# Patient Record
Sex: FEMALE | Race: BLACK OR AFRICAN AMERICAN | Employment: OTHER | URBAN - METROPOLITAN AREA
[De-identification: names, ages, dates, MRNs, and addresses within clinical notes are randomized per-mention and may not be internally consistent; named-entity substitution may affect disease eponyms.]

---

## 2017-01-11 RX ORDER — CLOPIDOGREL BISULFATE 75 MG/1
TABLET ORAL
Qty: 90 TABLET | Refills: 0 | Status: SHIPPED | OUTPATIENT
Start: 2017-01-11 | End: 2017-04-10

## 2017-01-17 ENCOUNTER — HOSPITAL ENCOUNTER (OUTPATIENT)
Dept: BONE DENSITY | Facility: HOSPITAL | Age: 67
Discharge: HOME | End: 2017-01-17
Attending: INTERNAL MEDICINE
Payer: MEDICARE

## 2017-01-17 DIAGNOSIS — Z91.81 AT HIGH RISK FOR FALLS: ICD-10-CM

## 2017-01-17 PROCEDURE — 77080 DXA BONE DENSITY AXIAL: CPT

## 2017-01-31 RX ORDER — LISINOPRIL AND HYDROCHLOROTHIAZIDE 25; 20 MG/1; MG/1
TABLET ORAL
Qty: 90 TABLET | Refills: 0 | Status: SHIPPED | OUTPATIENT
Start: 2017-01-31 | End: 2017-05-01

## 2017-03-15 RX ORDER — METOPROLOL SUCCINATE 25 MG/1
TABLET, EXTENDED RELEASE ORAL
Qty: 45 TABLET | Refills: 0 | Status: SHIPPED | OUTPATIENT
Start: 2017-03-15 | End: 2017-06-13

## 2017-04-11 RX ORDER — CLOPIDOGREL BISULFATE 75 MG/1
TABLET ORAL
Qty: 90 TABLET | Refills: 0 | Status: SHIPPED | OUTPATIENT
Start: 2017-04-11 | End: 2017-07-09

## 2017-05-01 RX ORDER — LISINOPRIL AND HYDROCHLOROTHIAZIDE 25; 20 MG/1; MG/1
TABLET ORAL
Qty: 90 TABLET | Refills: 0 | Status: SHIPPED | OUTPATIENT
Start: 2017-05-01 | End: 2018-10-22

## 2017-05-12 ENCOUNTER — OFFICE VISIT (OUTPATIENT)
Dept: INTERNAL MEDICINE CLINIC | Facility: CLINIC | Age: 67
End: 2017-05-12

## 2017-05-12 VITALS
HEIGHT: 63 IN | TEMPERATURE: 98 F | BODY MASS INDEX: 26.75 KG/M2 | OXYGEN SATURATION: 97 % | WEIGHT: 151 LBS | HEART RATE: 69 BPM | DIASTOLIC BLOOD PRESSURE: 82 MMHG | SYSTOLIC BLOOD PRESSURE: 130 MMHG

## 2017-05-12 DIAGNOSIS — I63.50 CEREBRAL ARTERY OCCLUSION WITH CEREBRAL INFARCTION (HCC): ICD-10-CM

## 2017-05-12 DIAGNOSIS — R94.31 ABNORMAL EKG: ICD-10-CM

## 2017-05-12 DIAGNOSIS — I69.398 ABNORMALITY OF GAIT AS LATE EFFECT OF CEREBROVASCULAR ACCIDENT (CVA): ICD-10-CM

## 2017-05-12 DIAGNOSIS — R26.9 ABNORMALITY OF GAIT AS LATE EFFECT OF CEREBROVASCULAR ACCIDENT (CVA): ICD-10-CM

## 2017-05-12 DIAGNOSIS — Z86.73 HISTORY OF CVA (CEREBROVASCULAR ACCIDENT): ICD-10-CM

## 2017-05-12 DIAGNOSIS — E11.9 DIABETES MELLITUS TYPE 2 WITHOUT RETINOPATHY (HCC): Primary | ICD-10-CM

## 2017-05-12 DIAGNOSIS — I10 ESSENTIAL HYPERTENSION: ICD-10-CM

## 2017-05-12 DIAGNOSIS — E78.5 HYPERLIPIDEMIA, UNSPECIFIED HYPERLIPIDEMIA TYPE: ICD-10-CM

## 2017-05-12 PROCEDURE — 93010 ELECTROCARDIOGRAM REPORT: CPT | Performed by: INTERNAL MEDICINE

## 2017-05-12 PROCEDURE — G0463 HOSPITAL OUTPT CLINIC VISIT: HCPCS | Performed by: INTERNAL MEDICINE

## 2017-05-12 PROCEDURE — 36415 COLL VENOUS BLD VENIPUNCTURE: CPT | Performed by: INTERNAL MEDICINE

## 2017-05-12 PROCEDURE — 99214 OFFICE O/P EST MOD 30 MIN: CPT | Performed by: INTERNAL MEDICINE

## 2017-05-12 PROCEDURE — 93005 ELECTROCARDIOGRAM TRACING: CPT | Performed by: INTERNAL MEDICINE

## 2017-05-12 NOTE — PROGRESS NOTES
HPI:    Patient ID: Caridad Fuentes is a 77year old female. HPI   Patient comes in for 6 months follow-up. Denies any complaints doing well overall taking her medications we did labs last time A1c was 6.8 all other labs were pretty good.   Up-to-date w Diabetes ([facility] [address]    • Unspecified essential hypertension    • Type II or unspecified type diabetes mellitus without mention of complication, not stated as uncontrolled    • Stroke [facility]) 1/2014   • Other and unspecified hyperlipidemia    • Numbness and tingli Hyperlipidemia, unspecified hyperlipidemia type- continue with current care   Abnormality of gait as late effect of cerebrovascular accident (cva)- stable, no falls  Abnormal EKG- no cp, no sob, will get an echo t see function       Orders Placed This En

## 2017-05-12 NOTE — PATIENT INSTRUCTIONS
ASSESSMENT/PLAN:   Diabetes mellitus type 2 without retinopathy (hcc)  (primary encounter diagnosis)- will continue with current care , will recheck a1c  Cerebral artery occlusion with cerebral infarction (hcc)- stable, continue with medical treatment   Es

## 2017-05-20 RX ORDER — ATORVASTATIN CALCIUM 20 MG/1
TABLET, FILM COATED ORAL
Qty: 90 TABLET | Refills: 0 | Status: SHIPPED | OUTPATIENT
Start: 2017-05-20 | End: 2017-08-22

## 2017-06-05 ENCOUNTER — HOSPITAL ENCOUNTER (OUTPATIENT)
Dept: CV DIAGNOSTICS | Facility: HOSPITAL | Age: 67
Discharge: HOME | End: 2017-06-05
Attending: INTERNAL MEDICINE
Payer: MEDICARE

## 2017-06-05 DIAGNOSIS — I10 ESSENTIAL HYPERTENSION: ICD-10-CM

## 2017-06-05 DIAGNOSIS — I63.50 CEREBRAL ARTERY OCCLUSION WITH CEREBRAL INFARCTION (HCC): ICD-10-CM

## 2017-06-05 DIAGNOSIS — E11.9 DIABETES MELLITUS TYPE 2 WITHOUT RETINOPATHY (HCC): ICD-10-CM

## 2017-06-05 DIAGNOSIS — R94.31 ABNORMAL EKG: ICD-10-CM

## 2017-06-05 PROCEDURE — 93306 TTE W/DOPPLER COMPLETE: CPT | Performed by: INTERNAL MEDICINE

## 2017-06-06 ENCOUNTER — TELEPHONE (OUTPATIENT)
Dept: INTERNAL MEDICINE CLINIC | Facility: CLINIC | Age: 67
End: 2017-06-06

## 2017-06-14 RX ORDER — METOPROLOL SUCCINATE 25 MG/1
TABLET, EXTENDED RELEASE ORAL
Qty: 45 TABLET | Refills: 1 | Status: SHIPPED | OUTPATIENT
Start: 2017-06-14 | End: 2017-12-07

## 2017-07-10 RX ORDER — CLOPIDOGREL BISULFATE 75 MG/1
TABLET ORAL
Qty: 90 TABLET | Refills: 0 | Status: SHIPPED | OUTPATIENT
Start: 2017-07-10 | End: 2017-10-05

## 2017-08-03 ENCOUNTER — TELEPHONE (OUTPATIENT)
Dept: INTERNAL MEDICINE CLINIC | Facility: CLINIC | Age: 67
End: 2017-08-03

## 2017-08-03 DIAGNOSIS — E11.8 TYPE 2 DIABETES MELLITUS WITH COMPLICATION, WITHOUT LONG-TERM CURRENT USE OF INSULIN (HCC): Primary | ICD-10-CM

## 2017-08-03 DIAGNOSIS — Z12.31 ENCOUNTER FOR SCREENING MAMMOGRAM FOR BREAST CANCER: Primary | ICD-10-CM

## 2017-08-03 NOTE — TELEPHONE ENCOUNTER
Patient requesting referral for Dr Dony Villegas for yearly eye exam.    Patient also requesting referral for Podiatrist for yearly DM.      Please call when referrals are authorized

## 2017-08-11 ENCOUNTER — OFFICE VISIT (OUTPATIENT)
Dept: INTERNAL MEDICINE CLINIC | Facility: CLINIC | Age: 67
End: 2017-08-11

## 2017-08-11 VITALS
DIASTOLIC BLOOD PRESSURE: 67 MMHG | SYSTOLIC BLOOD PRESSURE: 117 MMHG | HEIGHT: 63 IN | TEMPERATURE: 99 F | WEIGHT: 153 LBS | BODY MASS INDEX: 27.11 KG/M2 | HEART RATE: 77 BPM

## 2017-08-11 DIAGNOSIS — M54.6 ACUTE RIGHT-SIDED THORACIC BACK PAIN: ICD-10-CM

## 2017-08-11 DIAGNOSIS — R10.9 ABDOMINAL DISCOMFORT: ICD-10-CM

## 2017-08-11 DIAGNOSIS — R35.0 FREQUENCY OF URINATION: Primary | ICD-10-CM

## 2017-08-11 LAB
APPEARANCE: CLEAR
MULTISTIX LOT#: NORMAL NUMERIC
PH, URINE: 5.5 (ref 4.5–8)
SPECIFIC GRAVITY: 1.02 (ref 1–1.03)
URINE-COLOR: YELLOW
UROBILINOGEN,SEMI-QN: 1 MG/DL (ref 0–1.9)

## 2017-08-11 PROCEDURE — 99214 OFFICE O/P EST MOD 30 MIN: CPT | Performed by: INTERNAL MEDICINE

## 2017-08-11 PROCEDURE — G0463 HOSPITAL OUTPT CLINIC VISIT: HCPCS | Performed by: INTERNAL MEDICINE

## 2017-08-11 PROCEDURE — 81003 URINALYSIS AUTO W/O SCOPE: CPT | Performed by: INTERNAL MEDICINE

## 2017-08-18 ENCOUNTER — OUTPATIENT (OUTPATIENT)
Dept: OUTPATIENT SERVICES | Facility: HOSPITAL | Age: 67
LOS: 1 days | End: 2017-08-18
Payer: MEDICARE

## 2017-08-18 PROCEDURE — 76536 US EXAM OF HEAD AND NECK: CPT | Mod: 26

## 2017-08-22 RX ORDER — ATORVASTATIN CALCIUM 20 MG/1
TABLET, FILM COATED ORAL
Qty: 90 TABLET | Refills: 0 | Status: SHIPPED | OUTPATIENT
Start: 2017-08-22 | End: 2017-11-19

## 2017-09-12 ENCOUNTER — OFFICE VISIT (OUTPATIENT)
Dept: PODIATRY CLINIC | Facility: CLINIC | Age: 67
End: 2017-09-12

## 2017-09-12 DIAGNOSIS — E11.42 TYPE 2 DIABETES MELLITUS WITH DIABETIC POLYNEUROPATHY, WITHOUT LONG-TERM CURRENT USE OF INSULIN (HCC): Primary | ICD-10-CM

## 2017-09-12 PROCEDURE — G0463 HOSPITAL OUTPT CLINIC VISIT: HCPCS | Performed by: PODIATRIST

## 2017-09-12 PROCEDURE — 99203 OFFICE O/P NEW LOW 30 MIN: CPT | Performed by: PODIATRIST

## 2017-09-12 NOTE — PROGRESS NOTES
HPI:    Patient ID: Jovanna Clarke is a 79year old female. HPI  This pleasant 80-year-old female presents as a new patient to me on referral from [address] [place] [address].   Patient states that she is here for a diabetic foot evaluation and concerned about a sense of reviewed the etiology, progression, and the for treatment options for fungus toenails. My impression today is debridement is her best and only option. I discussed the importance of daily inspection, proper hygiene, and the daily use of a lotion.   I also

## 2017-09-16 ENCOUNTER — EMERGENCY (EMERGENCY)
Facility: HOSPITAL | Age: 67
LOS: 1 days | End: 2017-09-16
Payer: MEDICARE

## 2017-09-16 ENCOUNTER — HOSPITAL ENCOUNTER (OUTPATIENT)
Dept: MAMMOGRAPHY | Facility: HOSPITAL | Age: 67
Discharge: HOME | End: 2017-09-16
Attending: INTERNAL MEDICINE
Payer: MEDICARE

## 2017-09-16 DIAGNOSIS — Z12.31 ENCOUNTER FOR SCREENING MAMMOGRAM FOR BREAST CANCER: ICD-10-CM

## 2017-09-16 PROCEDURE — 99283 EMERGENCY DEPT VISIT LOW MDM: CPT

## 2017-09-16 PROCEDURE — 77067 SCR MAMMO BI INCL CAD: CPT | Performed by: INTERNAL MEDICINE

## 2017-09-16 PROCEDURE — 73630 X-RAY EXAM OF FOOT: CPT | Mod: 26,LT

## 2017-09-25 ENCOUNTER — TELEPHONE (OUTPATIENT)
Dept: INTERNAL MEDICINE CLINIC | Facility: CLINIC | Age: 67
End: 2017-09-25

## 2017-09-25 NOTE — TELEPHONE ENCOUNTER
Patient needs an order faxed to pharmacy for diabetic  Supplies she needs strips for one touch ultra 2 phone # [phone] or fax to  [phone]

## 2017-09-25 NOTE — TELEPHONE ENCOUNTER
bety already informed, one touch ultra is no longer covered by her insurance, I called in rx for eithee accu check or the true test which is covered by her insurance. Pt was informed of this.

## 2017-10-05 RX ORDER — CLOPIDOGREL BISULFATE 75 MG/1
TABLET ORAL
Qty: 90 TABLET | Refills: 0 | Status: SHIPPED | OUTPATIENT
Start: 2017-10-05 | End: 2018-01-03

## 2017-10-05 NOTE — TELEPHONE ENCOUNTER
No refill protocol for clopidogrel.   YANDEL please advise on refill request.    Refill Protocol Appointment Criteria  · Appointment scheduled in the past 6 months or in the next 3 months  Recent Outpatient Visits            3 weeks ago Type 2 diabetes mellitu

## 2017-10-10 ENCOUNTER — TELEPHONE (OUTPATIENT)
Dept: INTERNAL MEDICINE CLINIC | Facility: CLINIC | Age: 67
End: 2017-10-10

## 2017-10-10 NOTE — TELEPHONE ENCOUNTER
Contacted patient to schedule her AHA (medicare annual exam) and patient stated that she has a \"Follow up\" appt. With you on 11/17 and requested if she can change that appt to her Medicare annual exam. Please advise. Thank you.

## 2017-10-10 NOTE — TELEPHONE ENCOUNTER
Yours noted and yes, her last AHA (Medicare annual exam) was 11/11/16. I will contact patient. Thank you.

## 2017-10-19 NOTE — TELEPHONE ENCOUNTER
Rx request for Metformin  mg, FAILED Diabetes Protocol. UNABLE to fill per protocol. Please review. Thank you.     Diabetes Medications  Protocol Criteria:  · Appointment scheduled in the past 6 months or the next 3 months  · A1C < 7.5 in the past 6

## 2017-11-08 ENCOUNTER — TELEPHONE (OUTPATIENT)
Dept: INTERNAL MEDICINE CLINIC | Facility: CLINIC | Age: 67
End: 2017-11-08

## 2017-11-08 DIAGNOSIS — E11.9 TYPE 2 DIABETES MELLITUS WITHOUT COMPLICATION, WITHOUT LONG-TERM CURRENT USE OF INSULIN (HCC): Primary | ICD-10-CM

## 2017-11-13 ENCOUNTER — OFFICE VISIT (OUTPATIENT)
Dept: PODIATRY CLINIC | Facility: CLINIC | Age: 67
End: 2017-11-13

## 2017-11-13 DIAGNOSIS — E11.42 TYPE 2 DIABETES MELLITUS WITH DIABETIC POLYNEUROPATHY, WITHOUT LONG-TERM CURRENT USE OF INSULIN (HCC): Primary | ICD-10-CM

## 2017-11-13 PROCEDURE — 99212 OFFICE O/P EST SF 10 MIN: CPT | Performed by: PODIATRIST

## 2017-11-13 PROCEDURE — G0463 HOSPITAL OUTPT CLINIC VISIT: HCPCS | Performed by: PODIATRIST

## 2017-11-13 RX ORDER — BLOOD-GLUCOSE METER
EACH MISCELLANEOUS
Refills: 3 | COMMUNITY
Start: 2017-09-25 | End: 2019-04-09

## 2017-11-13 NOTE — PROGRESS NOTES
HPI:    Patient ID: Yasmin Calderon is a 79year old female. HPI  This 49-year-old diabetic presents for evaluation in reference to the treatment associated with sensory neuropathy.   She states that her fasting blood sugar today was 131 and her most rec open or draining. Based on the room rather dramatic change I encouraged continued control of her blood sugar level, maintain the vitamin B6 at 100 mg per day. Continue daily inspection, proper hygiene, and the daily use of a lotion.   Reevaluate in 6 jose daniel

## 2017-11-17 ENCOUNTER — OFFICE VISIT (OUTPATIENT)
Dept: INTERNAL MEDICINE CLINIC | Facility: CLINIC | Age: 67
End: 2017-11-17

## 2017-11-17 VITALS
WEIGHT: 147 LBS | BODY MASS INDEX: 26.05 KG/M2 | DIASTOLIC BLOOD PRESSURE: 80 MMHG | HEART RATE: 77 BPM | HEIGHT: 63 IN | SYSTOLIC BLOOD PRESSURE: 135 MMHG

## 2017-11-17 DIAGNOSIS — E11.9 TYPE 2 DIABETES MELLITUS WITHOUT COMPLICATION, WITHOUT LONG-TERM CURRENT USE OF INSULIN (HCC): ICD-10-CM

## 2017-11-17 DIAGNOSIS — I10 ESSENTIAL HYPERTENSION: ICD-10-CM

## 2017-11-17 DIAGNOSIS — M25.561 CHRONIC PAIN OF RIGHT KNEE: ICD-10-CM

## 2017-11-17 DIAGNOSIS — R47.1 DYSARTHRIA: ICD-10-CM

## 2017-11-17 DIAGNOSIS — R26.9 ABNORMALITY OF GAIT AS LATE EFFECT OF CEREBROVASCULAR ACCIDENT (CVA): ICD-10-CM

## 2017-11-17 DIAGNOSIS — Z86.79 HISTORY OF CEREBRAL ARTERY OCCLUSION: ICD-10-CM

## 2017-11-17 DIAGNOSIS — E78.5 HYPERLIPIDEMIA, UNSPECIFIED HYPERLIPIDEMIA TYPE: ICD-10-CM

## 2017-11-17 DIAGNOSIS — Z00.00 MEDICARE ANNUAL WELLNESS VISIT, SUBSEQUENT: Primary | ICD-10-CM

## 2017-11-17 DIAGNOSIS — H25.13 AGE-RELATED NUCLEAR CATARACT OF BOTH EYES: ICD-10-CM

## 2017-11-17 DIAGNOSIS — G89.29 CHRONIC PAIN OF RIGHT KNEE: ICD-10-CM

## 2017-11-17 DIAGNOSIS — I69.398 ABNORMALITY OF GAIT AS LATE EFFECT OF CEREBROVASCULAR ACCIDENT (CVA): ICD-10-CM

## 2017-11-17 PROCEDURE — 36415 COLL VENOUS BLD VENIPUNCTURE: CPT | Performed by: INTERNAL MEDICINE

## 2017-11-17 PROCEDURE — 96160 PT-FOCUSED HLTH RISK ASSMT: CPT | Performed by: INTERNAL MEDICINE

## 2017-11-17 PROCEDURE — G0009 ADMIN PNEUMOCOCCAL VACCINE: HCPCS | Performed by: INTERNAL MEDICINE

## 2017-11-17 PROCEDURE — 90653 IIV ADJUVANT VACCINE IM: CPT | Performed by: INTERNAL MEDICINE

## 2017-11-17 PROCEDURE — G0008 ADMIN INFLUENZA VIRUS VAC: HCPCS | Performed by: INTERNAL MEDICINE

## 2017-11-17 PROCEDURE — 90732 PPSV23 VACC 2 YRS+ SUBQ/IM: CPT | Performed by: INTERNAL MEDICINE

## 2017-11-17 PROCEDURE — G0439 PPPS, SUBSEQ VISIT: HCPCS | Performed by: INTERNAL MEDICINE

## 2017-11-17 NOTE — PROGRESS NOTES
HPI:   Anna Marie Belle is a 79year old female who presents for a Medicare Subsequent Annual Wellness visit (Pt already had Initial Annual Wellness).       Her last annual assessment has been over 1 year: Annual Physical due on 11/11/2017       Pt here for [DISCONTINUED] ATORVASTATIN 20 MG Oral Tab TAKE 1 TABLET BY MOUTH DAILY   METOPROLOL SUCCINATE ER 25 MG Oral Tablet 24 Hr TAKE 1/2 TABLET BY MOUTH DAILY   LISINOPRIL-HYDROCHLOROTHIAZIDE 20-25 MG Oral Tab TAKE 1 TABLET BY MOUTH EVERY DAY   Ana Lilia Adkins Height as of this encounter: 5' 3\" (1.6 m). Weight as of this encounter: 147 lb (66.7 kg).     Medicare Hearing Assessment  (Required for AWV/SWV)    Hearing Screening    Screening Method:  Questionnaire  I have a problem hearing over the telephone:  No of motion. Neck supple. Cardiovascular: Normal rate, regular rhythm, normal heart sounds and intact distal pulses. Pulmonary/Chest: Effort normal and breath sounds normal.   Abdominal: Soft.  Bowel sounds are normal.   Musculoskeletal: Normal range of hyperlipidemia type- will retest     Abnormality of gait as late effect of cerebrovascular accident (CVA)- stable,pt refuses to use a cane or walker    Ms. Iveth Puga already takes aspirin and has it on her medication list.     Diet assessment: good     Advanced activities? : No    Memory Problems?: No      Fall/Risk Assessment     Do you have 3 or more medical conditions?: 1-Yes    Have you fallen in the last 12 months?: 1-Yes (pt tripped)    Do you accidently lose urine?: 0-No    Do you have difficulty seeing?: 03/18/2016 47        EKG - w/ Initial Preventative Physical Exam only, or if medically necessary Electrocardiogram date05/12/2017     Colorectal Cancer Screening      Colonoscopy Screen every 10 years Colonoscopy,10 Years due on 06/05/2000 Update Health Internal Lab or Procedure External Lab or Procedure   Annual Monitoring of Persistent     Medications (ACE/ARB, digoxin diuretics, anticonvulsants.)    Potassium  Annually Potassium (mmol/L)   Date Value   11/17/2017 3.4     POTASSIUM (P) (mmol/L)   Date V

## 2017-11-20 RX ORDER — ATORVASTATIN CALCIUM 20 MG/1
TABLET, FILM COATED ORAL
Qty: 90 TABLET | Refills: 0 | Status: SHIPPED | OUTPATIENT
Start: 2017-11-20 | End: 2018-02-15

## 2017-11-23 PROBLEM — Z86.79 HISTORY OF CEREBRAL ARTERY OCCLUSION: Status: ACTIVE | Noted: 2017-11-23

## 2017-12-07 RX ORDER — METOPROLOL SUCCINATE 25 MG/1
TABLET, EXTENDED RELEASE ORAL
Qty: 45 TABLET | Refills: 0 | Status: SHIPPED | OUTPATIENT
Start: 2017-12-07 | End: 2018-03-08

## 2017-12-19 ENCOUNTER — OFFICE VISIT (OUTPATIENT)
Dept: OPHTHALMOLOGY | Facility: CLINIC | Age: 67
End: 2017-12-19

## 2017-12-19 DIAGNOSIS — E11.9 DIABETES MELLITUS TYPE 2 WITHOUT RETINOPATHY (HCC): Primary | ICD-10-CM

## 2017-12-19 DIAGNOSIS — H25.13 AGE-RELATED NUCLEAR CATARACT OF BOTH EYES: ICD-10-CM

## 2017-12-19 PROCEDURE — 92014 COMPRE OPH EXAM EST PT 1/>: CPT | Performed by: OPHTHALMOLOGY

## 2017-12-19 NOTE — PATIENT INSTRUCTIONS
Diabetes mellitus type 2 without retinopathy ([facility] [address]  Diabetes type II: no background of retinopathy, no signs of neovascularization noted. Discussed ocular and systemic benefits of blood sugar control.   Diagnosis and treatment discussed in detail with félix

## 2017-12-19 NOTE — PROGRESS NOTES
Milly Berkowitz is a 79year old female.     HPI:     HPI     Consult    Additional comments: Per Dr. Trini Woodruff           Diabetic Eye Exam    Additional comments: Pt has been a diabetic for 3 years  3 years on pills/  0 years on Insulin   Pt checks her BS 2x a Oral Tab TAKE 1 TABLET BY MOUTH TWICE DAILY WITH BREAKFAST AND DINNER Disp: 180 tablet Rfl: 0   CLOPIDOGREL BISULFATE 75 MG Oral Tab TAKE 1 TABLET BY MOUTH DAILY Disp: 90 tablet Rfl: 0   ONETOUCH ULTRA BLUE In Vitro Strip TEST TWICE DAILY AS DIRECTED Disp: Cortical cataract 2-3+ Nuclear sclerosis    Vitreous Clear Vitreous floaters          Fundus Exam       Right Left    Disc Good rim Good rim, Temporal crescent    C/D Ratio 0.5 0.5    Macula Normal- no BDR Normal- no BDR    Vessels Normal Normal    Periphe

## 2018-01-03 RX ORDER — CLOPIDOGREL BISULFATE 75 MG/1
TABLET ORAL
Qty: 90 TABLET | Refills: 0 | Status: SHIPPED | OUTPATIENT
Start: 2018-01-03 | End: 2018-04-02

## 2018-02-15 RX ORDER — ATORVASTATIN CALCIUM 20 MG/1
TABLET, FILM COATED ORAL
Qty: 90 TABLET | Refills: 0 | Status: SHIPPED | OUTPATIENT
Start: 2018-02-15 | End: 2018-05-16

## 2018-03-08 RX ORDER — METOPROLOL SUCCINATE 25 MG/1
TABLET, EXTENDED RELEASE ORAL
Qty: 45 TABLET | Refills: 0 | Status: SHIPPED | OUTPATIENT
Start: 2018-03-08 | End: 2018-06-08

## 2018-03-27 ENCOUNTER — OUTPATIENT (OUTPATIENT)
Dept: OUTPATIENT SERVICES | Facility: HOSPITAL | Age: 68
LOS: 1 days | End: 2018-03-27

## 2018-04-02 RX ORDER — CLOPIDOGREL BISULFATE 75 MG/1
TABLET ORAL
Qty: 90 TABLET | Refills: 0 | Status: SHIPPED | OUTPATIENT
Start: 2018-04-02 | End: 2018-07-03

## 2018-04-20 ENCOUNTER — OFFICE VISIT (OUTPATIENT)
Dept: INTERNAL MEDICINE CLINIC | Facility: CLINIC | Age: 68
End: 2018-04-20

## 2018-04-20 VITALS
HEIGHT: 63 IN | BODY MASS INDEX: 27.46 KG/M2 | SYSTOLIC BLOOD PRESSURE: 137 MMHG | DIASTOLIC BLOOD PRESSURE: 88 MMHG | HEART RATE: 73 BPM | WEIGHT: 155 LBS

## 2018-04-20 DIAGNOSIS — G89.29 CHRONIC PAIN OF RIGHT KNEE: ICD-10-CM

## 2018-04-20 DIAGNOSIS — E11.42 TYPE 2 DIABETES MELLITUS WITH DIABETIC POLYNEUROPATHY, WITHOUT LONG-TERM CURRENT USE OF INSULIN (HCC): ICD-10-CM

## 2018-04-20 DIAGNOSIS — I65.23 CAROTID ARTERY PLAQUE, BILATERAL: ICD-10-CM

## 2018-04-20 DIAGNOSIS — I10 ESSENTIAL HYPERTENSION: ICD-10-CM

## 2018-04-20 DIAGNOSIS — I63.50 CEREBRAL ARTERY OCCLUSION WITH CEREBRAL INFARCTION (HCC): ICD-10-CM

## 2018-04-20 DIAGNOSIS — I50.32 CHRONIC DIASTOLIC CONGESTIVE HEART FAILURE, NYHA CLASS 1 (HCC): ICD-10-CM

## 2018-04-20 DIAGNOSIS — R47.1 DYSARTHRIA: ICD-10-CM

## 2018-04-20 DIAGNOSIS — M25.561 CHRONIC PAIN OF RIGHT KNEE: ICD-10-CM

## 2018-04-20 DIAGNOSIS — H25.13 AGE-RELATED NUCLEAR CATARACT OF BOTH EYES: ICD-10-CM

## 2018-04-20 DIAGNOSIS — I69.398 ABNORMALITY OF GAIT AS LATE EFFECT OF CEREBROVASCULAR ACCIDENT (CVA): ICD-10-CM

## 2018-04-20 DIAGNOSIS — Z00.00 MEDICARE ANNUAL WELLNESS VISIT, SUBSEQUENT: ICD-10-CM

## 2018-04-20 DIAGNOSIS — E78.5 HYPERLIPIDEMIA, UNSPECIFIED HYPERLIPIDEMIA TYPE: ICD-10-CM

## 2018-04-20 DIAGNOSIS — R26.9 ABNORMALITY OF GAIT AS LATE EFFECT OF CEREBROVASCULAR ACCIDENT (CVA): ICD-10-CM

## 2018-04-20 PROCEDURE — G0439 PPPS, SUBSEQ VISIT: HCPCS | Performed by: INTERNAL MEDICINE

## 2018-04-20 PROCEDURE — 96160 PT-FOCUSED HLTH RISK ASSMT: CPT | Performed by: INTERNAL MEDICINE

## 2018-04-20 NOTE — PROGRESS NOTES
HPI:   Dario Leung is a 79year old female who presents for a MA (Medicare Advantage) [facility] (Once per calendar year). Annual Physical due on 11/17/2018    pt comes in for Medicare annual visit.   Overall she is doing okay same right-sided weak Maryellen Sykes was screened for Alcohol abuse and had a score of 0 so is at low risk.     Patient Care Team: Patient Care Team:  Padmini Canela MD as PCP - General (Internal Medicine)    Patient Active Problem List:     Cerebral artery occlusion with ce CALCIUM OR Take by mouth. Cholecalciferol (VITAMIN D-3 OR) Take by mouth.    LISINOPRIL-HYDROCHLOROTHIAZIDE 20-25 MG Oral Tab TAKE 1 TABLET BY MOUTH EVERY DAY   ONETOUCH CLINT COOPER 08X Does not apply Misc use as directed twice daily   ONETOUCH ULTRA (70.3 kg).     Medicare Hearing Assessment  (Required for AWV/SWV)    Hearing Screening    Screening Method:  Questionnaire  I have a problem hearing over the telephone:  No I have trouble following the conversations when two or more people are talking at t distal pulses. Pulmonary/Chest: Effort normal and breath sounds normal.   Abdominal: Soft. Bowel sounds are normal.   Musculoskeletal: Normal range of motion. Neurological: She is alert and oriented to person, place, and time.  A cranial nerve deficit TAKE 1 TABLET BY MOUTH TWICE DAILY WITH BREAKFAST AND DINNER         Diet assessment: good     PLAN:  The patient indicates understanding of these issues and agrees to the plan. Reinforced healthy diet, lifestyle, and exercise. No Follow-up on file. 12/19/2017   Last Dilated Eye Exam 12/19/2017       Bone Density Screening      Dexascan Every two years Last Dexa Scan:   XR DEXA BONE DENSITOMETRY (CPT=77080) 01/17/2017    No flowsheet data found.     Pap and Pelvic      Pap: Every 3 yrs age 21-65 or Pap 3. 4     POTASSIUM (P) (mmol/L)   Date Value   03/13/2015 3.8    No flowsheet data found. Creatinine  Annually Creatinine (mg/dL)   Date Value   11/17/2017 0.96     CREATININE (P) (mg/dL)   Date Value   03/13/2015 0.75    No flowsheet data found.     Drug

## 2018-05-15 ENCOUNTER — OFFICE VISIT (OUTPATIENT)
Dept: PODIATRY CLINIC | Facility: CLINIC | Age: 68
End: 2018-05-15

## 2018-05-15 DIAGNOSIS — B35.1 ONYCHOMYCOSIS: ICD-10-CM

## 2018-05-15 DIAGNOSIS — E11.42 TYPE 2 DIABETES MELLITUS WITH DIABETIC POLYNEUROPATHY, WITHOUT LONG-TERM CURRENT USE OF INSULIN (HCC): Primary | ICD-10-CM

## 2018-05-15 PROCEDURE — 11721 DEBRIDE NAIL 6 OR MORE: CPT | Performed by: PODIATRIST

## 2018-05-15 NOTE — PROGRESS NOTES
HPI:    Patient ID: Luz Elena Luis is a 79year old female. HPI  This 70-year-old diabetic presents for evaluation and care. She saw Sheila Jo on April 20, 2018. Her most recent A1c was 6.6 and her fasting blood sugar today was 117.   Review of Systems toenails was accomplished today without incident. I reduced the nails, subungual debris, and some keratosis. No ulcerations or infections were encountered upon debridement.   I cautioned her in reference to daily inspection, proper hygiene, and a consiste

## 2018-05-16 RX ORDER — ATORVASTATIN CALCIUM 20 MG/1
TABLET, FILM COATED ORAL
Qty: 90 TABLET | Refills: 0 | Status: SHIPPED | OUTPATIENT
Start: 2018-05-16 | End: 2018-08-15

## 2018-06-08 RX ORDER — METOPROLOL SUCCINATE 25 MG/1
TABLET, EXTENDED RELEASE ORAL
Qty: 45 TABLET | Refills: 1 | Status: SHIPPED | OUTPATIENT
Start: 2018-06-08 | End: 2019-04-08

## 2018-07-05 RX ORDER — CLOPIDOGREL BISULFATE 75 MG/1
TABLET ORAL
Qty: 90 TABLET | Refills: 0 | Status: SHIPPED | OUTPATIENT
Start: 2018-07-05 | End: 2018-10-03

## 2018-08-07 ENCOUNTER — OFFICE VISIT (OUTPATIENT)
Dept: INTERNAL MEDICINE CLINIC | Facility: CLINIC | Age: 68
End: 2018-08-07
Payer: MEDICARE

## 2018-08-07 VITALS
BODY MASS INDEX: 27.11 KG/M2 | HEIGHT: 63 IN | WEIGHT: 153 LBS | SYSTOLIC BLOOD PRESSURE: 131 MMHG | HEART RATE: 69 BPM | DIASTOLIC BLOOD PRESSURE: 87 MMHG

## 2018-08-07 DIAGNOSIS — I10 ESSENTIAL HYPERTENSION: ICD-10-CM

## 2018-08-07 DIAGNOSIS — E78.5 HYPERLIPIDEMIA, UNSPECIFIED HYPERLIPIDEMIA TYPE: ICD-10-CM

## 2018-08-07 DIAGNOSIS — I69.398 ABNORMALITY OF GAIT AS LATE EFFECT OF CEREBROVASCULAR ACCIDENT (CVA): ICD-10-CM

## 2018-08-07 DIAGNOSIS — R26.9 ABNORMALITY OF GAIT AS LATE EFFECT OF CEREBROVASCULAR ACCIDENT (CVA): ICD-10-CM

## 2018-08-07 DIAGNOSIS — E11.42 TYPE 2 DIABETES MELLITUS WITH DIABETIC POLYNEUROPATHY, WITHOUT LONG-TERM CURRENT USE OF INSULIN (HCC): Primary | ICD-10-CM

## 2018-08-07 DIAGNOSIS — I50.32 CHRONIC DIASTOLIC CONGESTIVE HEART FAILURE, NYHA CLASS 1 (HCC): ICD-10-CM

## 2018-08-07 DIAGNOSIS — R47.1 DYSARTHRIA: ICD-10-CM

## 2018-08-07 LAB
CHOLEST SERPL-MCNC: 109 MG/DL (ref 110–200)
HDLC SERPL-MCNC: 59 MG/DL
LDLC SERPL CALC-MCNC: 38 MG/DL (ref 0–99)
NONHDLC SERPL-MCNC: 50 MG/DL
TRIGL SERPL-MCNC: 59 MG/DL (ref 1–149)

## 2018-08-07 PROCEDURE — 99214 OFFICE O/P EST MOD 30 MIN: CPT | Performed by: INTERNAL MEDICINE

## 2018-08-07 PROCEDURE — 36415 COLL VENOUS BLD VENIPUNCTURE: CPT | Performed by: INTERNAL MEDICINE

## 2018-08-07 NOTE — PATIENT INSTRUCTIONS
ASSESSMENT/PLAN:   Type 2 diabetes mellitus with diabetic polyneuropathy, without long-term current use of insulin (hcc)  (primary encounter diagnosis) we will retest continue with current treatment watch diet  Essential hypertension treated with current t

## 2018-08-07 NOTE — PROGRESS NOTES
HPI:    Patient ID: Armida Ledezma is a 76year old female. HPI  Patient comes in for six-month follow-up doing well denies any complaints taking medication watches diet. Review of Systems   Constitutional: Negative. HENT: Negative.     Eyes: Jacqueline Chano unspecified hyperlipidemia    • Stroke [facility]) 1/2014   • Type II or unspecified type diabetes mellitus without mention of complication, not stated as uncontrolled    • Unspecified essential hypertension       Past Surgical History:  No date: CYST ASPIRATION accident (cva) due to prior CVA watch step no falls  Dysarthria stable      Orders Placed This Encounter      Lipid Panel [E], specimen      Hemoglobin A1C [E]    Meds This Visit:  No prescriptions requested or ordered in this encounter    Imaging & Referr

## 2018-08-08 LAB — HBA1C MFR BLD: 6.7 % (ref 4–6)

## 2018-08-15 RX ORDER — ATORVASTATIN CALCIUM 20 MG/1
TABLET, FILM COATED ORAL
Qty: 90 TABLET | Refills: 0 | Status: SHIPPED | OUTPATIENT
Start: 2018-08-15 | End: 2018-11-13

## 2018-09-17 ENCOUNTER — HOSPITAL ENCOUNTER (OUTPATIENT)
Dept: MAMMOGRAPHY | Facility: HOSPITAL | Age: 68
Discharge: HOME | End: 2018-09-17
Attending: INTERNAL MEDICINE
Payer: MEDICARE

## 2018-09-17 ENCOUNTER — TELEPHONE (OUTPATIENT)
Dept: INTERNAL MEDICINE CLINIC | Facility: CLINIC | Age: 68
End: 2018-09-17

## 2018-09-17 DIAGNOSIS — Z92.89 HISTORY OF SCREENING MAMMOGRAPHY: Primary | ICD-10-CM

## 2018-09-17 DIAGNOSIS — Z12.31 SCREENING MAMMOGRAM, ENCOUNTER FOR: ICD-10-CM

## 2018-09-17 PROCEDURE — 77067 SCR MAMMO BI INCL CAD: CPT | Performed by: INTERNAL MEDICINE

## 2018-10-03 RX ORDER — CLOPIDOGREL BISULFATE 75 MG/1
TABLET ORAL
Qty: 90 TABLET | Refills: 0 | Status: SHIPPED | OUTPATIENT
Start: 2018-10-03 | End: 2019-11-19

## 2018-10-22 RX ORDER — LISINOPRIL AND HYDROCHLOROTHIAZIDE 25; 20 MG/1; MG/1
TABLET ORAL
Qty: 90 TABLET | Refills: 0 | Status: SHIPPED | OUTPATIENT
Start: 2018-10-22 | End: 2019-02-20

## 2018-10-31 ENCOUNTER — OFFICE VISIT (OUTPATIENT)
Dept: INTERNAL MEDICINE CLINIC | Facility: CLINIC | Age: 68
End: 2018-10-31
Payer: MEDICARE

## 2018-10-31 VITALS
RESPIRATION RATE: 18 BRPM | HEART RATE: 74 BPM | SYSTOLIC BLOOD PRESSURE: 135 MMHG | WEIGHT: 153 LBS | HEIGHT: 63 IN | DIASTOLIC BLOOD PRESSURE: 85 MMHG | BODY MASS INDEX: 27.11 KG/M2 | TEMPERATURE: 99 F

## 2018-10-31 DIAGNOSIS — N20.0 KIDNEY STONE: ICD-10-CM

## 2018-10-31 DIAGNOSIS — R10.9 LEFT SIDED ABDOMINAL PAIN: Primary | ICD-10-CM

## 2018-10-31 PROCEDURE — 99213 OFFICE O/P EST LOW 20 MIN: CPT | Performed by: INTERNAL MEDICINE

## 2018-10-31 RX ORDER — ZOSTER VACCINE RECOMBINANT, ADJUVANTED 50 MCG/0.5
KIT INTRAMUSCULAR
COMMUNITY
Start: 2018-10-12

## 2018-10-31 NOTE — PATIENT INSTRUCTIONS
Left sided colicy pain- ua  - moderate blood , could be kidney stone , get xr abdomen, drink more fluids , strain the urine - if pain is worse go to er .  We need to reepeat ua in one week

## 2018-10-31 NOTE — PROGRESS NOTES
HPI:    Patient ID: Jovanna Clarke is a 76year old female. HPI    She came in today complaining of left-sided abdominal pain.   According to her she was doing okay yesterday she went to sleep in the middle of the night she was having some pain on her l bruise/bleed easily. Psychiatric/Behavioral: Negative for agitation, behavioral problems, confusion, hallucinations and sleep disturbance. The patient is not nervous/anxious.             Current Outpatient Medications:  LISINOPRIL-HYDROCHLOROTHIAZIDE 20-2 NEEDLE LOCALIZATION W/ SPECIMEN 1 SITE RIGHT      pt states 20 yrs+ she had a benign cyst removed from her rt breast.       Family History   Problem Relation Age of Onset   • Heart Disorder Mother         congestive heart failure   • Diabetes Other distress. She has no wheezes. She has no rales. She exhibits no tenderness. Abdominal: Soft. Bowel sounds are normal. She exhibits no shifting dullness, no distension and no mass. There is no hepatosplenomegaly. There is no tenderness.  There is no rigidi

## 2018-11-01 ENCOUNTER — HOSPITAL ENCOUNTER (OUTPATIENT)
Dept: GENERAL RADIOLOGY | Facility: HOSPITAL | Age: 68
Discharge: HOME | End: 2018-11-01
Attending: INTERNAL MEDICINE
Payer: MEDICARE

## 2018-11-01 DIAGNOSIS — N20.0 KIDNEY STONE: ICD-10-CM

## 2018-11-01 PROCEDURE — 74018 RADEX ABDOMEN 1 VIEW: CPT | Performed by: INTERNAL MEDICINE

## 2018-11-13 RX ORDER — ATORVASTATIN CALCIUM 20 MG/1
TABLET, FILM COATED ORAL
Qty: 90 TABLET | Refills: 0 | Status: SHIPPED | OUTPATIENT
Start: 2018-11-13 | End: 2019-10-30

## 2018-11-15 ENCOUNTER — MED REC SCAN ONLY (OUTPATIENT)
Dept: INTERNAL MEDICINE CLINIC | Facility: CLINIC | Age: 68
End: 2018-11-15

## 2018-11-15 ENCOUNTER — TELEPHONE (OUTPATIENT)
Dept: OPHTHALMOLOGY | Facility: CLINIC | Age: 68
End: 2018-11-15

## 2018-11-15 DIAGNOSIS — E11.9 TYPE 2 DIABETES MELLITUS WITHOUT COMPLICATION, WITH LONG-TERM CURRENT USE OF INSULIN (HCC): Primary | ICD-10-CM

## 2018-11-15 DIAGNOSIS — Z79.4 TYPE 2 DIABETES MELLITUS WITHOUT COMPLICATION, WITH LONG-TERM CURRENT USE OF INSULIN (HCC): Primary | ICD-10-CM

## 2018-11-15 NOTE — TELEPHONE ENCOUNTER
Pt has an appt tomorrow with Dr. Olga Nava for 6 month foot care. Pt needs a referral to be seen.  Thank you

## 2018-11-16 ENCOUNTER — OFFICE VISIT (OUTPATIENT)
Dept: PODIATRY CLINIC | Facility: CLINIC | Age: 68
End: 2018-11-16

## 2018-11-16 DIAGNOSIS — E11.42 TYPE 2 DIABETES MELLITUS WITH DIABETIC POLYNEUROPATHY, WITHOUT LONG-TERM CURRENT USE OF INSULIN (HCC): Primary | ICD-10-CM

## 2018-11-16 DIAGNOSIS — B35.1 ONYCHOMYCOSIS: ICD-10-CM

## 2018-11-16 PROCEDURE — 11721 DEBRIDE NAIL 6 OR MORE: CPT | Performed by: PODIATRIST

## 2018-11-16 NOTE — PROGRESS NOTES
HPI:    Patient ID: Anna Marie Belle is a 76year old female. This 69-year-old diabetic presents for evaluation and care. She saw Aurelia Lockett on August 7, 2018. Her most recent A1c was 6.7 and her fasting blood sugar today was 123.       ROS:   I did review is wearing accommodative diabetic shoes. Careful and complete debridement of all 10 toenails was accomplished today without incident. I reduced nail, subungual debris, and some keratosis. No ulcerations or infections were noted.   Reminded this patient o

## 2018-12-14 ENCOUNTER — MED REC SCAN ONLY (OUTPATIENT)
Dept: INTERNAL MEDICINE CLINIC | Facility: CLINIC | Age: 68
End: 2018-12-14

## 2019-01-22 ENCOUNTER — OFFICE VISIT (OUTPATIENT)
Dept: INTERNAL MEDICINE CLINIC | Facility: CLINIC | Age: 69
End: 2019-01-22
Payer: MEDICARE

## 2019-01-22 VITALS
SYSTOLIC BLOOD PRESSURE: 135 MMHG | WEIGHT: 156 LBS | BODY MASS INDEX: 27.64 KG/M2 | HEIGHT: 63 IN | TEMPERATURE: 98 F | RESPIRATION RATE: 12 BRPM | HEART RATE: 79 BPM | DIASTOLIC BLOOD PRESSURE: 82 MMHG | OXYGEN SATURATION: 99 %

## 2019-01-22 DIAGNOSIS — I10 ESSENTIAL HYPERTENSION: ICD-10-CM

## 2019-01-22 DIAGNOSIS — E11.42 TYPE 2 DIABETES MELLITUS WITH DIABETIC POLYNEUROPATHY, WITHOUT LONG-TERM CURRENT USE OF INSULIN (HCC): Primary | ICD-10-CM

## 2019-01-22 DIAGNOSIS — I50.32 CHRONIC DIASTOLIC CONGESTIVE HEART FAILURE, NYHA CLASS 1 (HCC): ICD-10-CM

## 2019-01-22 DIAGNOSIS — Z86.73 HISTORY OF CVA (CEREBROVASCULAR ACCIDENT): ICD-10-CM

## 2019-01-22 PROCEDURE — 99214 OFFICE O/P EST MOD 30 MIN: CPT | Performed by: INTERNAL MEDICINE

## 2019-01-22 PROCEDURE — G0008 ADMIN INFLUENZA VIRUS VAC: HCPCS | Performed by: INTERNAL MEDICINE

## 2019-01-22 PROCEDURE — 90653 IIV ADJUVANT VACCINE IM: CPT | Performed by: INTERNAL MEDICINE

## 2019-01-22 NOTE — PROGRESS NOTES
HPI:    Patient ID: Rigo Cabrera is a 76year old female. HPI  Pt comes in for follow up, over all doing ok no complaint, taking meds. Review of Systems   HENT: Negative. Eyes: Negative. Respiratory: Negative. Cardiovascular: Negative. leg    • Other and unspecified hyperlipidemia    • Stroke [facility]) 1/2014   • Type II or unspecified type diabetes mellitus without mention of complication, not stated as uncontrolled    • Unspecified essential hypertension       Past Surgical History:   Proc were placed in this encounter.       Meds This Visit:  Requested Prescriptions      No prescriptions requested or ordered in this encounter       Imaging & Referrals:  None        PE#[id number]

## 2019-02-21 RX ORDER — LISINOPRIL AND HYDROCHLOROTHIAZIDE 25; 20 MG/1; MG/1
TABLET ORAL
Qty: 90 TABLET | Refills: 0 | Status: SHIPPED | OUTPATIENT
Start: 2019-02-21 | End: 2019-04-24

## 2019-04-05 ENCOUNTER — TELEPHONE (OUTPATIENT)
Dept: INTERNAL MEDICINE CLINIC | Facility: CLINIC | Age: 69
End: 2019-04-05

## 2019-04-08 ENCOUNTER — MED REC SCAN ONLY (OUTPATIENT)
Dept: INTERNAL MEDICINE CLINIC | Facility: CLINIC | Age: 69
End: 2019-04-08

## 2019-04-08 RX ORDER — METOPROLOL SUCCINATE 25 MG/1
TABLET, EXTENDED RELEASE ORAL
Qty: 90 TABLET | Refills: 1 | Status: SHIPPED | OUTPATIENT
Start: 2019-04-08 | End: 2020-11-12

## 2019-04-08 NOTE — TELEPHONE ENCOUNTER
Please review; protocol failed.   Requested Prescriptions     Pending Prescriptions Disp Refills   • METOPROLOL SUCCINATE ER 25 MG Oral Tablet 24 Hr [Pharmacy Med Name: METOPROLOL SUCCINATE ER 25 MG Tablet Extended Release 24 Hour] 90 tablet 1     Sig: TAKE

## 2019-04-09 ENCOUNTER — TELEPHONE (OUTPATIENT)
Dept: INTERNAL MEDICINE CLINIC | Facility: CLINIC | Age: 69
End: 2019-04-09

## 2019-04-09 DIAGNOSIS — E11.9 TYPE 2 DIABETES MELLITUS WITHOUT COMPLICATION, WITHOUT LONG-TERM CURRENT USE OF INSULIN (HCC): Primary | ICD-10-CM

## 2019-04-09 RX ORDER — BLOOD-GLUCOSE METER
EACH MISCELLANEOUS
Qty: 1 KIT | Refills: 0 | Status: SHIPPED | OUTPATIENT
Start: 2019-04-09 | End: 2021-11-30

## 2019-04-09 NOTE — TELEPHONE ENCOUNTER
Refill passed per [facility] protocol.   Diabetic Supplies  Protocol Criteria:  · Appointment scheduled in past 12 months or the next 3 months

## 2019-04-09 NOTE — TELEPHONE ENCOUNTER
Can we check with pt what dose on the Neurontin an dhow long she has been taking , I will send the glucose meter supplies

## 2019-04-09 NOTE — TELEPHONE ENCOUNTER
Per pharmacy, patient is looking for refill on Gabapentin and alcohol swabs for blood sugar testing. Not on patient's med list so I cannot refill electronically.

## 2019-04-10 NOTE — TELEPHONE ENCOUNTER
Message left for pt to call us back with neurontin dose and how long pt kam been on this medication.

## 2019-04-15 ENCOUNTER — MA CHART PREP (OUTPATIENT)
Dept: FAMILY MEDICINE CLINIC | Facility: CLINIC | Age: 69
End: 2019-04-15

## 2019-04-15 RX ORDER — BLOOD SUGAR DIAGNOSTIC
STRIP MISCELLANEOUS
Qty: 300 STRIP | Refills: 1 | Status: SHIPPED | OUTPATIENT
Start: 2019-04-15 | End: 2019-05-16

## 2019-04-15 NOTE — TELEPHONE ENCOUNTER
Refill passed per [facility] protocol.   Diabetic Supplies  Protocol Criteria:  · Appointment scheduled in past 12 months or the next 3 months

## 2019-04-17 NOTE — TELEPHONE ENCOUNTER
Please review; protocol failed.     Requested Prescriptions     Pending Prescriptions Disp Refills   • METFORMIN  MG Oral Tab [Pharmacy Med Name: METFORMIN HYDROCHLORIDE 850 MG Tablet] 270 tablet 3     Sig: TAKE 1 TABLET THREE TIMES DAILY         Rec

## 2019-04-19 ENCOUNTER — OFFICE VISIT (OUTPATIENT)
Dept: INTERNAL MEDICINE CLINIC | Facility: CLINIC | Age: 69
End: 2019-04-19
Payer: MEDICARE

## 2019-04-19 VITALS
WEIGHT: 154 LBS | SYSTOLIC BLOOD PRESSURE: 131 MMHG | DIASTOLIC BLOOD PRESSURE: 80 MMHG | BODY MASS INDEX: 27 KG/M2 | OXYGEN SATURATION: 98 % | TEMPERATURE: 98 F | HEART RATE: 81 BPM

## 2019-04-19 DIAGNOSIS — Z11.59 NEED FOR HEPATITIS C SCREENING TEST: ICD-10-CM

## 2019-04-19 DIAGNOSIS — Z11.4 SCREENING FOR HIV (HUMAN IMMUNODEFICIENCY VIRUS): ICD-10-CM

## 2019-04-19 DIAGNOSIS — I63.50 CEREBRAL ARTERY OCCLUSION WITH CEREBRAL INFARCTION (HCC): ICD-10-CM

## 2019-04-19 DIAGNOSIS — I65.23 CAROTID ARTERY PLAQUE, BILATERAL: ICD-10-CM

## 2019-04-19 DIAGNOSIS — E78.5 HYPERLIPIDEMIA, UNSPECIFIED HYPERLIPIDEMIA TYPE: ICD-10-CM

## 2019-04-19 DIAGNOSIS — Z86.79 HISTORY OF CEREBRAL ARTERY OCCLUSION: ICD-10-CM

## 2019-04-19 DIAGNOSIS — I10 ESSENTIAL HYPERTENSION: ICD-10-CM

## 2019-04-19 DIAGNOSIS — Z12.11 SCREENING FOR COLON CANCER: ICD-10-CM

## 2019-04-19 DIAGNOSIS — Z00.00 ENCOUNTER FOR ANNUAL HEALTH EXAMINATION: ICD-10-CM

## 2019-04-19 DIAGNOSIS — R26.9 ABNORMALITY OF GAIT AS LATE EFFECT OF CEREBROVASCULAR ACCIDENT (CVA): ICD-10-CM

## 2019-04-19 DIAGNOSIS — H25.13 AGE-RELATED NUCLEAR CATARACT OF BOTH EYES: ICD-10-CM

## 2019-04-19 DIAGNOSIS — Z13.1 SCREENING FOR DIABETES MELLITUS (DM): ICD-10-CM

## 2019-04-19 DIAGNOSIS — Z78.0 POSTMENOPAUSAL: ICD-10-CM

## 2019-04-19 DIAGNOSIS — Z00.00 ENCOUNTER FOR MEDICARE ANNUAL WELLNESS EXAM: Primary | ICD-10-CM

## 2019-04-19 DIAGNOSIS — I50.32 CHRONIC DIASTOLIC CONGESTIVE HEART FAILURE, NYHA CLASS 1 (HCC): ICD-10-CM

## 2019-04-19 DIAGNOSIS — Z86.73 HISTORY OF CVA (CEREBROVASCULAR ACCIDENT): ICD-10-CM

## 2019-04-19 DIAGNOSIS — I69.398 ABNORMALITY OF GAIT AS LATE EFFECT OF CEREBROVASCULAR ACCIDENT (CVA): ICD-10-CM

## 2019-04-19 DIAGNOSIS — R47.1 DYSARTHRIA: ICD-10-CM

## 2019-04-19 DIAGNOSIS — Z13.6 SCREENING FOR CARDIOVASCULAR CONDITION: ICD-10-CM

## 2019-04-19 DIAGNOSIS — E11.9 TYPE 2 DIABETES MELLITUS WITHOUT COMPLICATION, WITHOUT LONG-TERM CURRENT USE OF INSULIN (HCC): ICD-10-CM

## 2019-04-19 PROCEDURE — G0439 PPPS, SUBSEQ VISIT: HCPCS | Performed by: INTERNAL MEDICINE

## 2019-04-19 PROCEDURE — 93000 ELECTROCARDIOGRAM COMPLETE: CPT | Performed by: INTERNAL MEDICINE

## 2019-04-19 PROCEDURE — 96160 PT-FOCUSED HLTH RISK ASSMT: CPT | Performed by: INTERNAL MEDICINE

## 2019-04-19 PROCEDURE — 36415 COLL VENOUS BLD VENIPUNCTURE: CPT | Performed by: INTERNAL MEDICINE

## 2019-04-19 PROCEDURE — 99397 PER PM REEVAL EST PAT 65+ YR: CPT | Performed by: INTERNAL MEDICINE

## 2019-04-19 NOTE — PROGRESS NOTES
HPI:   Niko Andrews is a 76year old female who presents for a MA (Medicare Advantage) [facility] (Once per calendar year). Medicare annual overall she is doing okay denies any complaints taking her meds.     Annual Physical due on 04/20/2019 occlusion with cerebral infarction [facility])     Essential hypertension     Hyperlipidemia     Speech disturbance     Abnormality of gait as late effect of cerebrovascular accident (CVA)     Age-related nuclear cataract of both eyes     Type 2 diabetes jocelynitu by mouth.    ONETOUCH DELICA LANCETS 71L Does not apply Misc use as directed twice daily   aspirin 81 MG Oral Tab EC TAKE 1 TABLET BY MOUTH EVERY DAY   melatonin 3 MG Oral Tab Take one tab at night 30 min before going to bed      MEDICAL INFORMATION:   She misunderstand some words in a sentence and need to ask people to repeat themselves:  No   I especially have trouble understanding the speech of women and children:  No I have trouble understanding the speaker in a large room such as at a meeting or place o Future  -     ELECTROCARDIOGRAM, COMPLETE  -     HIV AG AB COMBO  -     HIV AG AB COMBO LAVENDER HOLD  -     CBC W/ DIFFERENTIAL    Type 2 diabetes mellitus without complication, without long-term current use of insulin [facility]- will retest,t watch diet and HIV AG AB COMBO  -     HIV AG AB COMBO LAVENDER HOLD  -     CBC W/ DIFFERENTIAL    Essential hypertension- continue with current care   -     OCCULT BLOOD, FECAL, IMMUNOASSAY; Future  -     LIPID PANEL; Future  -     HCV ANTIBODY;  Future  -     HIV AG AB C IMMUNOASSAY; Future  -     LIPID PANEL; Future  -     HCV ANTIBODY; Future  -     HIV AG AB COMBO; Future  -     XR DEXA BONE DENSITOMETRY (CPT=84392); Future  -     COMP METABOLIC PANEL (14);  Future  -     HEMOGLOBIN A1C; Future  -     TSH W REFLEX TO IVAN Future  -     HEMOGLOBIN A1C; Future  -     TSH W REFLEX TO FREE T4; Future  -     CBC WITH DIFFERENTIAL WITH PLATELET; Future  -     MICROALB/CREAT RATIO, RANDOM URINE;  Future  -     ELECTROCARDIOGRAM, COMPLETE  -     HIV AG AB COMBO  -     HIV AG AB COMB Future  -     ELECTROCARDIOGRAM, COMPLETE  -     HIV AG AB COMBO  -     HIV AG AB COMBO LAVENDER HOLD  -     CBC W/ DIFFERENTIAL    Screening for diabetes mellitus (DM)  -     OCCULT BLOOD, FECAL, IMMUNOASSAY; Future  -     LIPID PANEL;  Future  -     HCV A assessment: good     PLAN:  The patient indicates understanding of these issues and agrees to the plan. Reinforced healthy diet, lifestyle, and exercise. Return in 3 months (on 7/19/2019).      Laureen Molina MD, 4/19/2019     General Health     In the pa 01/17/2017    No flowsheet data found. Pap and Pelvic      Pap: Every 3 yrs age 21-68 or Pap+HPV every 5 yrs age 33-67, age 72 and older at high risk There are no preventive care reminders to display for this patient.  Update [employer] if applic CREATININE (P) (mg/dL)   Date Value   03/13/2015 0.75    No flowsheet data found. Drug Serum Conc  Annually No results found for: DIGOXIN, DIG, VALP No flowsheet data found.        Diabetes      HgbA1C  Annually GLYCOHEMOGLOBIN (HgA1c) (L) (%)   Date 1 TABLET BY MOUTH DAILY Disp: 90 tablet Rfl: 0   Pyridoxine HCl (VITAMIN B-6 OR) Take by mouth. Disp:  Rfl:    CALCIUM OR Take by mouth. Disp:  Rfl:    Cholecalciferol (VITAMIN D-3 OR) Take by mouth.  Disp:  Rfl:    ONETOUCH DELICA LANCETS 21G Does not appl cardiovascular condition  Encounter for annual health examination  Postmenopausal  Screening for colon cancer  Screening for diabetes mellitus (dm)  Screening for hiv (human immunodeficiency virus)  Need for hepatitis c screening test    Orders Placed This

## 2019-04-19 NOTE — PATIENT INSTRUCTIONS
Travis August's SCREENING SCHEDULE   Tests on this list are recommended by your physician but may not be covered, or covered at this frequency, by your insurer. Please check with your insurance carrier before scheduling to verify coverage.    PREVENTATI who are 73-68 years old and have smoked more than 100 cigarettes in their lifetime   • Anyone with a family history    Colorectal Cancer Screening  Covered up to Age 76     Colonoscopy Screen   Covered every 10 years- more often if abnormal Colonoscopy due Annually Orders placed or performed in visit on 11/11/16   • FLU VACC PRSV FREE INC ANTIG   Orders placed or performed in visit on 10/14/14   • FLU VAC NO PRSV 4 FRANKLIN 3 YRS+    Please get every year    Pneumococcal 13 (Prevnar)  Covered Once after 65 Orders and printer. (the forms are also available in [address])  [email or web address] org  This link also has information from the [facility] regarding Advance Directives.

## 2019-04-24 ENCOUNTER — OFFICE VISIT (OUTPATIENT)
Dept: OPHTHALMOLOGY | Facility: CLINIC | Age: 69
End: 2019-04-24
Payer: MEDICARE

## 2019-04-24 DIAGNOSIS — H43.393 FLOATER, VITREOUS, BILATERAL: ICD-10-CM

## 2019-04-24 DIAGNOSIS — E11.9 DIABETES MELLITUS TYPE 2 WITHOUT RETINOPATHY (HCC): Primary | ICD-10-CM

## 2019-04-24 DIAGNOSIS — H25.13 AGE-RELATED NUCLEAR CATARACT OF BOTH EYES: ICD-10-CM

## 2019-04-24 PROCEDURE — 92015 DETERMINE REFRACTIVE STATE: CPT | Performed by: OPHTHALMOLOGY

## 2019-04-24 PROCEDURE — 92014 COMPRE OPH EXAM EST PT 1/>: CPT | Performed by: OPHTHALMOLOGY

## 2019-04-24 RX ORDER — LISINOPRIL AND HYDROCHLOROTHIAZIDE 25; 20 MG/1; MG/1
TABLET ORAL
Qty: 90 TABLET | Refills: 1 | Status: SHIPPED | OUTPATIENT
Start: 2019-04-24 | End: 2019-09-03

## 2019-04-24 NOTE — ASSESSMENT & PLAN NOTE
Discussed mild cataracts in both eyes that are not affecting vision and are not surgical at this time. New glasses today; update as needed. Discussed that new prescription is only a slight change.

## 2019-04-24 NOTE — PROGRESS NOTES
Melisa Corral is a 76year old female.     HPI:     HPI     Consult      Additional comments: Consult per Dr. Vicky Camarena              Diabetic Eye Exam      Additional comments: Pt has been a diabetic for 5 years  5 years on pills/  0 years on Insulin PLUS) w/Device Does not apply Kit USE UTD Disp: 1 kit Rfl: 0   METOPROLOL SUCCINATE ER 25 MG Oral Tablet 24 Hr TAKE 1 TABLET EVERY DAY Disp: 90 tablet Rfl: 1   LISINOPRIL-HYDROCHLOROTHIAZIDE 20-25 MG Oral Tab TAKE 1 TABLET EVERY DAY Disp: 90 tablet Rfl: 0 Conjunctiva/Sclera Normal Normal    Cornea Clear, 1+ arcus  Clear    Anterior Chamber Deep and quiet Deep and quiet    Iris Normal Normal    Lens 2-3+ Nuclear sclerosis, Trace Cortical cataract 2-3+ Nuclear sclerosis, Trace Cortical cataract inferiorly

## 2019-04-24 NOTE — TELEPHONE ENCOUNTER
Refill passed per [facility] protocol.   Hypertensive Medications  Protocol Criteria:  · Appointment scheduled in the past 6 months or in the next 3 months  · BMP or CMP in the past 12 months  · Creatinine result < 2  Recent Outpatient Visits

## 2019-04-24 NOTE — PATIENT INSTRUCTIONS
Diabetes mellitus type 2 without retinopathy ([facility] [address]  Diabetes type II: no background of retinopathy, no signs of neovascularization noted. Discussed ocular and systemic benefits of blood sugar control.   Diagnosis and treatment discussed in detail with félix

## 2019-04-25 ENCOUNTER — HOSPITAL ENCOUNTER (OUTPATIENT)
Dept: BONE DENSITY | Facility: HOSPITAL | Age: 69
Discharge: HOME | End: 2019-04-25
Attending: INTERNAL MEDICINE
Payer: MEDICARE

## 2019-04-25 DIAGNOSIS — Z86.79 HISTORY OF CEREBRAL ARTERY OCCLUSION: ICD-10-CM

## 2019-04-25 DIAGNOSIS — Z13.1 SCREENING FOR DIABETES MELLITUS (DM): ICD-10-CM

## 2019-04-25 DIAGNOSIS — Z00.00 ENCOUNTER FOR ANNUAL HEALTH EXAMINATION: ICD-10-CM

## 2019-04-25 DIAGNOSIS — Z11.4 SCREENING FOR HIV (HUMAN IMMUNODEFICIENCY VIRUS): ICD-10-CM

## 2019-04-25 DIAGNOSIS — Z00.00 ENCOUNTER FOR MEDICARE ANNUAL WELLNESS EXAM: ICD-10-CM

## 2019-04-25 DIAGNOSIS — I10 ESSENTIAL HYPERTENSION: ICD-10-CM

## 2019-04-25 DIAGNOSIS — Z11.59 NEED FOR HEPATITIS C SCREENING TEST: ICD-10-CM

## 2019-04-25 DIAGNOSIS — I69.398 ABNORMALITY OF GAIT AS LATE EFFECT OF CEREBROVASCULAR ACCIDENT (CVA): ICD-10-CM

## 2019-04-25 DIAGNOSIS — I63.50 CEREBRAL ARTERY OCCLUSION WITH CEREBRAL INFARCTION (HCC): ICD-10-CM

## 2019-04-25 DIAGNOSIS — Z86.73 HISTORY OF CVA (CEREBROVASCULAR ACCIDENT): ICD-10-CM

## 2019-04-25 DIAGNOSIS — I50.32 CHRONIC DIASTOLIC CONGESTIVE HEART FAILURE, NYHA CLASS 1 (HCC): ICD-10-CM

## 2019-04-25 DIAGNOSIS — H25.13 AGE-RELATED NUCLEAR CATARACT OF BOTH EYES: ICD-10-CM

## 2019-04-25 DIAGNOSIS — R26.9 ABNORMALITY OF GAIT AS LATE EFFECT OF CEREBROVASCULAR ACCIDENT (CVA): ICD-10-CM

## 2019-04-25 DIAGNOSIS — Z78.0 POSTMENOPAUSAL: ICD-10-CM

## 2019-04-25 DIAGNOSIS — R47.1 DYSARTHRIA: ICD-10-CM

## 2019-04-25 DIAGNOSIS — I65.23 CAROTID ARTERY PLAQUE, BILATERAL: ICD-10-CM

## 2019-04-25 DIAGNOSIS — E78.5 HYPERLIPIDEMIA, UNSPECIFIED HYPERLIPIDEMIA TYPE: ICD-10-CM

## 2019-04-25 DIAGNOSIS — Z13.6 SCREENING FOR CARDIOVASCULAR CONDITION: ICD-10-CM

## 2019-04-25 DIAGNOSIS — E11.9 TYPE 2 DIABETES MELLITUS WITHOUT COMPLICATION, WITHOUT LONG-TERM CURRENT USE OF INSULIN (HCC): ICD-10-CM

## 2019-04-25 DIAGNOSIS — Z12.11 SCREENING FOR COLON CANCER: ICD-10-CM

## 2019-04-25 PROCEDURE — 77080 DXA BONE DENSITY AXIAL: CPT | Performed by: INTERNAL MEDICINE

## 2019-05-16 ENCOUNTER — MED REC SCAN ONLY (OUTPATIENT)
Dept: INTERNAL MEDICINE CLINIC | Facility: CLINIC | Age: 69
End: 2019-05-16

## 2019-05-16 RX ORDER — BLOOD SUGAR DIAGNOSTIC
STRIP MISCELLANEOUS
Qty: 300 STRIP | Refills: 1 | Status: SHIPPED | OUTPATIENT
Start: 2019-05-16 | End: 2019-05-17

## 2019-05-16 NOTE — TELEPHONE ENCOUNTER
Refill passed per [facility], [facility] protocol.   Diabetic Supplies  Protocol Criteria:  · Appointment scheduled in past 12 months or the next 3 months

## 2019-05-17 RX ORDER — BLOOD SUGAR DIAGNOSTIC
STRIP MISCELLANEOUS
Qty: 300 STRIP | Refills: 1 | Status: SHIPPED | OUTPATIENT
Start: 2019-05-17 | End: 2019-09-03

## 2019-05-18 ENCOUNTER — TELEPHONE (OUTPATIENT)
Dept: PODIATRY CLINIC | Facility: CLINIC | Age: 69
End: 2019-05-18

## 2019-05-18 DIAGNOSIS — E11.9 TYPE 2 DIABETES MELLITUS WITHOUT COMPLICATION, WITH LONG-TERM CURRENT USE OF INSULIN (HCC): Primary | ICD-10-CM

## 2019-05-18 DIAGNOSIS — Z79.4 TYPE 2 DIABETES MELLITUS WITHOUT COMPLICATION, WITH LONG-TERM CURRENT USE OF INSULIN (HCC): Primary | ICD-10-CM

## 2019-05-21 ENCOUNTER — OFFICE VISIT (OUTPATIENT)
Dept: PODIATRY CLINIC | Facility: CLINIC | Age: 69
End: 2019-05-21
Payer: MEDICARE

## 2019-05-21 DIAGNOSIS — E11.9 TYPE 2 DIABETES MELLITUS WITHOUT COMPLICATION, WITH LONG-TERM CURRENT USE OF INSULIN (HCC): Primary | ICD-10-CM

## 2019-05-21 DIAGNOSIS — Z79.4 TYPE 2 DIABETES MELLITUS WITHOUT COMPLICATION, WITH LONG-TERM CURRENT USE OF INSULIN (HCC): Primary | ICD-10-CM

## 2019-05-21 DIAGNOSIS — B35.1 ONYCHOMYCOSIS: ICD-10-CM

## 2019-05-21 PROCEDURE — 11721 DEBRIDE NAIL 6 OR MORE: CPT | Performed by: PODIATRIST

## 2019-05-21 NOTE — PROGRESS NOTES
HPI:    Patient ID: Mariana De Santiago is a 76year old female. This 42-year-old diabetic presents for evaluation and care. She saw Arabella Cole on April 19, 2019. Her most recent A1c was 7.2 and her fasting blood sugar today was 123.     ROS:     I did jonathan There is some loss of sensation consistent with sensory neuropathy. Hygiene is excellent her shoes are appropriate. Careful and complete debridement of all 10 toenails was accomplished today without incident.   I reduced nail, subungual debris, and some

## 2019-07-23 ENCOUNTER — TELEPHONE (OUTPATIENT)
Dept: INTERNAL MEDICINE CLINIC | Facility: CLINIC | Age: 69
End: 2019-07-23

## 2019-07-23 ENCOUNTER — NURSE ONLY (OUTPATIENT)
Dept: INTERNAL MEDICINE CLINIC | Facility: CLINIC | Age: 69
End: 2019-07-23
Payer: MEDICARE

## 2019-07-23 DIAGNOSIS — Z01.89 LABORATORY TEST: Primary | ICD-10-CM

## 2019-07-23 DIAGNOSIS — E11.9 TYPE 2 DIABETES MELLITUS WITHOUT COMPLICATION, WITHOUT LONG-TERM CURRENT USE OF INSULIN (HCC): ICD-10-CM

## 2019-07-23 DIAGNOSIS — E11.9 TYPE 2 DIABETES MELLITUS WITHOUT COMPLICATION, WITHOUT LONG-TERM CURRENT USE OF INSULIN (HCC): Primary | ICD-10-CM

## 2019-07-23 PROCEDURE — 36415 COLL VENOUS BLD VENIPUNCTURE: CPT | Performed by: INTERNAL MEDICINE

## 2019-07-23 NOTE — TELEPHONE ENCOUNTER
Patient is in the office to get recheck her a1c need order .  Can she have it done here since she has to get a ride to the office

## 2019-07-23 NOTE — PROGRESS NOTES
Patient present at the office for an A1C 3mos f/u.  She was asked if she was here to see the doctor as well, patient states to have a 6mos f/u with Dr Jabier Marks, only hear for A1C draw and couldn't go to another place as she had a hard time getting a ride here

## 2019-07-24 LAB
EST. AVERAGE GLUCOSE BLD GHB EST-MCNC: 148 MG/DL (ref 68–126)
HBA1C MFR BLD HPLC: 6.8 % (ref ?–5.7)

## 2019-08-15 ENCOUNTER — OFFICE VISIT (OUTPATIENT)
Dept: INTERNAL MEDICINE CLINIC | Facility: CLINIC | Age: 69
End: 2019-08-15
Payer: MEDICARE

## 2019-08-15 VITALS
WEIGHT: 151 LBS | HEIGHT: 63 IN | TEMPERATURE: 98 F | RESPIRATION RATE: 17 BRPM | HEART RATE: 69 BPM | DIASTOLIC BLOOD PRESSURE: 81 MMHG | BODY MASS INDEX: 26.75 KG/M2 | SYSTOLIC BLOOD PRESSURE: 150 MMHG

## 2019-08-15 DIAGNOSIS — I63.9 CEREBROVASCULAR ACCIDENT (CVA), UNSPECIFIED MECHANISM (HCC): ICD-10-CM

## 2019-08-15 DIAGNOSIS — R53.1 RIGHT SIDED WEAKNESS: ICD-10-CM

## 2019-08-15 DIAGNOSIS — R26.9 GAIT ABNORMALITY: ICD-10-CM

## 2019-08-15 DIAGNOSIS — G89.29 CHRONIC PAIN OF RIGHT KNEE: Primary | ICD-10-CM

## 2019-08-15 DIAGNOSIS — M25.561 CHRONIC PAIN OF RIGHT KNEE: Primary | ICD-10-CM

## 2019-08-15 PROCEDURE — 99214 OFFICE O/P EST MOD 30 MIN: CPT | Performed by: INTERNAL MEDICINE

## 2019-08-15 NOTE — PROGRESS NOTES
HPI:    Patient ID: Anna Marie Belle is a 71year old female.     HPI  Present with complaint of right knee pain as per patient is been going on for some time but lately is gotten worse pain with walking and certain movements no injury is the same side where tablet Rfl: 0     Allergies:No Known Allergies   PHYSICAL EXAM:   Physical Exam   Constitutional: She is oriented to person, place, and time. She appears well-developed and well-nourished. HENT:   Head: Normocephalic and atraumatic.    Eyes: Pupils are eq

## 2019-08-16 ENCOUNTER — HOSPITAL ENCOUNTER (OUTPATIENT)
Dept: GENERAL RADIOLOGY | Facility: HOSPITAL | Age: 69
Discharge: HOME | End: 2019-08-16
Attending: INTERNAL MEDICINE
Payer: MEDICARE

## 2019-08-16 DIAGNOSIS — G89.29 CHRONIC PAIN OF RIGHT KNEE: ICD-10-CM

## 2019-08-16 DIAGNOSIS — M25.561 CHRONIC PAIN OF RIGHT KNEE: ICD-10-CM

## 2019-08-16 PROCEDURE — 73562 X-RAY EXAM OF KNEE 3: CPT | Performed by: INTERNAL MEDICINE

## 2019-08-20 ENCOUNTER — OFFICE VISIT (OUTPATIENT)
Dept: INTERNAL MEDICINE CLINIC | Facility: CLINIC | Age: 69
End: 2019-08-20
Payer: MEDICARE

## 2019-08-20 VITALS
TEMPERATURE: 98 F | HEIGHT: 63 IN | BODY MASS INDEX: 27.11 KG/M2 | HEART RATE: 69 BPM | RESPIRATION RATE: 17 BRPM | DIASTOLIC BLOOD PRESSURE: 82 MMHG | SYSTOLIC BLOOD PRESSURE: 152 MMHG | WEIGHT: 153 LBS

## 2019-08-20 DIAGNOSIS — M17.11 PRIMARY OSTEOARTHRITIS OF RIGHT KNEE: Primary | ICD-10-CM

## 2019-08-20 PROCEDURE — 99214 OFFICE O/P EST MOD 30 MIN: CPT | Performed by: INTERNAL MEDICINE

## 2019-08-21 NOTE — PROGRESS NOTES
HPI:    Patient ID: Glo Lovett is a 71year old female. HPI  And here with complaint of right knee pain and wants to have a cortisone shot. X-ray showed arthritis. Review of Systems   Constitutional: Negative. HENT: Negative.     Eyes: Negative Numbness and tingling of leg    • Other and unspecified hyperlipidemia    • Stroke [facility]) 1/2014   • Type II or unspecified type diabetes mellitus without mention of complication, not stated as uncontrolled    • Unspecified essential hypertension       Past encounter.       Meds This Visit:  Requested Prescriptions      No prescriptions requested or ordered in this encounter       Imaging & Referrals:  None        #[id number]

## 2019-09-04 RX ORDER — LISINOPRIL AND HYDROCHLOROTHIAZIDE 25; 20 MG/1; MG/1
TABLET ORAL
Qty: 90 TABLET | Refills: 1 | Status: SHIPPED | OUTPATIENT
Start: 2019-09-04 | End: 2020-01-28

## 2019-09-04 RX ORDER — BLOOD SUGAR DIAGNOSTIC
STRIP MISCELLANEOUS
Qty: 300 STRIP | Refills: 3 | Status: SHIPPED | OUTPATIENT
Start: 2019-09-04

## 2019-09-05 ENCOUNTER — MED REC SCAN ONLY (OUTPATIENT)
Dept: INTERNAL MEDICINE CLINIC | Facility: CLINIC | Age: 69
End: 2019-09-05

## 2019-09-05 NOTE — TELEPHONE ENCOUNTER
Refill passed per [facility], [facility] protocol.   Refill Protocol Appointment Criteria  · Appointment scheduled in the past 12 months or in the next 3 months  Recent Outpatient Visits            2 weeks ago Primary osteoarthritis of right knee    Ana Granados Radha Aldridge, DORCAS    Office Visit    4 months ago Diabetes mellitus type 2 without retinopathy [facility])    [facility] Ophthalmology Sarath Weber MD    Office Visit        Future Appointments       Provider Departm (H) 04/19/2019    BUN 16 04/19/2019    CREATSERUM 1.00 04/19/2019    BUNCREA 16.0 04/19/2019    GFRNAA 58 (L) 04/19/2019    GFRAA 67 04/19/2019    CA 9.8 04/19/2019    ALKPHOS 54 03/13/2015    AST 15 04/19/2019    ALT 31 04/19/2019    BILT 0.5 04/19/2019

## 2019-09-23 ENCOUNTER — HOSPITAL ENCOUNTER (OUTPATIENT)
Dept: MAMMOGRAPHY | Facility: HOSPITAL | Age: 69
Discharge: HOME | End: 2019-09-23
Attending: INTERNAL MEDICINE
Payer: MEDICARE

## 2019-09-23 DIAGNOSIS — Z12.31 ENCOUNTER FOR SCREENING MAMMOGRAM FOR MALIGNANT NEOPLASM OF BREAST: ICD-10-CM

## 2019-09-23 PROCEDURE — 77067 SCR MAMMO BI INCL CAD: CPT | Performed by: INTERNAL MEDICINE

## 2019-09-23 PROCEDURE — 77063 BREAST TOMOSYNTHESIS BI: CPT | Performed by: INTERNAL MEDICINE

## 2019-09-30 ENCOUNTER — APPOINTMENT (OUTPATIENT)
Dept: GENERAL RADIOLOGY | Facility: HOSPITAL | Age: 69
End: 2019-09-30
Payer: MEDICARE

## 2019-09-30 ENCOUNTER — HOSPITAL ENCOUNTER (EMERGENCY)
Facility: HOSPITAL | Age: 69
Discharge: HOME | End: 2019-09-30
Payer: MEDICARE

## 2019-09-30 VITALS
RESPIRATION RATE: 18 BRPM | OXYGEN SATURATION: 99 % | HEIGHT: 63 IN | BODY MASS INDEX: 27.11 KG/M2 | WEIGHT: 153 LBS | DIASTOLIC BLOOD PRESSURE: 79 MMHG | SYSTOLIC BLOOD PRESSURE: 144 MMHG | TEMPERATURE: 97 F | HEART RATE: 70 BPM

## 2019-09-30 DIAGNOSIS — M25.561 ACUTE PAIN OF RIGHT KNEE: Primary | ICD-10-CM

## 2019-09-30 PROCEDURE — 73562 X-RAY EXAM OF KNEE 3: CPT

## 2019-09-30 PROCEDURE — 99284 EMERGENCY DEPT VISIT MOD MDM: CPT

## 2019-09-30 RX ORDER — IBUPROFEN 600 MG/1
600 TABLET ORAL EVERY 8 HOURS PRN
Qty: 30 TABLET | Refills: 0 | Status: SHIPPED | OUTPATIENT
Start: 2019-09-30 | End: 2019-10-07

## 2019-10-18 ENCOUNTER — MED REC SCAN ONLY (OUTPATIENT)
Dept: INTERNAL MEDICINE CLINIC | Facility: CLINIC | Age: 69
End: 2019-10-18

## 2019-10-18 ENCOUNTER — OFFICE VISIT (OUTPATIENT)
Dept: INTERNAL MEDICINE CLINIC | Facility: CLINIC | Age: 69
End: 2019-10-18
Payer: MEDICARE

## 2019-10-18 VITALS
TEMPERATURE: 98 F | HEART RATE: 71 BPM | DIASTOLIC BLOOD PRESSURE: 78 MMHG | WEIGHT: 149 LBS | OXYGEN SATURATION: 97 % | HEIGHT: 63 IN | SYSTOLIC BLOOD PRESSURE: 130 MMHG | RESPIRATION RATE: 17 BRPM | BODY MASS INDEX: 26.4 KG/M2

## 2019-10-18 DIAGNOSIS — I10 ESSENTIAL HYPERTENSION: Primary | ICD-10-CM

## 2019-10-18 DIAGNOSIS — Z00.00 PREVENTATIVE HEALTH CARE: ICD-10-CM

## 2019-10-18 DIAGNOSIS — Z86.73 HISTORY OF CVA (CEREBROVASCULAR ACCIDENT): ICD-10-CM

## 2019-10-18 DIAGNOSIS — E11.9 TYPE 2 DIABETES MELLITUS WITHOUT COMPLICATION, WITHOUT LONG-TERM CURRENT USE OF INSULIN (HCC): ICD-10-CM

## 2019-10-18 DIAGNOSIS — M25.561 CHRONIC PAIN OF RIGHT KNEE: ICD-10-CM

## 2019-10-18 DIAGNOSIS — G89.29 CHRONIC PAIN OF RIGHT KNEE: ICD-10-CM

## 2019-10-18 DIAGNOSIS — E78.5 HYPERLIPIDEMIA, UNSPECIFIED HYPERLIPIDEMIA TYPE: ICD-10-CM

## 2019-10-18 PROCEDURE — 99214 OFFICE O/P EST MOD 30 MIN: CPT | Performed by: INTERNAL MEDICINE

## 2019-10-18 PROCEDURE — 90662 IIV NO PRSV INCREASED AG IM: CPT | Performed by: INTERNAL MEDICINE

## 2019-10-18 PROCEDURE — G0008 ADMIN INFLUENZA VIRUS VAC: HCPCS | Performed by: INTERNAL MEDICINE

## 2019-10-18 NOTE — PROGRESS NOTES
HPI:    Patient ID: Dulce Lenz is a 71year old female.     HPI  For follow-up overall she is doing okay denies any new complaints except she has fallen and injured her knee she went to ER x-rays were done just some bruising feeling better but contin Known Allergies   PHYSICAL EXAM:   Physical Exam   Constitutional: She is oriented to person, place, and time. She appears well-developed and well-nourished. HENT:   Head: Normocephalic and atraumatic.    Eyes: Pupils are equal, round, and reactive to lig

## 2019-11-01 RX ORDER — ATORVASTATIN CALCIUM 20 MG/1
TABLET, FILM COATED ORAL
Qty: 90 TABLET | Refills: 1 | Status: SHIPPED | OUTPATIENT
Start: 2019-11-01 | End: 2020-05-13

## 2019-11-01 NOTE — TELEPHONE ENCOUNTER
Refill passed per [facility], [facility] protocol.   Cholesterol Medications  Protocol Criteria:  · Appointment scheduled in the past 12 months or in the next 3 months  · ALT & LDL on file in the past 12 months  · ALT result < 80  · LDL result <130   Recent Outpat

## 2019-11-19 ENCOUNTER — OFFICE VISIT (OUTPATIENT)
Dept: INTERNAL MEDICINE CLINIC | Facility: CLINIC | Age: 69
End: 2019-11-19
Payer: MEDICARE

## 2019-11-19 VITALS
OXYGEN SATURATION: 98 % | HEART RATE: 76 BPM | WEIGHT: 149 LBS | HEIGHT: 63 IN | TEMPERATURE: 98 F | RESPIRATION RATE: 18 BRPM | DIASTOLIC BLOOD PRESSURE: 80 MMHG | SYSTOLIC BLOOD PRESSURE: 130 MMHG | BODY MASS INDEX: 26.4 KG/M2

## 2019-11-19 DIAGNOSIS — M17.11 PRIMARY OSTEOARTHRITIS OF RIGHT KNEE: Primary | ICD-10-CM

## 2019-11-19 PROCEDURE — 99213 OFFICE O/P EST LOW 20 MIN: CPT | Performed by: INTERNAL MEDICINE

## 2019-11-19 RX ORDER — CLOPIDOGREL BISULFATE 75 MG/1
75 TABLET ORAL
Qty: 90 TABLET | Refills: 1 | Status: SHIPPED | OUTPATIENT
Start: 2019-11-19 | End: 2020-05-13

## 2019-11-19 NOTE — PROGRESS NOTES
HPI:    Patient ID: Sebastien Sanchez is a 71year old female. HPI  Pt come sin today for steroid knee injection, last time it was injected was in August and it helped a lot. Pt otherwise doing well   Review of Systems   Constitutional: Negative.     HEN Type II or unspecified type diabetes mellitus without mention of complication, not stated as uncontrolled    • Unspecified essential hypertension       Past Surgical History:   Procedure Laterality Date   • CYST ASPIRATION RIGHT Right    • HYSTERECTOMY types were placed in this encounter. Meds This Visit:  Requested Prescriptions     Signed Prescriptions Disp Refills   • Clopidogrel Bisulfate 75 MG Oral Tab 90 tablet 1     Sig: Take 1 tablet (75 mg total) by mouth once daily.        Imaging & Referra

## 2019-12-06 ENCOUNTER — OFFICE VISIT (OUTPATIENT)
Dept: PODIATRY CLINIC | Facility: CLINIC | Age: 69
End: 2019-12-06
Payer: MEDICARE

## 2019-12-06 DIAGNOSIS — B35.1 ONYCHOMYCOSIS: ICD-10-CM

## 2019-12-06 DIAGNOSIS — Z79.4 TYPE 2 DIABETES MELLITUS WITHOUT COMPLICATION, WITH LONG-TERM CURRENT USE OF INSULIN (HCC): Primary | ICD-10-CM

## 2019-12-06 DIAGNOSIS — E11.9 TYPE 2 DIABETES MELLITUS WITHOUT COMPLICATION, WITH LONG-TERM CURRENT USE OF INSULIN (HCC): Primary | ICD-10-CM

## 2019-12-06 PROCEDURE — 11721 DEBRIDE NAIL 6 OR MORE: CPT | Performed by: PODIATRIST

## 2019-12-06 NOTE — PROGRESS NOTES
HPI:    Patient ID: Rui Giordano is a 71year old female. This 31-year-old diabetic presents for evaluation and care. I saw this patient approximately 6 months ago. She saw Sulaiman Emmanuel on November 19, 2019.   Her most recent A1c was 6.8 and her fasting appropriate. Careful and complete debridement of all 10 nails was accomplished today without incident. I reduced nail, subungual debris, and some keratosis. Upon debridement there is no ulceration or infection no open or draining was noted.   She is very

## 2019-12-20 ENCOUNTER — OFFICE VISIT (OUTPATIENT)
Dept: PHYSICAL THERAPY | Age: 69
End: 2019-12-20
Attending: INTERNAL MEDICINE
Payer: MEDICARE

## 2019-12-20 DIAGNOSIS — M25.561 CHRONIC PAIN OF RIGHT KNEE: ICD-10-CM

## 2019-12-20 DIAGNOSIS — G89.29 CHRONIC PAIN OF RIGHT KNEE: ICD-10-CM

## 2019-12-20 PROCEDURE — 97110 THERAPEUTIC EXERCISES: CPT

## 2019-12-20 PROCEDURE — 97162 PT EVAL MOD COMPLEX 30 MIN: CPT

## 2019-12-26 ENCOUNTER — OFFICE VISIT (OUTPATIENT)
Dept: PHYSICAL THERAPY | Age: 69
End: 2019-12-26
Attending: INTERNAL MEDICINE
Payer: MEDICARE

## 2019-12-26 DIAGNOSIS — G89.29 CHRONIC PAIN OF RIGHT KNEE: ICD-10-CM

## 2019-12-26 DIAGNOSIS — M25.561 CHRONIC PAIN OF RIGHT KNEE: ICD-10-CM

## 2019-12-26 PROCEDURE — 97110 THERAPEUTIC EXERCISES: CPT

## 2019-12-26 PROCEDURE — 97112 NEUROMUSCULAR REEDUCATION: CPT

## 2019-12-26 NOTE — PROGRESS NOTES
Dx: Chronic pain of right knee (M25.561,G89.29)         Insurance (Authorized # of Visits): [facility]  (10 per POC)         Authorizing Physician: Dr. Xochitl Kenney  Next MD visit: post therapy   Fall Risk: standard         Precautions: n/a             Subject flex to clear) 10x                       Manual: NA       HEP: (review); stair training     Charges:3 TE       Total Timed Treatment: 40 min  Total Treatment Time: 42 min

## 2019-12-31 ENCOUNTER — TELEPHONE (OUTPATIENT)
Dept: INTERNAL MEDICINE CLINIC | Facility: CLINIC | Age: 69
End: 2019-12-31

## 2019-12-31 NOTE — TELEPHONE ENCOUNTER
Pharmacy asking for refills on   1. Calcipotriene 0.005% Cream Apply 1-2grams to affected area 1-2x per day, do not apply to face. #360grams  2.  Desoximetasone 0.05% Topical Cream Apply 1gram topically to affected area 2x daily, do not apply to face, colton

## 2020-01-02 ENCOUNTER — OFFICE VISIT (OUTPATIENT)
Dept: PHYSICAL THERAPY | Age: 70
End: 2020-01-02
Attending: INTERNAL MEDICINE
Payer: MEDICARE

## 2020-01-02 DIAGNOSIS — M25.561 CHRONIC PAIN OF RIGHT KNEE: ICD-10-CM

## 2020-01-02 DIAGNOSIS — G89.29 CHRONIC PAIN OF RIGHT KNEE: ICD-10-CM

## 2020-01-02 PROCEDURE — 97110 THERAPEUTIC EXERCISES: CPT

## 2020-01-02 NOTE — PROGRESS NOTES
Dx: Chronic pain of right knee (M25.561,G89.29)         Insurance (Authorized # of Visits): [facility]  (10 per POC)         Authorizing Physician: Dr. Alina Burkett  Next MD visit: post therapy   Fall Risk: standard         Precautions: n/a             Subject 5  Side stepping at bar 5x  NM Re-ed  Step ups 2 in (step up L, promoting R hip/knee flex to clear) 10x   There ex:  Passive light R gastroc stretch 10 x 10 cts  Quad sets 10x 5 cts  SAQ (bolster) R/L 10x2  Hooklying hip add SB 10 x 2  3 cts   Supine R/L h

## 2020-01-07 ENCOUNTER — OFFICE VISIT (OUTPATIENT)
Dept: PHYSICAL THERAPY | Age: 70
End: 2020-01-07
Attending: INTERNAL MEDICINE
Payer: MEDICARE

## 2020-01-07 DIAGNOSIS — M25.561 CHRONIC PAIN OF RIGHT KNEE: ICD-10-CM

## 2020-01-07 DIAGNOSIS — G89.29 CHRONIC PAIN OF RIGHT KNEE: ICD-10-CM

## 2020-01-07 PROCEDURE — 97110 THERAPEUTIC EXERCISES: CPT

## 2020-01-07 NOTE — PROGRESS NOTES
Dx: Chronic pain of right knee (M25.561,G89.29)         Insurance (Authorized # of Visits): [facility]  (10 per POC)         Authorizing Physician: Dr. Jeevan Howard MD visit: post therapy   Fall Risk: standard         Precautions: n/a             Subject cts  Quad sets 10x 5 cts  SAQ (bolster) R/L 10x2  Hooklying hip add SB 10 x 2  3 cts   Supine R/L hip fallouts 10x  Supine march 10x2  Standing march 10x  Standing R/L hip ext (toe tap) 10x  Side stepping at bar 5x  Mini sit back 10x   NM Re-ed  Step ups f

## 2020-01-09 ENCOUNTER — OFFICE VISIT (OUTPATIENT)
Dept: PHYSICAL THERAPY | Age: 70
End: 2020-01-09
Attending: INTERNAL MEDICINE
Payer: MEDICARE

## 2020-01-09 DIAGNOSIS — G89.29 CHRONIC PAIN OF RIGHT KNEE: ICD-10-CM

## 2020-01-09 DIAGNOSIS — M25.561 CHRONIC PAIN OF RIGHT KNEE: ICD-10-CM

## 2020-01-09 PROCEDURE — 97110 THERAPEUTIC EXERCISES: CPT

## 2020-01-09 NOTE — PROGRESS NOTES
Dx: Chronic pain of right knee (M25.561,G89.29)         Insurance (Authorized # of Visits): [facility]  (10 per POC)         Authorizing Physician: Dr. Jennifer Howard MD visit: post therapy   Fall Risk: standard         Precautions: n/a             Subject 10x  Standing hip flex 2x5  Standing march 2 x 5  Side stepping at bar 5x  NM Re-ed  Step ups 2 in (step up L, promoting R hip/knee flex to clear) 10x   There ex:  Passive light R gastroc stretch 10 x 10 cts  Quad sets 10x 5 cts  SAQ (bolster) R/L [place] In

## 2020-01-14 ENCOUNTER — OFFICE VISIT (OUTPATIENT)
Dept: PHYSICAL THERAPY | Age: 70
End: 2020-01-14
Attending: INTERNAL MEDICINE
Payer: MEDICARE

## 2020-01-14 DIAGNOSIS — G89.29 CHRONIC PAIN OF RIGHT KNEE: ICD-10-CM

## 2020-01-14 DIAGNOSIS — M25.561 CHRONIC PAIN OF RIGHT KNEE: ICD-10-CM

## 2020-01-14 PROCEDURE — 97110 THERAPEUTIC EXERCISES: CPT

## 2020-01-14 NOTE — PROGRESS NOTES
Dx: Chronic pain of right knee (M25.561,G89.29)         Insurance (Authorized # of Visits): [facility]  (10 per POC)         Authorizing Physician: Dr. Teressa Howard MD visit: post therapy   Fall Risk: standard         Precautions: n/a             Subject Re-ed  Step ups 2 in (step up L, promoting R hip/knee flex to clear) 10x   There ex:  Passive light R gastroc stretch 10 x 10 cts  Quad sets 10x 5 cts  SAQ (bolster) R/L 10x2  Hooklying hip add SB 10 x 2  3 cts   Supine R/L hip fallouts 10x  Supine march 1

## 2020-01-16 ENCOUNTER — OFFICE VISIT (OUTPATIENT)
Dept: PHYSICAL THERAPY | Age: 70
End: 2020-01-16
Attending: INTERNAL MEDICINE
Payer: MEDICARE

## 2020-01-16 DIAGNOSIS — G89.29 CHRONIC PAIN OF RIGHT KNEE: ICD-10-CM

## 2020-01-16 DIAGNOSIS — M25.561 CHRONIC PAIN OF RIGHT KNEE: ICD-10-CM

## 2020-01-16 PROCEDURE — 97112 NEUROMUSCULAR REEDUCATION: CPT

## 2020-01-16 PROCEDURE — 97110 THERAPEUTIC EXERCISES: CPT

## 2020-01-16 NOTE — PROGRESS NOTES
Dx: Chronic pain of right knee (M25.561,G89.29)         Insurance (Authorized # of Visits): [facility]  (10 per POC)         Authorizing Physician: Dr. Jennifer Howard MD visit: post therapy   Fall Risk: standard         Precautions: n/a             Subject 10x2 1 lb  Bridging 2 x 10  Hooklying hip add SB 10 x 2  3 cts   Prone knee bends 10x  Prone glut sets 10 x 5 cts   Standing march 20x  Standing hip flex/abd R/L 10x  R knee flexion (mid range-preventing hyperextension) 15x  Side stepping at bar 6x  Stair

## 2020-01-17 ENCOUNTER — OFFICE VISIT (OUTPATIENT)
Dept: INTERNAL MEDICINE CLINIC | Facility: CLINIC | Age: 70
End: 2020-01-17
Payer: MEDICARE

## 2020-01-17 VITALS
DIASTOLIC BLOOD PRESSURE: 80 MMHG | SYSTOLIC BLOOD PRESSURE: 130 MMHG | HEART RATE: 70 BPM | OXYGEN SATURATION: 98 % | TEMPERATURE: 97 F | RESPIRATION RATE: 17 BRPM | HEIGHT: 63 IN | WEIGHT: 147 LBS | BODY MASS INDEX: 26.05 KG/M2

## 2020-01-17 DIAGNOSIS — I10 ESSENTIAL HYPERTENSION: Primary | ICD-10-CM

## 2020-01-17 DIAGNOSIS — G89.29 CHRONIC PAIN OF LEFT HAND: ICD-10-CM

## 2020-01-17 DIAGNOSIS — L29.9 SCALP ITCH: ICD-10-CM

## 2020-01-17 DIAGNOSIS — M79.642 CHRONIC PAIN OF LEFT HAND: ICD-10-CM

## 2020-01-17 DIAGNOSIS — Z86.73 HISTORY OF CVA (CEREBROVASCULAR ACCIDENT): ICD-10-CM

## 2020-01-17 DIAGNOSIS — E11.9 TYPE 2 DIABETES MELLITUS WITHOUT COMPLICATION, WITHOUT LONG-TERM CURRENT USE OF INSULIN (HCC): ICD-10-CM

## 2020-01-17 LAB
CHOLEST SMN-MCNC: 122 MG/DL (ref ?–200)
EST. AVERAGE GLUCOSE BLD GHB EST-MCNC: 143 MG/DL (ref 68–126)
HBA1C MFR BLD HPLC: 6.6 % (ref ?–5.7)
HDLC SERPL-MCNC: 71 MG/DL (ref 40–59)
LDLC SERPL CALC-MCNC: 44 MG/DL (ref ?–100)
NONHDLC SERPL-MCNC: 51 MG/DL (ref ?–130)
PATIENT FASTING Y/N/NP: YES
TRIGL SERPL-MCNC: 36 MG/DL (ref 30–149)
VLDLC SERPL CALC-MCNC: 7 MG/DL (ref 0–30)

## 2020-01-17 PROCEDURE — 36415 COLL VENOUS BLD VENIPUNCTURE: CPT | Performed by: INTERNAL MEDICINE

## 2020-01-17 PROCEDURE — 99214 OFFICE O/P EST MOD 30 MIN: CPT | Performed by: INTERNAL MEDICINE

## 2020-01-17 NOTE — PROGRESS NOTES
HPI:    Patient ID: Jonathan Sheffield is a 71year old female.     HPI  For 3 months follow-up overall she is doing okay continues with therapy but she is complaining of some on and off left hand arm pain she admits to sleeping on that shoulder she does hav Numbness and tingling of leg    • Other and unspecified hyperlipidemia    • Stroke [facility]) 1/2014   • Type II or unspecified type diabetes mellitus without mention of complication, not stated as uncontrolled    • Unspecified essential hypertension       Past itch use head and  shoulders antidandruff shampoo if not better let us know    Orders Placed This Encounter      Lipid Panel [E], specimen      Hemoglobin A1C [E]      Meds This Visit:  Requested Prescriptions      No prescriptions requested or ordered in

## 2020-01-21 ENCOUNTER — OFFICE VISIT (OUTPATIENT)
Dept: PHYSICAL THERAPY | Age: 70
End: 2020-01-21
Attending: INTERNAL MEDICINE
Payer: MEDICARE

## 2020-01-21 DIAGNOSIS — M25.561 CHRONIC PAIN OF RIGHT KNEE: ICD-10-CM

## 2020-01-21 DIAGNOSIS — G89.29 CHRONIC PAIN OF RIGHT KNEE: ICD-10-CM

## 2020-01-21 PROCEDURE — 97110 THERAPEUTIC EXERCISES: CPT

## 2020-01-21 NOTE — PROGRESS NOTES
Dx: Chronic pain of right knee (M25.561,G89.29)         Insurance (Authorized # of Visits): [facility]  (10 per POC)         Authorizing Physician: Dr. Zamzam Reno  Next MD visit: post therapy   Fall Risk: standard         Precautions: n/a             Subject flexion/extension 10x  Standing march 20x  Standing hip flex/abd R/L 10x  Side stepping at bar 6x  Closed chain: R knee flexion (mid range-preventing hyperextension) 15x     There ex:  SAQ (bolster) R/L 10x2 2 lb   Supine march 10x2 2 lb  Bridging 2 x 10

## 2020-01-23 ENCOUNTER — APPOINTMENT (OUTPATIENT)
Dept: PHYSICAL THERAPY | Age: 70
End: 2020-01-23
Attending: INTERNAL MEDICINE
Payer: MEDICARE

## 2020-01-24 ENCOUNTER — OFFICE VISIT (OUTPATIENT)
Dept: PHYSICAL THERAPY | Age: 70
End: 2020-01-24
Attending: INTERNAL MEDICINE
Payer: MEDICARE

## 2020-01-24 PROCEDURE — 97110 THERAPEUTIC EXERCISES: CPT

## 2020-01-24 NOTE — PROGRESS NOTES
DISCHARGE SUMMARY   Dx: Chronic pain of right knee (M25.561,G89.29)         Insurance (Authorized # of Visits): [facility]  (10 per POC)         Authorizing Physician: Dr. Sera Tam  Next MD visit: post therapy   Fall Risk: standard         Precautions: n/a Tx#: 7/10 Date: 1/21/20   Tx#: 8/10 Date: 1/24/20   Tx#: 9/10   There ex:  SAQ (sotero) R/L 10x2 2 lb   Supine march 10x2 2 lb  Bridging 2 x 10  Hooklying hip add SB 10 x 2  3 cts   Prone knee bends 10x2  Prone glut sets 10 x 5 cts   Seated knee flexion

## 2020-01-28 RX ORDER — LISINOPRIL AND HYDROCHLOROTHIAZIDE 25; 20 MG/1; MG/1
TABLET ORAL
Qty: 90 TABLET | Refills: 1 | Status: SHIPPED | OUTPATIENT
Start: 2020-01-28 | End: 2020-08-31

## 2020-01-29 NOTE — TELEPHONE ENCOUNTER
Refill passed per [facility], [facility] protocol.   Hypertensive Medications  Protocol Criteria:  · Appointment scheduled in the past 6 months or in the next 3 months  · BMP or CMP in the past 12 months  · Creatinine result < 2  Recent Outpatient Visits pain of right knee    [address] Keerthi Flood    Office Visit    1 week ago Essential hypertension    Reggie Dotson [place], Filipe Wagner MD    Office Visit    1 week ago Chronic pain of

## 2020-05-13 RX ORDER — ATORVASTATIN CALCIUM 20 MG/1
TABLET, FILM COATED ORAL
Qty: 90 TABLET | Refills: 1 | Status: SHIPPED | OUTPATIENT
Start: 2020-05-13 | End: 2020-12-18

## 2020-05-13 RX ORDER — CLOPIDOGREL BISULFATE 75 MG/1
TABLET ORAL
Qty: 90 TABLET | Refills: 1 | Status: SHIPPED | OUTPATIENT
Start: 2020-05-13 | End: 2020-11-11

## 2020-06-11 ENCOUNTER — TELEPHONE (OUTPATIENT)
Dept: INTERNAL MEDICINE CLINIC | Facility: CLINIC | Age: 70
End: 2020-06-11

## 2020-06-11 NOTE — TELEPHONE ENCOUNTER
Patient calling and asking her upcoming office visit. Confirmed schedule appointment below with the patient.     Future Appointments   Date Time Provider Bubba Renteria   6/12/2020 11:15 AM Chava Ryan MD [id number] [id number]

## 2020-06-12 ENCOUNTER — OFFICE VISIT (OUTPATIENT)
Dept: INTERNAL MEDICINE CLINIC | Facility: CLINIC | Age: 70
End: 2020-06-12
Payer: MEDICARE

## 2020-06-12 VITALS
TEMPERATURE: 99 F | BODY MASS INDEX: 26.4 KG/M2 | RESPIRATION RATE: 18 BRPM | HEART RATE: 80 BPM | SYSTOLIC BLOOD PRESSURE: 132 MMHG | HEIGHT: 63 IN | DIASTOLIC BLOOD PRESSURE: 76 MMHG | OXYGEN SATURATION: 98 % | WEIGHT: 149 LBS

## 2020-06-12 DIAGNOSIS — I63.9 CEREBROVASCULAR ACCIDENT (CVA), UNSPECIFIED MECHANISM (HCC): ICD-10-CM

## 2020-06-12 DIAGNOSIS — M17.11 PRIMARY OSTEOARTHRITIS OF RIGHT KNEE: ICD-10-CM

## 2020-06-12 DIAGNOSIS — Z00.00 ENCOUNTER FOR ANNUAL HEALTH EXAMINATION: ICD-10-CM

## 2020-06-12 DIAGNOSIS — M25.561 CHRONIC PAIN OF RIGHT KNEE: ICD-10-CM

## 2020-06-12 DIAGNOSIS — Z86.73 HISTORY OF CVA (CEREBROVASCULAR ACCIDENT): ICD-10-CM

## 2020-06-12 DIAGNOSIS — D64.9 MILD ANEMIA: ICD-10-CM

## 2020-06-12 DIAGNOSIS — I69.398 ABNORMALITY OF GAIT AS LATE EFFECT OF CEREBROVASCULAR ACCIDENT (CVA): ICD-10-CM

## 2020-06-12 DIAGNOSIS — R26.9 ABNORMALITY OF GAIT AS LATE EFFECT OF CEREBROVASCULAR ACCIDENT (CVA): ICD-10-CM

## 2020-06-12 DIAGNOSIS — Z78.0 POSTMENOPAUSAL: ICD-10-CM

## 2020-06-12 DIAGNOSIS — Z00.00 MEDICARE ANNUAL WELLNESS VISIT, SUBSEQUENT: Primary | ICD-10-CM

## 2020-06-12 DIAGNOSIS — R47.1 DYSARTHRIA: ICD-10-CM

## 2020-06-12 DIAGNOSIS — I10 ESSENTIAL HYPERTENSION: ICD-10-CM

## 2020-06-12 DIAGNOSIS — R53.1 RIGHT SIDED WEAKNESS: ICD-10-CM

## 2020-06-12 DIAGNOSIS — I50.32 CHRONIC DIASTOLIC CONGESTIVE HEART FAILURE, NYHA CLASS 1 (HCC): ICD-10-CM

## 2020-06-12 DIAGNOSIS — E11.42 TYPE 2 DIABETES MELLITUS WITH DIABETIC POLYNEUROPATHY, WITHOUT LONG-TERM CURRENT USE OF INSULIN (HCC): ICD-10-CM

## 2020-06-12 DIAGNOSIS — E78.5 HYPERLIPIDEMIA, UNSPECIFIED HYPERLIPIDEMIA TYPE: ICD-10-CM

## 2020-06-12 DIAGNOSIS — H25.13 AGE-RELATED NUCLEAR CATARACT OF BOTH EYES: ICD-10-CM

## 2020-06-12 DIAGNOSIS — G89.29 CHRONIC PAIN OF RIGHT KNEE: ICD-10-CM

## 2020-06-12 DIAGNOSIS — I65.23 CAROTID ARTERY PLAQUE, BILATERAL: ICD-10-CM

## 2020-06-12 PROCEDURE — 96160 PT-FOCUSED HLTH RISK ASSMT: CPT | Performed by: INTERNAL MEDICINE

## 2020-06-12 PROCEDURE — 36415 COLL VENOUS BLD VENIPUNCTURE: CPT | Performed by: INTERNAL MEDICINE

## 2020-06-12 PROCEDURE — 99397 PER PM REEVAL EST PAT 65+ YR: CPT | Performed by: INTERNAL MEDICINE

## 2020-06-12 PROCEDURE — G0439 PPPS, SUBSEQ VISIT: HCPCS | Performed by: INTERNAL MEDICINE

## 2020-06-12 NOTE — PATIENT INSTRUCTIONS
Junaid August's SCREENING SCHEDULE   Tests on this list are recommended by your physician but may not be covered, or covered at this frequency, by your insurer. Please check with your insurance carrier before scheduling to verify coverage.    Jaylen Higuera every 10 years- more often if abnormal Colonoscopy due on 06/05/2000 Update [facility] if applicable    Flex Sigmoidoscopy Screen  Covered every 5 years No results found for this or any previous visit. No flowsheet data found.      Fecal Occult Bloo visit on 10/14/14   • FLU VAC NO PRSV 4 FRANKLIN 3 YRS+    Please get every year    Pneumococcal 13 (Prevnar)  Covered Once after 65 Orders placed or performed in visit on 05/13/16   • PNEUMOCOCCAL VACC, 13 FRANKLIN IM    Please get once after your 65th birthday [address] [facility] regarding Advance Directives. [facility] Colon August's SCREENING SCHEDULE   Tests on this list are recommended by your physician but may not be covered, or covered at this frequency, by your insurer.  Please check with your insuran Screening  Covered up to Age 76     Colonoscopy Screen   Covered every 10 years- more often if abnormal Colonoscopy due on 06/05/2000 Update Health Maintenance if applicable    Flex Sigmoidoscopy Screen  Covered every 5 years No results found for this or a FLU VACC PRSV FREE INC ANTIG   Orders placed or performed in visit on 10/14/14   • FLU VAC NO PRSV 4 FRANKLIN 3 YRS+    Please get every year    Pneumococcal 13 (Prevnar)  Covered Once after 65 Orders placed or performed in visit on 05/13/16   • PNEUMOCOCCAL VA [place])  www. [email or web address] org  This link also has information from the [address] [place] [address] regarding Advance Directives.

## 2020-06-13 NOTE — PROGRESS NOTES
HPI:   Ovidio Molina is a 79year old female who presents for a MA (Medicare Advantage) [address] [facility] (Once per calendar year).     Is in for Medicare annual overall she is doing okay denies any complaints except for continued pain in the right knee [facility])    Patient Active Problem List:     Cerebral artery occlusion with cerebral infarction [facility])     Essential hypertension     Hyperlipidemia     Speech disturbance     Abnormality of gait as late effect of cerebrovascular accident (CVA)     Age-rel (VITAMIN B-6 OR), Take by mouth. CALCIUM OR, Take by mouth. Cholecalciferol (VITAMIN D-3 OR), Take by mouth.   ONETOUCH DELICA LANCETS 20H Does not apply Misc, use as directed twice daily  aspirin 81 MG Oral Tab EC, TAKE 1 TABLET BY MOUTH EVERY DAY  senthil trouble hearing conversations in a noisy background such as a crowded room or restaurant:  No   I get confused about where sounds come from:  No I misunderstand some words in a sentence and need to ask people to repeat themselves:  No   I especially have t PLATELET; Future  -     COMP METABOLIC PANEL (14); Future  -     LIPID PANEL; Future  -     TSH W REFLEX TO FREE T4; Future  -     URINALYSIS, ROUTINE; Future  -     HEMOGLOBIN A1C; Future  -     MICROALB/CREAT RATIO, RANDOM URINE;  Future  -     CBC W/ DIF 06/12/2020 117 (H)          Cardiovascular Disease Screening     LDL Annually LDL Cholesterol (mg/dL)   Date Value   06/12/2020 37        EKG - w/ Initial Preventative Physical Exam only, or if medically necessary Electrocardiogram date04/19/2019       C in the same house as a HepB virus carrier   Homosexual men   Illicit injectable drug abusers     Tetanus Toxoid  Only covered with a cut with metal- TD and TDaP Not covered by Medicare Part B No vaccine history found This may be covered with your prescript ATORVASTATIN 20 MG Oral Tab TAKE 1 TABLET EVERY DAY 90 tablet 1   • CLOPIDOGREL BISULFATE 75 MG Oral Tab TAKE 1 TABLET EVERY DAY 90 tablet 1   • LISINOPRIL-HYDROCHLOROTHIAZIDE 20-25 MG Oral Tab TAKE 1 TABLET EVERY DAY 90 tablet 1   • METFORMIN  MG O diabetic polyneuropathy, without long-term current use of insulin (hcc)  Essential hypertension  Hyperlipidemia, unspecified hyperlipidemia type  Primary osteoarthritis of right knee  Encounter for annual health examination  Mild anemia  Abnormality of gai

## 2020-06-19 ENCOUNTER — OFFICE VISIT (OUTPATIENT)
Dept: INTERNAL MEDICINE CLINIC | Facility: CLINIC | Age: 70
End: 2020-06-19
Payer: MEDICARE

## 2020-06-19 VITALS
DIASTOLIC BLOOD PRESSURE: 82 MMHG | RESPIRATION RATE: 18 BRPM | TEMPERATURE: 99 F | OXYGEN SATURATION: 99 % | BODY MASS INDEX: 26.75 KG/M2 | WEIGHT: 151 LBS | HEIGHT: 63 IN | HEART RATE: 80 BPM | SYSTOLIC BLOOD PRESSURE: 136 MMHG

## 2020-06-19 DIAGNOSIS — M17.11 PRIMARY OSTEOARTHRITIS OF RIGHT KNEE: Primary | ICD-10-CM

## 2020-06-19 PROCEDURE — 99213 OFFICE O/P EST LOW 20 MIN: CPT | Performed by: INTERNAL MEDICINE

## 2020-06-21 NOTE — PROGRESS NOTES
HPI:    Patient ID: Jonathan Sheffield is a 79year old female. HPI  Patient comes in today for right knee injection for arthritis. She is gotten steroid injection from before and it helped. Review of Systems   Constitutional: Negative.     HENT: Rosaleen Apgar normal. No scleral icterus. Neck: Normal range of motion. Neck supple. Cardiovascular: Normal rate and regular rhythm. Pulmonary/Chest: Effort normal and breath sounds normal.   Abdominal: Soft.  Bowel sounds are normal.   Musculoskeletal: Normal rang

## 2020-07-20 ENCOUNTER — NURSE TRIAGE (OUTPATIENT)
Dept: INTERNAL MEDICINE CLINIC | Facility: CLINIC | Age: 70
End: 2020-07-20

## 2020-07-20 DIAGNOSIS — Z20.822 CLOSE EXPOSURE TO COVID-19 VIRUS: Primary | ICD-10-CM

## 2020-07-20 NOTE — TELEPHONE ENCOUNTER
Per patient she was exposed to a person who is Covid 19 positive but patient don't have symptoms right now but would like to know if she can have the testing. Patient would like to go to [facility] Fax#[phone].

## 2020-07-20 NOTE — TELEPHONE ENCOUNTER
Please reply to pool: EM RN TRIAGE    Action Requested: Summary for Provider     []  Critical Lab, Recommendations Needed  [] Need Additional Advice  []   FYI    [x]   Need Orders  [] Need Medications Sent to Pharmacy  []  Other     SUMMARY: Home care ad

## 2020-07-20 NOTE — TELEPHONE ENCOUNTER
Spoke with patient ( verified) and relayed Dr. Angel Cervantes below--patient verbalizes understanding and agreement. Order faxed to [facility] at # below per patient request. No further questions/concerns at this time.

## 2020-07-22 NOTE — TELEPHONE ENCOUNTER
Patient calling reports was COVID tested at [facility] and they were supposed to fax results to Dr. Solitario Torres  office     Spoke with Tatiana Braswell at [facility] ,  They do not have items at this time     Provided fax number for [facility] office . pt il call Daniel

## 2020-08-31 RX ORDER — LISINOPRIL AND HYDROCHLOROTHIAZIDE 25; 20 MG/1; MG/1
TABLET ORAL
Qty: 90 TABLET | Refills: 1 | Status: SHIPPED | OUTPATIENT
Start: 2020-08-31 | End: 2021-06-07

## 2020-09-12 ENCOUNTER — OFFICE VISIT (OUTPATIENT)
Dept: INTERNAL MEDICINE CLINIC | Facility: CLINIC | Age: 70
End: 2020-09-12
Payer: MEDICARE

## 2020-09-12 VITALS
DIASTOLIC BLOOD PRESSURE: 68 MMHG | HEART RATE: 75 BPM | WEIGHT: 148 LBS | OXYGEN SATURATION: 99 % | RESPIRATION RATE: 17 BRPM | SYSTOLIC BLOOD PRESSURE: 128 MMHG | TEMPERATURE: 98 F | HEIGHT: 63 IN | BODY MASS INDEX: 26.22 KG/M2

## 2020-09-12 DIAGNOSIS — R06.00 EXERTIONAL DYSPNEA: ICD-10-CM

## 2020-09-12 DIAGNOSIS — I10 ESSENTIAL HYPERTENSION: ICD-10-CM

## 2020-09-12 DIAGNOSIS — E78.5 HYPERLIPIDEMIA, UNSPECIFIED HYPERLIPIDEMIA TYPE: ICD-10-CM

## 2020-09-12 DIAGNOSIS — I63.50 CEREBRAL ARTERY OCCLUSION WITH CEREBRAL INFARCTION (HCC): ICD-10-CM

## 2020-09-12 DIAGNOSIS — E11.42 TYPE 2 DIABETES MELLITUS WITH DIABETIC POLYNEUROPATHY, WITHOUT LONG-TERM CURRENT USE OF INSULIN (HCC): Primary | ICD-10-CM

## 2020-09-12 PROCEDURE — 3008F BODY MASS INDEX DOCD: CPT | Performed by: INTERNAL MEDICINE

## 2020-09-12 PROCEDURE — 90662 IIV NO PRSV INCREASED AG IM: CPT | Performed by: INTERNAL MEDICINE

## 2020-09-12 PROCEDURE — 3074F SYST BP LT 130 MM HG: CPT | Performed by: INTERNAL MEDICINE

## 2020-09-12 PROCEDURE — G0008 ADMIN INFLUENZA VIRUS VAC: HCPCS | Performed by: INTERNAL MEDICINE

## 2020-09-12 PROCEDURE — 3078F DIAST BP <80 MM HG: CPT | Performed by: INTERNAL MEDICINE

## 2020-09-12 PROCEDURE — 99214 OFFICE O/P EST MOD 30 MIN: CPT | Performed by: INTERNAL MEDICINE

## 2020-09-14 NOTE — PROGRESS NOTES
HPI:    Patient ID: Rui Giordano is a 79year old female. HPI   pt come sin for follow up lately has noticed increased sob and fatigue with minor exertion     Review of Systems   Constitutional: Positive for fatigue. HENT: Negative.     Eyes: Nega mellitus without mention of complication, not stated as uncontrolled    • Unspecified essential hypertension       Past Surgical History:   Procedure Laterality Date   • CYST ASPIRATION RIGHT Right    • HYSTERECTOMY     • CLIFTON LOCALIZATION WIRE 1 SITE RIGHT [[id number]]      Meds This Visit:  Requested Prescriptions      No prescriptions requested or ordered in this encounter       Imaging & Referrals:  OPHTHALMOLOGY - INTERNAL  PODIATRY - INTERNAL  FLU VACC HIGH DOSE PRSV FREE  CARD NUC STRESS TEST LEXISCAN (CPT=

## 2020-09-22 ENCOUNTER — TELEPHONE (OUTPATIENT)
Dept: INTERNAL MEDICINE CLINIC | Facility: CLINIC | Age: 70
End: 2020-09-22

## 2020-09-22 ENCOUNTER — OFFICE VISIT (OUTPATIENT)
Dept: INTERNAL MEDICINE CLINIC | Facility: CLINIC | Age: 70
End: 2020-09-22
Payer: MEDICARE

## 2020-09-22 VITALS
RESPIRATION RATE: 18 BRPM | DIASTOLIC BLOOD PRESSURE: 80 MMHG | SYSTOLIC BLOOD PRESSURE: 138 MMHG | BODY MASS INDEX: 26.58 KG/M2 | TEMPERATURE: 100 F | HEART RATE: 76 BPM | HEIGHT: 63 IN | OXYGEN SATURATION: 99 % | WEIGHT: 150 LBS

## 2020-09-22 DIAGNOSIS — M17.11 PRIMARY OSTEOARTHRITIS OF RIGHT KNEE: Primary | ICD-10-CM

## 2020-09-22 PROCEDURE — 3008F BODY MASS INDEX DOCD: CPT | Performed by: INTERNAL MEDICINE

## 2020-09-22 PROCEDURE — 3079F DIAST BP 80-89 MM HG: CPT | Performed by: INTERNAL MEDICINE

## 2020-09-22 PROCEDURE — 3075F SYST BP GE 130 - 139MM HG: CPT | Performed by: INTERNAL MEDICINE

## 2020-09-22 PROCEDURE — 99214 OFFICE O/P EST MOD 30 MIN: CPT | Performed by: INTERNAL MEDICINE

## 2020-09-22 NOTE — TELEPHONE ENCOUNTER
The phone room called to see if the patient can come for a cortisone injection on the knee but she wanted to know if it can be a nurse visit or a Doctor visit. Informed her that the doctor is the only one who gives cortisone injection.  Patient will schedul

## 2020-09-24 NOTE — PROGRESS NOTES
HPI:    Patient ID: Kee Jeans is a 79year old female. HPI  Comes in today for cortisone injection for the right knee she has history of arthritis we have given her a shot before it helped. Review of Systems   Constitutional: Negative.     HENT: to light. Conjunctivae are normal. No scleral icterus. Neck: Normal range of motion. Neck supple. Cardiovascular: Normal rate and regular rhythm. Pulmonary/Chest: Effort normal and breath sounds normal.   Abdominal: Soft.  Bowel sounds are normal.   M

## 2020-09-25 ENCOUNTER — TELEPHONE (OUTPATIENT)
Dept: INTERNAL MEDICINE CLINIC | Facility: CLINIC | Age: 70
End: 2020-09-25

## 2020-09-25 NOTE — TELEPHONE ENCOUNTER
Rissa/  of [facility] is requesting refill of patient's medications Omega 3 Acid, Diabetic supplies, and Clobetasol.     Fax# [phone]

## 2020-09-25 NOTE — TELEPHONE ENCOUNTER
Spoke to patient to verify if she requested this from [facility] and she states she didn't request anything from them

## 2020-10-07 ENCOUNTER — TELEPHONE (OUTPATIENT)
Dept: INTERNAL MEDICINE CLINIC | Facility: CLINIC | Age: 70
End: 2020-10-07

## 2020-10-07 DIAGNOSIS — Z12.31 SCREENING MAMMOGRAM, ENCOUNTER FOR: Primary | ICD-10-CM

## 2020-10-07 NOTE — TELEPHONE ENCOUNTER
Spoke with patient, informed about the mammogram order, Central scheduling number provided [phone]. No question at this time.

## 2020-10-07 NOTE — TELEPHONE ENCOUNTER
Spoke with patient ( verified)--requesting order for her yearly mammogram    Patient requesting response today, if possible, so she can schedule    Please reply to pool: EM TRIAGE support

## 2020-10-10 ENCOUNTER — HOSPITAL ENCOUNTER (OUTPATIENT)
Dept: CV DIAGNOSTICS | Facility: HOSPITAL | Age: 70
Discharge: HOME | End: 2020-10-10
Attending: INTERNAL MEDICINE
Payer: MEDICARE

## 2020-10-10 DIAGNOSIS — E11.42 TYPE 2 DIABETES MELLITUS WITH DIABETIC POLYNEUROPATHY, WITHOUT LONG-TERM CURRENT USE OF INSULIN (HCC): ICD-10-CM

## 2020-10-10 DIAGNOSIS — E78.5 HYPERLIPIDEMIA, UNSPECIFIED HYPERLIPIDEMIA TYPE: ICD-10-CM

## 2020-10-10 DIAGNOSIS — I10 ESSENTIAL HYPERTENSION: ICD-10-CM

## 2020-10-10 DIAGNOSIS — I63.50 CEREBRAL ARTERY OCCLUSION WITH CEREBRAL INFARCTION (HCC): ICD-10-CM

## 2020-10-10 DIAGNOSIS — R06.00 EXERTIONAL DYSPNEA: ICD-10-CM

## 2020-10-10 PROCEDURE — 93018 CV STRESS TEST I&R ONLY: CPT | Performed by: INTERNAL MEDICINE

## 2020-10-10 PROCEDURE — 78452 HT MUSCLE IMAGE SPECT MULT: CPT | Performed by: INTERNAL MEDICINE

## 2020-10-10 PROCEDURE — 93017 CV STRESS TEST TRACING ONLY: CPT | Performed by: INTERNAL MEDICINE

## 2020-10-17 ENCOUNTER — HOSPITAL ENCOUNTER (OUTPATIENT)
Dept: MAMMOGRAPHY | Age: 70
Discharge: HOME | End: 2020-10-17
Attending: INTERNAL MEDICINE
Payer: MEDICARE

## 2020-10-17 DIAGNOSIS — Z12.31 SCREENING MAMMOGRAM, ENCOUNTER FOR: ICD-10-CM

## 2020-10-17 PROCEDURE — 77067 SCR MAMMO BI INCL CAD: CPT | Performed by: INTERNAL MEDICINE

## 2020-10-17 PROCEDURE — 77063 BREAST TOMOSYNTHESIS BI: CPT | Performed by: INTERNAL MEDICINE

## 2020-10-21 ENCOUNTER — OFFICE VISIT (OUTPATIENT)
Dept: PODIATRY CLINIC | Facility: CLINIC | Age: 70
End: 2020-10-21
Payer: MEDICARE

## 2020-10-21 DIAGNOSIS — Z79.4 TYPE 2 DIABETES MELLITUS WITHOUT COMPLICATION, WITH LONG-TERM CURRENT USE OF INSULIN (HCC): Primary | ICD-10-CM

## 2020-10-21 DIAGNOSIS — E11.9 TYPE 2 DIABETES MELLITUS WITHOUT COMPLICATION, WITH LONG-TERM CURRENT USE OF INSULIN (HCC): Primary | ICD-10-CM

## 2020-10-21 PROCEDURE — 99213 OFFICE O/P EST LOW 20 MIN: CPT | Performed by: PODIATRIST

## 2020-10-22 NOTE — PROGRESS NOTES
HPI:    Patient ID: Elkin aMnn is a 79year old female. This is a pleasant 79-year-old female presents to me today for a yearly diabetic foot evaluation. The last time I saw this patient was early December of last year.   On specific questioning sh going to bed 30 tablet 0     Allergies:No Known Allergies   PHYSICAL EXAM:     On physical exam pulses are noted. There is no evidence of edema nor erythema. Skin texture is quite good hair growth is noted although somewhat diminished.   There are no lelo

## 2020-11-11 RX ORDER — CLOPIDOGREL BISULFATE 75 MG/1
TABLET ORAL
Qty: 90 TABLET | Refills: 1 | Status: SHIPPED | OUTPATIENT
Start: 2020-11-11 | End: 2021-06-07

## 2020-11-12 RX ORDER — METOPROLOL SUCCINATE 25 MG/1
25 TABLET, EXTENDED RELEASE ORAL DAILY
Qty: 90 TABLET | Refills: 1 | Status: SHIPPED | OUTPATIENT
Start: 2020-11-12 | End: 2021-02-18

## 2020-12-18 ENCOUNTER — TELEPHONE (OUTPATIENT)
Dept: CASE MANAGEMENT | Age: 70
End: 2020-12-18

## 2020-12-18 RX ORDER — ATORVASTATIN CALCIUM 20 MG/1
20 TABLET, FILM COATED ORAL DAILY
Qty: 90 TABLET | Refills: 0 | Status: SHIPPED | OUTPATIENT
Start: 2020-12-18 | End: 2021-02-26

## 2020-12-18 NOTE — TELEPHONE ENCOUNTER
Reached patient for medication adherence consult. Patient is past due for refill on atorvastatin. She tells me she is still taking this medication. She is getting down to a couple weeks left and did consent [facility].  She

## 2020-12-19 ENCOUNTER — OFFICE VISIT (OUTPATIENT)
Dept: INTERNAL MEDICINE CLINIC | Facility: CLINIC | Age: 70
End: 2020-12-19
Payer: MEDICARE

## 2020-12-19 VITALS
HEIGHT: 63 IN | OXYGEN SATURATION: 99 % | TEMPERATURE: 98 F | WEIGHT: 148 LBS | BODY MASS INDEX: 26.22 KG/M2 | SYSTOLIC BLOOD PRESSURE: 116 MMHG | HEART RATE: 67 BPM | RESPIRATION RATE: 17 BRPM | DIASTOLIC BLOOD PRESSURE: 78 MMHG

## 2020-12-19 DIAGNOSIS — E78.5 HYPERLIPIDEMIA, UNSPECIFIED HYPERLIPIDEMIA TYPE: ICD-10-CM

## 2020-12-19 DIAGNOSIS — I10 ESSENTIAL HYPERTENSION: Primary | ICD-10-CM

## 2020-12-19 DIAGNOSIS — Z00.00 PREVENTATIVE HEALTH CARE: ICD-10-CM

## 2020-12-19 DIAGNOSIS — R26.9 ABNORMALITY OF GAIT AS LATE EFFECT OF CEREBROVASCULAR ACCIDENT (CVA): ICD-10-CM

## 2020-12-19 DIAGNOSIS — I69.398 ABNORMALITY OF GAIT AS LATE EFFECT OF CEREBROVASCULAR ACCIDENT (CVA): ICD-10-CM

## 2020-12-19 DIAGNOSIS — E11.42 TYPE 2 DIABETES MELLITUS WITH DIABETIC POLYNEUROPATHY, WITHOUT LONG-TERM CURRENT USE OF INSULIN (HCC): ICD-10-CM

## 2020-12-19 PROCEDURE — 3008F BODY MASS INDEX DOCD: CPT | Performed by: INTERNAL MEDICINE

## 2020-12-19 PROCEDURE — 3078F DIAST BP <80 MM HG: CPT | Performed by: INTERNAL MEDICINE

## 2020-12-19 PROCEDURE — 36415 COLL VENOUS BLD VENIPUNCTURE: CPT | Performed by: INTERNAL MEDICINE

## 2020-12-19 PROCEDURE — 99214 OFFICE O/P EST MOD 30 MIN: CPT | Performed by: INTERNAL MEDICINE

## 2020-12-19 PROCEDURE — 3074F SYST BP LT 130 MM HG: CPT | Performed by: INTERNAL MEDICINE

## 2020-12-20 NOTE — PROGRESS NOTES
HPI:    Patient ID: Rashad Moe is a 79year old female. HPI  Follow-up overall doing okay continues to have pain. No right knee we injected in September it helped but now the pain is returned    Review of Systems   Constitutional: Negative.     [facility]   • Type II or unspecified type diabetes mellitus without mention of complication, not stated as uncontrolled    • Unspecified essential hypertension       Past Surgical History:   Procedure Laterality Date   • CYST ASPIRATION RIGHT Right    • Panel [E], specimen      Meds This Visit:  Requested Prescriptions      No prescriptions requested or ordered in this encounter       Imaging & Referrals:  [facility]STRO - INTERNAL        #[id number]

## 2021-01-06 ENCOUNTER — OFFICE VISIT (OUTPATIENT)
Dept: OPHTHALMOLOGY | Facility: CLINIC | Age: 71
End: 2021-01-06
Payer: MEDICARE

## 2021-01-06 DIAGNOSIS — E11.9 DIABETES MELLITUS TYPE 2 WITHOUT RETINOPATHY (HCC): Primary | ICD-10-CM

## 2021-01-06 DIAGNOSIS — H25.13 AGE-RELATED NUCLEAR CATARACT OF BOTH EYES: ICD-10-CM

## 2021-01-06 DIAGNOSIS — H43.393 FLOATER, VITREOUS, BILATERAL: ICD-10-CM

## 2021-01-06 PROCEDURE — 92014 COMPRE OPH EXAM EST PT 1/>: CPT | Performed by: OPHTHALMOLOGY

## 2021-01-06 NOTE — PATIENT INSTRUCTIONS
Diabetes mellitus type 2 without retinopathy ([facility] [address]  Diabetes type II: no background of retinopathy, no signs of neovascularization noted. Discussed ocular and systemic benefits of blood sugar control.   Diagnosis and treatment discussed in detail with félix

## 2021-01-06 NOTE — ASSESSMENT & PLAN NOTE
Discussed slightly worsened  cataracts in both eyes that are not affecting vision and are not surgical at this time. Continue current glasses.

## 2021-01-06 NOTE — PROGRESS NOTES
Janelle Figueroa is a 79year old female.     HPI:     HPI     Diabetic Eye Exam      Additional comments: Pt has been a diabetic for 6 years       Pt's diabetes is currently controlled by pills  Pt checks BS 2x a day  Pt's last blood sugar was 107 this mo tablet 1   • LISINOPRIL-HYDROCHLOROTHIAZIDE 20-25 MG Oral Tab TAKE 1 TABLET EVERY DAY 90 tablet 1   • METFORMIN  MG Oral Tab TAKE 1 TABLET THREE TIMES DAILY 270 tablet 1   • Glucose Blood (ACCU-CHEK JOSE MANUEL PLUS) In Vitro Strip CHECK BLOOD SUGAR THREE Chamber Deep and quiet Deep and quiet    Iris Normal Normal    Lens 3+ Nuclear sclerosis, Trace Cortical cataract 3+ Nuclear sclerosis, Trace Cortical cataract inferiorly     Vitreous Vitreous floaters Vitreous floaters          Fundus Exam       Right Lef

## 2021-01-26 ENCOUNTER — MED REC SCAN ONLY (OUTPATIENT)
Dept: INTERNAL MEDICINE CLINIC | Facility: CLINIC | Age: 71
End: 2021-01-26

## 2021-01-29 DIAGNOSIS — Z23 NEED FOR VACCINATION: ICD-10-CM

## 2021-02-18 ENCOUNTER — TELEPHONE (OUTPATIENT)
Dept: INTERNAL MEDICINE CLINIC | Facility: CLINIC | Age: 71
End: 2021-02-18

## 2021-02-18 RX ORDER — METOPROLOL SUCCINATE 25 MG/1
25 TABLET, EXTENDED RELEASE ORAL DAILY
Qty: 10 TABLET | Refills: 0 | Status: SHIPPED | OUTPATIENT
Start: 2021-02-18 | End: 2021-06-07

## 2021-02-18 NOTE — TELEPHONE ENCOUNTER
[facility] pharmacy calling on behalf of patient because she is requesting a 10 day supply of her metropolol succinate 25 mg oral tab be sent to [facility] while she awaits the mail order script.  She has roughly 3 days left on this script

## 2021-02-21 NOTE — H&P
[facility] - Gastroenterology                                                                                                               Reason for consult: crc screening    Requesting physician or provider hyperlipidemia    • Stroke [facility]) 1/2014   • Type II or unspecified type diabetes mellitus without mention of complication, not stated as uncontrolled    • Unspecified essential hypertension       Past Surgical History:   Procedure Laterality Date   • COLON use as directed twice daily 100 each 6   • aspirin 81 MG Oral Tab EC TAKE 1 TABLET BY MOUTH EVERY DAY 30 tablet 2   • melatonin 3 MG Oral Tab Take one tab at night 30 min before going to bed 30 tablet 0   • SHINGRIX 50 MCG/0.5ML Intramuscular Recon Susp in bm to constipation and w/o clear antecedent. She is not on a bowel regimen and advised she start miralax. Last tsh 6/2020 and was normal.  Hgb 6/2020 11.5 and down from comparison. She denies brbpr, melena. She denies a FHx GI malignancy.   Given rec pick-up to drive patient home. Patient verbalized understanding and agrees to proceed with procedure as planned. Jaime Estrada. Devan Daniel, APRN   2/23/2021        This note was partially prepared using [employer] voice recognition dictation software.  As

## 2021-02-23 ENCOUNTER — TELEPHONE (OUTPATIENT)
Dept: GASTROENTEROLOGY | Facility: CLINIC | Age: 71
End: 2021-02-23

## 2021-02-23 ENCOUNTER — OFFICE VISIT (OUTPATIENT)
Dept: GASTROENTEROLOGY | Facility: CLINIC | Age: 71
End: 2021-02-23
Payer: MEDICARE

## 2021-02-23 VITALS
HEART RATE: 79 BPM | BODY MASS INDEX: 26.54 KG/M2 | WEIGHT: 149.81 LBS | DIASTOLIC BLOOD PRESSURE: 78 MMHG | TEMPERATURE: 98 F | SYSTOLIC BLOOD PRESSURE: 129 MMHG | HEIGHT: 63 IN

## 2021-02-23 DIAGNOSIS — Z12.11 COLON CANCER SCREENING: Primary | ICD-10-CM

## 2021-02-23 DIAGNOSIS — K59.00 CONSTIPATION, UNSPECIFIED CONSTIPATION TYPE: Primary | ICD-10-CM

## 2021-02-23 DIAGNOSIS — D64.9 NORMOCYTIC NORMOCHROMIC ANEMIA: ICD-10-CM

## 2021-02-23 DIAGNOSIS — D64.9 NORMOCYTIC ANEMIA: ICD-10-CM

## 2021-02-23 DIAGNOSIS — Z12.11 COLON CANCER SCREENING: ICD-10-CM

## 2021-02-23 DIAGNOSIS — K59.00 CONSTIPATION, UNSPECIFIED CONSTIPATION TYPE: ICD-10-CM

## 2021-02-23 PROCEDURE — 99204 OFFICE O/P NEW MOD 45 MIN: CPT | Performed by: NURSE PRACTITIONER

## 2021-02-23 PROCEDURE — 3078F DIAST BP <80 MM HG: CPT | Performed by: NURSE PRACTITIONER

## 2021-02-23 PROCEDURE — 3074F SYST BP LT 130 MM HG: CPT | Performed by: NURSE PRACTITIONER

## 2021-02-23 PROCEDURE — 3008F BODY MASS INDEX DOCD: CPT | Performed by: NURSE PRACTITIONER

## 2021-02-23 RX ORDER — POLYETHYLENE GLYCOL 3350, SODIUM CHLORIDE, SODIUM BICARBONATE, POTASSIUM CHLORIDE 420; 11.2; 5.72; 1.48 G/4L; G/4L; G/4L; G/4L
4000 POWDER, FOR SOLUTION ORAL AS DIRECTED
Qty: 4000 ML | Refills: 0 | Status: SHIPPED | OUTPATIENT
Start: 2021-02-23 | End: 2021-07-17

## 2021-02-23 NOTE — PATIENT INSTRUCTIONS
-miralax daily for constipation  1. Schedule colonoscopy with [facility] w/ dr. Christa Mejia [Diagnosis: crc screening, constipation, normocytic anemia]    2.  bowel prep from pharmacy ([facility])    3.  Contact dr. Chrissie Gregory for clearance to hold clopidogrel 5 d

## 2021-02-23 NOTE — TELEPHONE ENCOUNTER
Dr. Keiko Gordon-    Anticoagulant Adjustment Request:      GI is requesting to HOLD Clopidogrel for 5 days prior to scheduled procedure with Dr. Jarad Carpenter. Please advise if this is OK to do so. Thank you!

## 2021-02-23 NOTE — TELEPHONE ENCOUNTER
Dr Sandrita Perry 1st availability @ [facility] is on 5/14/2021. Patient is not a candidate for [facility]. Patient is on Plavix. I called and spoke to patient and offered to move her procedure to 3/09/2021 @ [facility].  Patient agreed to have me send new instructions via [employer],

## 2021-02-23 NOTE — TELEPHONE ENCOUNTER
Reviewed and confirmed below Clopidgrel orders with patient for her upcoming procedure on 03/09/2021:     Rosalba Alu to hold for 5 days and resume after procedure     Patient verbalized understanding and requested instruction to be sent via [employer] for reminder.

## 2021-02-25 ENCOUNTER — TELEPHONE (OUTPATIENT)
Dept: CASE MANAGEMENT | Age: 71
End: 2021-02-25

## 2021-02-25 NOTE — TELEPHONE ENCOUNTER
Ruthie Lora,    I am working on Radha's authorization for her colonoscopy. On your office notes from 2/23/21 it says 5 yr recall, but on her last colonoscopy it says 10 years. I was just wondering if she is having new symptoms (constipation?) that she is having it done now instead of 10 years? Please advise.     Thank you  [employer] [place] Satisfactory

## 2021-02-26 RX ORDER — ATORVASTATIN CALCIUM 20 MG/1
20 TABLET, FILM COATED ORAL DAILY
Qty: 90 TABLET | Refills: 0 | Status: SHIPPED | OUTPATIENT
Start: 2021-02-26 | End: 2021-06-07

## 2021-02-26 NOTE — TELEPHONE ENCOUNTER
I called to follow-up with patient about [employer] account which has not been activated. I am unable to send intstructions.    Patient did have instructions given to her previously and I went over them with her and her correct dates since she can not come to

## 2021-03-06 ENCOUNTER — LAB REQUISITION (OUTPATIENT)
Dept: LAB | Facility: HOSPITAL | Age: 71
End: 2021-03-06
Payer: MEDICARE

## 2021-03-06 DIAGNOSIS — Z01.818 ENCOUNTER FOR OTHER PREPROCEDURAL EXAMINATION: ICD-10-CM

## 2021-03-06 LAB — SARS-COV-2 RNA RESP QL NAA+PROBE: NOT DETECTED

## 2021-03-09 ENCOUNTER — SURGERY CENTER DOCUMENTATION (OUTPATIENT)
Dept: SURGERY | Age: 71
End: 2021-03-09

## 2021-03-09 ENCOUNTER — LAB REQUISITION (OUTPATIENT)
Dept: LAB | Facility: HOSPITAL | Age: 71
End: 2021-03-09
Payer: MEDICARE

## 2021-03-09 DIAGNOSIS — D64.9 ANEMIA, UNSPECIFIED: ICD-10-CM

## 2021-03-09 DIAGNOSIS — K59.00 CONSTIPATION, UNSPECIFIED: ICD-10-CM

## 2021-03-09 DIAGNOSIS — Z12.11 ENCOUNTER FOR SCREENING FOR MALIGNANT NEOPLASM OF COLON: ICD-10-CM

## 2021-03-09 PROCEDURE — 88305 TISSUE EXAM BY PATHOLOGIST: CPT | Performed by: INTERNAL MEDICINE

## 2021-03-09 PROCEDURE — 45380 COLONOSCOPY AND BIOPSY: CPT | Performed by: INTERNAL MEDICINE

## 2021-03-09 NOTE — PROCEDURES
COLONOSCOPY REPORT    [facility]     1950 Age 79year old   PCP Mitchel Muller MD Endoscopist Samara Burnette MD     Date of procedure: 21    Procedure: Colonoscopy w/ cold biopsy polypectomy    Pre-operative diagnosis: worsening constipatio revealed hemorrhoids. 5. The colonic mucosa throughout the colon showed normal vascular pattern, without evidence of angioectasias or inflammation. 6. BRAYAN: normal rectal tone, no masses palpated. Recommend:  · Await pathology.  The interval for

## 2021-03-11 ENCOUNTER — TELEPHONE (OUTPATIENT)
Dept: GASTROENTEROLOGY | Facility: CLINIC | Age: 71
End: 2021-03-11

## 2021-03-11 NOTE — TELEPHONE ENCOUNTER
5 year colonoscopy recall entered and health maintenance updated. Colonoscopy done on 3/9/21 and due on 3/9/26. Letter mailed to patient.

## 2021-03-11 NOTE — TELEPHONE ENCOUNTER
----- Message from Rain Uriarte MD sent at 3/11/2021  9:39 AM CST -----  GI staff: please place recall for colonoscopy in 5 years     Letter written

## 2021-03-13 ENCOUNTER — OFFICE VISIT (OUTPATIENT)
Dept: INTERNAL MEDICINE CLINIC | Facility: CLINIC | Age: 71
End: 2021-03-13
Payer: MEDICARE

## 2021-03-13 VITALS
RESPIRATION RATE: 18 BRPM | DIASTOLIC BLOOD PRESSURE: 74 MMHG | BODY MASS INDEX: 26.75 KG/M2 | SYSTOLIC BLOOD PRESSURE: 128 MMHG | HEART RATE: 75 BPM | OXYGEN SATURATION: 99 % | TEMPERATURE: 98 F | WEIGHT: 151 LBS | HEIGHT: 63 IN

## 2021-03-13 DIAGNOSIS — R53.1 RIGHT SIDED WEAKNESS: ICD-10-CM

## 2021-03-13 DIAGNOSIS — I65.23 CAROTID ARTERY PLAQUE, BILATERAL: ICD-10-CM

## 2021-03-13 DIAGNOSIS — E11.9 DIABETES MELLITUS TYPE 2 WITHOUT RETINOPATHY (HCC): ICD-10-CM

## 2021-03-13 DIAGNOSIS — Z00.00 ENCOUNTER FOR ANNUAL HEALTH EXAMINATION: ICD-10-CM

## 2021-03-13 DIAGNOSIS — E78.5 HYPERLIPIDEMIA, UNSPECIFIED HYPERLIPIDEMIA TYPE: ICD-10-CM

## 2021-03-13 DIAGNOSIS — M17.11 PRIMARY OSTEOARTHRITIS OF RIGHT KNEE: ICD-10-CM

## 2021-03-13 DIAGNOSIS — I63.9 CEREBROVASCULAR ACCIDENT (CVA), UNSPECIFIED MECHANISM (HCC): ICD-10-CM

## 2021-03-13 DIAGNOSIS — Z78.0 POSTMENOPAUSAL: ICD-10-CM

## 2021-03-13 DIAGNOSIS — R47.1 DYSARTHRIA: ICD-10-CM

## 2021-03-13 DIAGNOSIS — E11.42 TYPE 2 DIABETES MELLITUS WITH DIABETIC POLYNEUROPATHY, WITHOUT LONG-TERM CURRENT USE OF INSULIN (HCC): ICD-10-CM

## 2021-03-13 DIAGNOSIS — I10 ESSENTIAL HYPERTENSION: ICD-10-CM

## 2021-03-13 DIAGNOSIS — G89.29 CHRONIC PAIN OF RIGHT KNEE: ICD-10-CM

## 2021-03-13 DIAGNOSIS — I63.50 CEREBRAL ARTERY OCCLUSION WITH CEREBRAL INFARCTION (HCC): ICD-10-CM

## 2021-03-13 DIAGNOSIS — M25.561 CHRONIC PAIN OF RIGHT KNEE: ICD-10-CM

## 2021-03-13 DIAGNOSIS — I50.32 CHRONIC DIASTOLIC CONGESTIVE HEART FAILURE, NYHA CLASS 1 (HCC): ICD-10-CM

## 2021-03-13 DIAGNOSIS — I69.398 ABNORMALITY OF GAIT AS LATE EFFECT OF CEREBROVASCULAR ACCIDENT (CVA): ICD-10-CM

## 2021-03-13 DIAGNOSIS — R26.9 ABNORMALITY OF GAIT AS LATE EFFECT OF CEREBROVASCULAR ACCIDENT (CVA): ICD-10-CM

## 2021-03-13 DIAGNOSIS — Z00.00 MEDICARE ANNUAL WELLNESS VISIT, SUBSEQUENT: Primary | ICD-10-CM

## 2021-03-13 LAB
ALBUMIN SERPL-MCNC: 3.9 G/DL (ref 3.4–5)
ALBUMIN/GLOB SERPL: 1 {RATIO} (ref 1–2)
ALP LIVER SERPL-CCNC: 53 U/L
ALT SERPL-CCNC: 37 U/L
ANION GAP SERPL CALC-SCNC: 5 MMOL/L (ref 0–18)
AST SERPL-CCNC: 20 U/L (ref 15–37)
BASOPHILS # BLD AUTO: 0.01 X10(3) UL (ref 0–0.2)
BASOPHILS NFR BLD AUTO: 0.2 %
BILIRUB SERPL-MCNC: 0.5 MG/DL (ref 0.1–2)
BILIRUB UR QL: NEGATIVE
BUN BLD-MCNC: 14 MG/DL (ref 7–18)
BUN/CREAT SERPL: 14.1 (ref 10–20)
CALCIUM BLD-MCNC: 9.7 MG/DL (ref 8.5–10.1)
CHLORIDE SERPL-SCNC: 104 MMOL/L (ref 98–112)
CHOLEST SMN-MCNC: 115 MG/DL (ref ?–200)
CLARITY UR: CLEAR
CO2 SERPL-SCNC: 30 MMOL/L (ref 21–32)
COLOR UR: YELLOW
CREAT BLD-MCNC: 0.99 MG/DL
CREAT UR-SCNC: 233 MG/DL
DEPRECATED RDW RBC AUTO: 44.7 FL (ref 35.1–46.3)
EOSINOPHIL # BLD AUTO: 0.05 X10(3) UL (ref 0–0.7)
EOSINOPHIL NFR BLD AUTO: 1.1 %
ERYTHROCYTE [DISTWIDTH] IN BLOOD BY AUTOMATED COUNT: 14.8 % (ref 11–15)
GLOBULIN PLAS-MCNC: 3.8 G/DL (ref 2.8–4.4)
GLUCOSE BLD-MCNC: 117 MG/DL (ref 70–99)
GLUCOSE UR-MCNC: NEGATIVE MG/DL
HCT VFR BLD AUTO: 37.4 %
HDLC SERPL-MCNC: 73 MG/DL (ref 40–59)
HGB BLD-MCNC: 11.5 G/DL
HGB UR QL STRIP.AUTO: NEGATIVE
IMM GRANULOCYTES # BLD AUTO: 0.01 X10(3) UL (ref 0–1)
IMM GRANULOCYTES NFR BLD: 0.2 %
KETONES UR-MCNC: NEGATIVE MG/DL
LDLC SERPL CALC-MCNC: 36 MG/DL (ref ?–100)
LEUKOCYTE ESTERASE UR QL STRIP.AUTO: NEGATIVE
LYMPHOCYTES # BLD AUTO: 1.33 X10(3) UL (ref 1–4)
LYMPHOCYTES NFR BLD AUTO: 30.2 %
M PROTEIN MFR SERPL ELPH: 7.7 G/DL (ref 6.4–8.2)
MCH RBC QN AUTO: 25.4 PG (ref 26–34)
MCHC RBC AUTO-ENTMCNC: 30.7 G/DL (ref 31–37)
MCV RBC AUTO: 82.6 FL
MICROALBUMIN UR-MCNC: 1.59 MG/DL
MICROALBUMIN/CREAT 24H UR-RTO: 6.8 UG/MG (ref ?–30)
MONOCYTES # BLD AUTO: 0.26 X10(3) UL (ref 0.1–1)
MONOCYTES NFR BLD AUTO: 5.9 %
NEUTROPHILS # BLD AUTO: 2.75 X10 (3) UL (ref 1.5–7.7)
NEUTROPHILS # BLD AUTO: 2.75 X10(3) UL (ref 1.5–7.7)
NEUTROPHILS NFR BLD AUTO: 62.4 %
NITRITE UR QL STRIP.AUTO: NEGATIVE
NONHDLC SERPL-MCNC: 42 MG/DL (ref ?–130)
OSMOLALITY SERPL CALC.SUM OF ELEC: 290 MOSM/KG (ref 275–295)
PATIENT FASTING Y/N/NP: YES
PATIENT FASTING Y/N/NP: YES
PH UR: 5 [PH] (ref 5–8)
PLATELET # BLD AUTO: 240 10(3)UL (ref 150–450)
POTASSIUM SERPL-SCNC: 3.9 MMOL/L (ref 3.5–5.1)
PROT UR-MCNC: NEGATIVE MG/DL
RBC # BLD AUTO: 4.53 X10(6)UL
SODIUM SERPL-SCNC: 139 MMOL/L (ref 136–145)
SP GR UR STRIP: 1.02 (ref 1–1.03)
TRIGL SERPL-MCNC: 29 MG/DL (ref 30–149)
TSI SER-ACNC: 1.07 MIU/ML (ref 0.36–3.74)
UROBILINOGEN UR STRIP-ACNC: <2
VLDLC SERPL CALC-MCNC: 6 MG/DL (ref 0–30)
WBC # BLD AUTO: 4.4 X10(3) UL (ref 4–11)

## 2021-03-13 PROCEDURE — 3061F NEG MICROALBUMINURIA REV: CPT | Performed by: INTERNAL MEDICINE

## 2021-03-13 PROCEDURE — 36415 COLL VENOUS BLD VENIPUNCTURE: CPT | Performed by: INTERNAL MEDICINE

## 2021-03-13 PROCEDURE — 3074F SYST BP LT 130 MM HG: CPT | Performed by: INTERNAL MEDICINE

## 2021-03-13 PROCEDURE — 99397 PER PM REEVAL EST PAT 65+ YR: CPT | Performed by: INTERNAL MEDICINE

## 2021-03-13 PROCEDURE — 96160 PT-FOCUSED HLTH RISK ASSMT: CPT | Performed by: INTERNAL MEDICINE

## 2021-03-13 PROCEDURE — G0439 PPPS, SUBSEQ VISIT: HCPCS | Performed by: INTERNAL MEDICINE

## 2021-03-13 PROCEDURE — 3078F DIAST BP <80 MM HG: CPT | Performed by: INTERNAL MEDICINE

## 2021-03-13 PROCEDURE — 3008F BODY MASS INDEX DOCD: CPT | Performed by: INTERNAL MEDICINE

## 2021-03-13 NOTE — PROGRESS NOTES
HPI:   Theresa Marquez is a 79year old female who presents for a Medicare Subsequent Annual Wellness visit (Pt already had Initial Annual Wellness).     Patient comes in for Medicare annual overall doing okay denies any complaints except for occasional c Medicine)    Patient Active Problem List:     Cerebral artery occlusion with cerebral infarction [facility])     Essential hypertension     Hyperlipidemia     Speech disturbance     Abnormality of gait as late effect of cerebrovascular accident (CVA)     Age-rel (cpt=19281); cyst aspiration right (Right); and colonoscopy. Her family history includes Diabetes in an other family member; Heart Disorder in her mother. SOCIAL HISTORY:   She  reports that she has never smoked.  She has never used smokeless tobacco. as at a meeting or place of Oriental orthodox: No   Many people I talk to seem to mumble (or don't speak clearly): No People get annoyed because I misunderstand what they say: No   I misunderstand what others are saying and make inappropriate responses: No I avoid s Oliver Montoya Free (65013) 10/18/2019, 09/12/2020   • FLUAD High Dose 65 yr and older (55616) 11/17/2017, 01/22/2019   • FLUZONE 6 months and older PFS 0.5 ml (10002) 10/14/2014   • Fluzone Vaccine Medicare () 11/11/2016   • HIGH DOSE FLU 65 YRS T4; Future  -     URINALYSIS, ROUTINE; Future  -     MICROALB/CREAT RATIO, RANDOM URINE;  Future    Abnormality of gait as late effect of cerebrovascular accident (CVA)- stable , uses cane to help with balance   -     CBC WITH DIFFERENTIAL WITH PLATELET; Fu ROUTINE; Future  -     MICROALB/CREAT RATIO, RANDOM URINE; Future        Right sided weakness- stable, post cva   -     CBC WITH DIFFERENTIAL WITH PLATELET; Future  -     COMP METABOLIC PANEL (14); Future  -     LIPID PANEL;  Future  -     TSH W REFLEX TO F (FSB)Annually Glucose (mg/dL)   Date Value   06/12/2020 117 (H)          Cardiovascular Disease Screening     LDL Annually LDL Cholesterol (mg/dL)   Date Value   12/19/2020 37        EKG - w/ Initial Preventative Physical Exam only, or if medically necessa the mentally retarded   Persons who live in the same house as a HepB virus carrier   Homosexual men   Illicit injectable drug abusers     Tetanus Toxoid  Only covered with a cut with metal- TD and TDaP Not covered by Medicare Part B) No vaccine history fou

## 2021-04-01 ENCOUNTER — OFFICE VISIT (OUTPATIENT)
Dept: INTERNAL MEDICINE CLINIC | Facility: CLINIC | Age: 71
End: 2021-04-01
Payer: MEDICARE

## 2021-04-01 VITALS
HEART RATE: 76 BPM | OXYGEN SATURATION: 99 % | DIASTOLIC BLOOD PRESSURE: 66 MMHG | RESPIRATION RATE: 17 BRPM | BODY MASS INDEX: 26.58 KG/M2 | WEIGHT: 150 LBS | SYSTOLIC BLOOD PRESSURE: 132 MMHG | TEMPERATURE: 98 F | HEIGHT: 63 IN

## 2021-04-01 DIAGNOSIS — M17.11 PRIMARY OSTEOARTHRITIS OF RIGHT KNEE: Primary | ICD-10-CM

## 2021-04-01 PROCEDURE — 3078F DIAST BP <80 MM HG: CPT | Performed by: INTERNAL MEDICINE

## 2021-04-01 PROCEDURE — 3008F BODY MASS INDEX DOCD: CPT | Performed by: INTERNAL MEDICINE

## 2021-04-01 PROCEDURE — 3075F SYST BP GE 130 - 139MM HG: CPT | Performed by: INTERNAL MEDICINE

## 2021-04-01 PROCEDURE — 99213 OFFICE O/P EST LOW 20 MIN: CPT | Performed by: INTERNAL MEDICINE

## 2021-04-01 NOTE — PROGRESS NOTES
HPI/Subjective:   Patient ID: Mayito Whitley is a 79year old female. HPI  She comes in today for right knee corticosteroids injection. We have been doing this every 3 months and has been helping.   History/Other:   Review of Systems   Constitutional Right Ear: External ear normal.      Left Ear: External ear normal.      Nose: Nose normal.   Eyes:      Conjunctiva/sclera: Conjunctivae normal.      Pupils: Pupils are equal, round, and reactive to light.    Cardiovascular:      Rate and Rhythm: Normal ra

## 2021-05-22 ENCOUNTER — HOSPITAL ENCOUNTER (OUTPATIENT)
Dept: BONE DENSITY | Age: 71
Discharge: HOME | End: 2021-05-22
Attending: INTERNAL MEDICINE
Payer: MEDICARE

## 2021-05-22 DIAGNOSIS — Z78.0 POSTMENOPAUSAL: ICD-10-CM

## 2021-05-22 PROCEDURE — 77080 DXA BONE DENSITY AXIAL: CPT | Performed by: INTERNAL MEDICINE

## 2021-06-07 RX ORDER — ATORVASTATIN CALCIUM 20 MG/1
TABLET, FILM COATED ORAL
Qty: 90 TABLET | Refills: 0 | Status: SHIPPED | OUTPATIENT
Start: 2021-06-07 | End: 2021-08-30

## 2021-06-07 RX ORDER — LISINOPRIL AND HYDROCHLOROTHIAZIDE 25; 20 MG/1; MG/1
TABLET ORAL
Qty: 90 TABLET | Refills: 1 | Status: SHIPPED | OUTPATIENT
Start: 2021-06-07 | End: 2021-11-30

## 2021-06-07 RX ORDER — METOPROLOL SUCCINATE 25 MG/1
25 TABLET, EXTENDED RELEASE ORAL DAILY
Qty: 10 TABLET | Refills: 0 | Status: SHIPPED | OUTPATIENT
Start: 2021-06-07 | End: 2021-08-30

## 2021-06-07 RX ORDER — METOPROLOL SUCCINATE 25 MG/1
25 TABLET, EXTENDED RELEASE ORAL DAILY
Qty: 90 TABLET | Refills: 0 | Status: SHIPPED | OUTPATIENT
Start: 2021-06-07 | End: 2021-06-07

## 2021-06-07 RX ORDER — CLOPIDOGREL BISULFATE 75 MG/1
TABLET ORAL
Qty: 90 TABLET | Refills: 1 | Status: SHIPPED | OUTPATIENT
Start: 2021-06-07 | End: 2021-11-30

## 2021-06-07 NOTE — TELEPHONE ENCOUNTER
Patient is requesting a partial refill of 10 pills of Metoprolol Succinate to be filled at her local pharmacy while she awaits the prescriptions to be sent from her [address].     Medication pended for your approval.          Patient requested appoint

## 2021-06-24 NOTE — ADDENDUM NOTE
Addended by: Cyntha Leventhal on: 6/24/2021 05:21 PM     Modules accepted: Level of Service, SmartSet

## 2021-06-24 NOTE — PATIENT INSTRUCTIONS
Bakari August's SCREENING SCHEDULE   Tests on this list are recommended by your physician but may not be covered, or covered at this frequency, by your insurer. Please check with your insurance carrier before scheduling to verify coverage.    PREVEN (CPT=77080) 05/22/2021      No recommendations at this time   Pap and Pelvic    Pap   Covered every 2 years for women at normal risk;  Annually if at high risk -  No recommendations at this time    Chlamydia Annually if high risk -  No recommendations at th Creatinine   Annually Lab Results   Component Value Date    CREATSERUM 0.99 03/13/2021         BUN Annually Lab Results   Component Value Date    BUN 14 03/13/2021       Drug Serum Conc Annually No results found for: DIGOXIN, DIG, VALP              Recomme

## 2021-07-17 ENCOUNTER — OFFICE VISIT (OUTPATIENT)
Dept: INTERNAL MEDICINE CLINIC | Facility: CLINIC | Age: 71
End: 2021-07-17
Payer: MEDICARE

## 2021-07-17 VITALS
BODY MASS INDEX: 26.22 KG/M2 | OXYGEN SATURATION: 99 % | HEART RATE: 70 BPM | RESPIRATION RATE: 16 BRPM | SYSTOLIC BLOOD PRESSURE: 124 MMHG | DIASTOLIC BLOOD PRESSURE: 60 MMHG | WEIGHT: 148 LBS | TEMPERATURE: 98 F | HEIGHT: 63 IN

## 2021-07-17 DIAGNOSIS — Z12.31 SCREENING MAMMOGRAM, ENCOUNTER FOR: ICD-10-CM

## 2021-07-17 DIAGNOSIS — M79.645 CHRONIC PAIN OF LEFT THUMB: Primary | ICD-10-CM

## 2021-07-17 DIAGNOSIS — R39.15 URINARY URGENCY: ICD-10-CM

## 2021-07-17 DIAGNOSIS — E11.42 TYPE 2 DIABETES MELLITUS WITH DIABETIC POLYNEUROPATHY, WITHOUT LONG-TERM CURRENT USE OF INSULIN (HCC): ICD-10-CM

## 2021-07-17 DIAGNOSIS — G89.29 CHRONIC PAIN OF LEFT THUMB: Primary | ICD-10-CM

## 2021-07-17 DIAGNOSIS — E78.5 HYPERLIPIDEMIA, UNSPECIFIED HYPERLIPIDEMIA TYPE: ICD-10-CM

## 2021-07-17 LAB
ALBUMIN SERPL-MCNC: 4.2 G/DL (ref 3.4–5)
ALBUMIN/GLOB SERPL: 1.1 {RATIO} (ref 1–2)
ALP LIVER SERPL-CCNC: 50 U/L
ALT SERPL-CCNC: 36 U/L
ANION GAP SERPL CALC-SCNC: 5 MMOL/L (ref 0–18)
AST SERPL-CCNC: 23 U/L (ref 15–37)
BILIRUB SERPL-MCNC: 0.6 MG/DL (ref 0.1–2)
BILIRUB UR QL: NEGATIVE
BUN BLD-MCNC: 19 MG/DL (ref 7–18)
BUN/CREAT SERPL: 21.1 (ref 10–20)
CALCIUM BLD-MCNC: 10 MG/DL (ref 8.5–10.1)
CHLORIDE SERPL-SCNC: 104 MMOL/L (ref 98–112)
CLARITY UR: CLEAR
CO2 SERPL-SCNC: 29 MMOL/L (ref 21–32)
COLOR UR: YELLOW
CREAT BLD-MCNC: 0.9 MG/DL
EST. AVERAGE GLUCOSE BLD GHB EST-MCNC: 154 MG/DL (ref 68–126)
GLOBULIN PLAS-MCNC: 3.8 G/DL (ref 2.8–4.4)
GLUCOSE BLD-MCNC: 124 MG/DL (ref 70–99)
GLUCOSE UR-MCNC: NEGATIVE MG/DL
HBA1C MFR BLD HPLC: 7 % (ref ?–5.7)
HGB UR QL STRIP.AUTO: NEGATIVE
KETONES UR-MCNC: NEGATIVE MG/DL
LEUKOCYTE ESTERASE UR QL STRIP.AUTO: NEGATIVE
M PROTEIN MFR SERPL ELPH: 8 G/DL (ref 6.4–8.2)
NITRITE UR QL STRIP.AUTO: NEGATIVE
OSMOLALITY SERPL CALC.SUM OF ELEC: 290 MOSM/KG (ref 275–295)
PATIENT FASTING Y/N/NP: YES
PH UR: 5 [PH] (ref 5–8)
POTASSIUM SERPL-SCNC: 4.2 MMOL/L (ref 3.5–5.1)
PROT UR-MCNC: NEGATIVE MG/DL
SODIUM SERPL-SCNC: 138 MMOL/L (ref 136–145)
SP GR UR STRIP: 1.02 (ref 1–1.03)
UROBILINOGEN UR STRIP-ACNC: <2

## 2021-07-17 PROCEDURE — 3051F HG A1C>EQUAL 7.0%<8.0%: CPT | Performed by: INTERNAL MEDICINE

## 2021-07-17 PROCEDURE — 99214 OFFICE O/P EST MOD 30 MIN: CPT | Performed by: INTERNAL MEDICINE

## 2021-07-17 PROCEDURE — 3078F DIAST BP <80 MM HG: CPT | Performed by: INTERNAL MEDICINE

## 2021-07-17 PROCEDURE — 3008F BODY MASS INDEX DOCD: CPT | Performed by: INTERNAL MEDICINE

## 2021-07-17 PROCEDURE — 3074F SYST BP LT 130 MM HG: CPT | Performed by: INTERNAL MEDICINE

## 2021-07-17 PROCEDURE — 36415 COLL VENOUS BLD VENIPUNCTURE: CPT | Performed by: INTERNAL MEDICINE

## 2021-07-17 NOTE — PROGRESS NOTES
HPI/Subjective:     Patient ID: Jimmy Baez is a 70year old female. HPI  Patient comes in today for follow-up she is complaining of left thumb pain as per patient is chronic but lately is gotten worse.   No falls no injury that she remembers patie • aspirin 81 MG Oral Tab EC TAKE 1 TABLET BY MOUTH EVERY DAY 30 tablet 2   • melatonin 3 MG Oral Tab Take one tab at night 30 min before going to bed 30 tablet 0   • PEG 3350-KCl-Na Bicarb-NaCl (GAVILYTE-N WITH FLAVOR PACK) 420 g Oral Recon Soln Take 4,0 Pulmonary:      Effort: Pulmonary effort is normal.      Breath sounds: Normal breath sounds. Abdominal:      General: Bowel sounds are normal.      Palpations: Abdomen is soft.    Genitourinary:     Vagina: Normal.   Musculoskeletal:         General: N

## 2021-08-04 ENCOUNTER — OFFICE VISIT (OUTPATIENT)
Dept: RHEUMATOLOGY | Facility: CLINIC | Age: 71
End: 2021-08-04
Payer: MEDICARE

## 2021-08-04 VITALS
SYSTOLIC BLOOD PRESSURE: 150 MMHG | HEIGHT: 63 IN | BODY MASS INDEX: 26.4 KG/M2 | HEART RATE: 62 BPM | DIASTOLIC BLOOD PRESSURE: 79 MMHG | WEIGHT: 149 LBS

## 2021-08-04 DIAGNOSIS — M17.11 PRIMARY OSTEOARTHRITIS OF RIGHT KNEE: ICD-10-CM

## 2021-08-04 DIAGNOSIS — M79.645 CHRONIC PAIN OF LEFT THUMB: Primary | ICD-10-CM

## 2021-08-04 DIAGNOSIS — G89.29 CHRONIC PAIN OF LEFT THUMB: Primary | ICD-10-CM

## 2021-08-04 PROCEDURE — 99213 OFFICE O/P EST LOW 20 MIN: CPT | Performed by: INTERNAL MEDICINE

## 2021-08-04 PROCEDURE — 3077F SYST BP >= 140 MM HG: CPT | Performed by: INTERNAL MEDICINE

## 2021-08-04 PROCEDURE — 3078F DIAST BP <80 MM HG: CPT | Performed by: INTERNAL MEDICINE

## 2021-08-04 PROCEDURE — 3008F BODY MASS INDEX DOCD: CPT | Performed by: INTERNAL MEDICINE

## 2021-08-04 NOTE — PATIENT INSTRUCTIONS
You are seen today for pain in your left thumb due to a bone spur from osteoarthritis  You can try the Voltaren gel to your thumb  Can try taking Tylenol arthritis 650 mg once or twice a day  Your right knee is also due to osteoarthritis  We will try to ge

## 2021-08-04 NOTE — PROGRESS NOTES
Sandhya Corrigan is a 70year old female who presents for No chief complaint on file. [place] HPI:   CC: L thumb pain  Consult: referred by PCP Dr. Ever Montoya    This is a 69 yo F with hx of HTN, HLD, DM-2, CVA (2014) presents with left thumb pain.   She has had l OR) Take by mouth.      • ONETOUCH DELICA LANCETS 61E Does not apply Misc use as directed twice daily 100 each 6   • aspirin 81 MG Oral Tab EC TAKE 1 TABLET BY MOUTH EVERY DAY 30 tablet 2   • melatonin 3 MG Oral Tab Take one tab at night 30 min before going Hx, no hx of OCP,   Neuro: No numbness or tingling, no headache, no hx of seizures,   Psych: no hx of anxiety or depression  ENDO: no hx of thyroid disease, no hx of DM  Joint/Muscluskeltal: see HPI,   All other ROS are negative.      EXAM:   There were no 145 mmol/L 138    Potassium      3.5 - 5.1 mmol/L 4.2    Chloride      98 - 112 mmol/L 104    Carbon Dioxide, Total      21.0 - 32.0 mmol/L 29.0    ANION GAP      0 - 18 mmol/L 5    BUN      7 - 18 mg/dL 19 (H)    CREATININE      0.55 - 1.02 mg/dL 0.90

## 2021-08-08 ENCOUNTER — HOSPITAL ENCOUNTER (OUTPATIENT)
Dept: ULTRASOUND IMAGING | Age: 71
Discharge: HOME | End: 2021-08-08
Attending: INTERNAL MEDICINE
Payer: MEDICARE

## 2021-08-08 ENCOUNTER — HOSPITAL ENCOUNTER (OUTPATIENT)
Age: 71
End: 2021-08-08
Payer: MEDICARE

## 2021-08-08 VITALS
BODY MASS INDEX: 26.4 KG/M2 | RESPIRATION RATE: 20 BRPM | TEMPERATURE: 98 F | WEIGHT: 149 LBS | DIASTOLIC BLOOD PRESSURE: 73 MMHG | OXYGEN SATURATION: 98 % | SYSTOLIC BLOOD PRESSURE: 166 MMHG | HEART RATE: 81 BPM | HEIGHT: 63 IN

## 2021-08-08 DIAGNOSIS — R39.15 URINARY URGENCY: ICD-10-CM

## 2021-08-08 PROCEDURE — 76857 US EXAM PELVIC LIMITED: CPT | Performed by: INTERNAL MEDICINE

## 2021-08-08 PROCEDURE — 81002 URINALYSIS NONAUTO W/O SCOPE: CPT | Performed by: EMERGENCY MEDICINE

## 2021-08-08 NOTE — ED INITIAL ASSESSMENT (HPI)
Patient reports she has having frequency in urination. Patient reports she is also having pain to the groin area. Patient reports she has been having urinary frequency for the last month.

## 2021-08-08 NOTE — ED QUICK NOTES
Patient was arrived by  to be seen in the [address] [place] [address] but when Dr. Curly Villegas went into the room to evaluate the patient, she told him she was here for a scheduled test/appointment for 3:30pm. Patient was actually here for an [address] [place] [address].  Flory Ho in [address] [facility] was co

## 2021-08-09 ENCOUNTER — TELEPHONE (OUTPATIENT)
Dept: INTERNAL MEDICINE CLINIC | Facility: CLINIC | Age: 71
End: 2021-08-09

## 2021-08-09 NOTE — TELEPHONE ENCOUNTER
Harrya ([place]) daughter(on hipaa) with patient in the background indicated that patient still having pain and urinary urgency. Also since yesterday also having headaches, low back pain radiating down both legs. No injuries.  Has numbness/tingling down the legs, but

## 2021-08-30 RX ORDER — ATORVASTATIN CALCIUM 20 MG/1
20 TABLET, FILM COATED ORAL DAILY
Qty: 90 TABLET | Refills: 1 | Status: SHIPPED | OUTPATIENT
Start: 2021-08-30 | End: 2022-05-09

## 2021-08-30 RX ORDER — METOPROLOL SUCCINATE 25 MG/1
25 TABLET, EXTENDED RELEASE ORAL DAILY
Qty: 90 TABLET | Refills: 1 | Status: SHIPPED | OUTPATIENT
Start: 2021-08-30 | End: 2022-03-07

## 2021-08-30 NOTE — TELEPHONE ENCOUNTER
Refill passed per [facility] protocol. Requested Prescriptions   Pending Prescriptions Disp Refills    METOPROLOL SUCCINATE 25 MG Oral Tablet 24 Hr [Pharmacy Med Name: METOPROLOL SUCCINATE ER 25 MG Tablet Extended Release 24 Hour] 90 tablet 0     Sig: Take 1 tablet (25 mg total) by mouth daily.         Hypertensive Medications Protocol Passed - 8/30/2021  5:21 PM        Passed - CMP or BMP in past 12 months        Passed - Appointment in past 6 or next 3 months        Passed - GFR  > 50     Lab Results   Component Value Date    GFRAA 74 07/17/2021                     Future Appointments         Provider Department Appt Notes    In 2 weeks Leonor Madrid MD [facility], [address] [facility]    In 1 month Aleja Smallwood MD [facility], [address] [facility] [address] 3 mth follow up    In 1 month Mirta Santamaria, [facility], [address], Ana garcia            Recent Outpatient Visits              3 weeks ago Chronic pain of left thumb    [facility] [place], [address], Martina Lagunas MD    Office Visit    1 month ago Chronic pain of left thumb    [facility], [address], Jm Cazares MD    Office Visit    5 months ago Primary osteoarthritis of right knee    [facility], [address], Chetan Pineda MD    Office Visit    5 months ago Eliecer Purcell annual wellness visit, subsequent    [facility], [address], Chetan Pineda MD    Office Visit    6 months ago Constipation, unspecified constipation type    [place], [employer]

## 2021-08-30 NOTE — TELEPHONE ENCOUNTER
Refill passed per [facility] [facility] protocol.     Requested Prescriptions   Pending Prescriptions Disp Refills    ATORVASTATIN 20 MG Oral Tab [Pharmacy Med Name: ATORVASTATIN CALCIUM 20 MG Tablet] 90 tablet 0     Sig: TAKE 1 TABLET EVERY DAY        Cholesterol Medication Protocol Passed - 8/30/2021  5:21 PM        Passed - ALT in past 12 months        Passed - LDL in past 12 months        Passed - Last ALT < 80       Lab Results   Component Value Date    ALT 36 07/17/2021             Passed - Last LDL < 130     Lab Results   Component Value Date    LDL 36 03/13/2021             Passed - Appointment in past 12 or next 3 months              Future Appointments         Provider Department Appt Notes    In 2 weeks Geoff Hitchcock MD [facility], [address] [facility], [address] [facility] Urinary urgency    In 1 month Cheryl Palacios MD [facility], [address] [facility] 3 mth follow up    In 1 month Bassam Santamariaian, [facility], [address] [place] [address],[address] Visits              3 weeks ago Chronic pain of left thumb    [facility] [place], [address], Caren Lagunas MD    Office Visit    1 month ago Chronic pain of left thumb    [facility], [address], Darren Cazares MD    Office Visit    5 months ago Primary osteoarthritis of right knee    [address] [facility], Farheen Dickerson MD    Office Visit    5 months ago Eliecer Purcell annual wellness visit, subsequent    [facility], [address], Farheen Dickerson MD    Office Visit    6 months ago Constipation, unspecified constipation type    [facility], [facility], [address] [facility], Cesilia Jasmine [employer]

## 2021-09-07 NOTE — TELEPHONE ENCOUNTER
New Rx request: True Metrix blood glucose Sulema centeno True Metrix test strip, True plus 33g lancets

## 2021-09-08 RX ORDER — CALCIUM CITRATE/VITAMIN D3 200MG-6.25
1 TABLET ORAL ONCE
Qty: 100 STRIP | Refills: 1 | Status: SHIPPED | OUTPATIENT
Start: 2021-09-08 | End: 2021-09-08

## 2021-09-08 RX ORDER — GLUCOSAM/CHON-MSM1/C/MANG/BOSW 500-416.6
TABLET ORAL
Qty: 100 EACH | Refills: 1 | Status: SHIPPED | OUTPATIENT
Start: 2021-09-08

## 2021-09-08 RX ORDER — BLOOD-GLUCOSE METER
1 EACH MISCELLANEOUS ONCE
Qty: 1 EACH | Refills: 0 | Status: SHIPPED | OUTPATIENT
Start: 2021-09-08 | End: 2021-09-08

## 2021-09-08 NOTE — TELEPHONE ENCOUNTER
Refill passed per [facility] protocol. Requested Prescriptions   Pending Prescriptions Disp Refills    Glucose Blood (TRUE METRIX BLOOD GLUCOSE TEST) In Vitro Strip 100 strip 3     Si each by In Vitro route one time for 1 dose. Diabetic Supplies Protocol Passed - 2021  3:40 PM        Passed - Appointment in past 12 or next 3 months           Blood Glucose Monitoring Suppl (TRUE METRIX METER) Does not apply Device 1 each 0     Si each by Does not apply route one time for 1 dose.         Diabetic Supplies Protocol Passed - 2021  3:40 PM        Passed - Appointment in past 12 or next 3 months           TRUEplus Lancets 33G Does not apply Misc 100 each 3     Sig: Use to check glucose once daily        Diabetic Supplies Protocol Passed - 2021  3:40 PM        Passed - Appointment in past 12 or next 3 months              Recent Outpatient Visits              1 month ago Chronic pain of left thumb    [facility], [address], Jeremias Sanchez MD    Office Visit    1 month ago Chronic pain of left thumb    [facility], [address], [place], Juni Gardner MD    Office Visit    5 months ago Primary osteoarthritis of right knee    [address] [facility], [place], Juni Gardner MD    Office Visit    5 months ago Eliecer Purcell annual wellness visit, subsequent    [place] [employer], [facility], [address], Veronica Mosquera MD    Office Visit    6 months ago Constipation, unspecified constipation type    [facility] Kenroy Huber Lorelie Morelle, APRN    Office Visit            Future Appointments         Provider Department Appt Notes    In 5 days Yi Mckeon, [address] [facility], [address], [facility] Urinary urgency    In 1 month Brendon Yan MD [facility], [address] [facility] [address] 3 mth follow up    In 1 month Sridevi Santamaria, [facility], [address], [facility]

## 2021-09-13 ENCOUNTER — OFFICE VISIT (OUTPATIENT)
Dept: SURGERY | Facility: CLINIC | Age: 71
End: 2021-09-13
Payer: MEDICARE

## 2021-09-13 DIAGNOSIS — R39.13 INTERMITTENT URINARY STREAM: ICD-10-CM

## 2021-09-13 DIAGNOSIS — R35.0 URINARY FREQUENCY: ICD-10-CM

## 2021-09-13 DIAGNOSIS — R35.1 NOCTURIA: ICD-10-CM

## 2021-09-13 DIAGNOSIS — R82.90 URINE FINDING: Primary | ICD-10-CM

## 2021-09-13 LAB
APPEARANCE: CLEAR
BILIRUBIN: NEGATIVE
GLUCOSE (URINE DIPSTICK): NEGATIVE MG/DL
LEUKOCYTES: NEGATIVE
MULTISTIX LOT#: NORMAL NUMERIC
NITRITE, URINE: NEGATIVE
OCCULT BLOOD: NEGATIVE
PH, URINE: 5 (ref 4.5–8)
PROTEIN (URINE DIPSTICK): NEGATIVE MG/DL
SPECIFIC GRAVITY: 1.02 (ref 1–1.03)
URINE-COLOR: YELLOW
UROBILINOGEN,SEMI-QN: 0.2 MG/DL (ref 0–1.9)

## 2021-09-13 PROCEDURE — 81003 URINALYSIS AUTO W/O SCOPE: CPT | Performed by: UROLOGY

## 2021-09-13 PROCEDURE — 99213 OFFICE O/P EST LOW 20 MIN: CPT | Performed by: UROLOGY

## 2021-09-13 RX ORDER — DOXYCYCLINE 100 MG/1
100 TABLET ORAL 2 TIMES DAILY
Qty: 28 TABLET | Refills: 1 | Status: SHIPPED | OUTPATIENT
Start: 2021-09-13 | End: 2021-09-27

## 2021-09-13 NOTE — PROGRESS NOTES
Rooming Clinician:     Fred Alicia is a 70year old female. Patient presents with:  Consult: referred by pcp.   Nocturia:  Frequency: q 2 hours hour  Stream: Medium  Nocturia: q 2 hours times  Straining to urinate: No    Empty after urination: Yes HCl (VITAMIN B-6 OR) Take by mouth. • CALCIUM OR Take by mouth. • Cholecalciferol (VITAMIN D-3 OR) Take by mouth.      • ONETOUCH DELICA LANCETS 52C Does not apply Misc use as directed twice daily 100 each 6   • aspirin 81 MG Oral Tab EC TAKE 1 TABL tenderness  NEURO: grossly normal  EXTREMITIES: no cyanosis, clubbing or edema    LAB:     Lab Results   Component Value Date    WBC 4.4 03/13/2021    HGB 11.5 (L) 03/13/2021    HCT 37.4 03/13/2021    .0 03/13/2021    CREATSERUM 0.90 07/17/2021    B

## 2021-09-14 NOTE — PROGRESS NOTES
Your recent urine cytology shows no cancer cells and is normal.  Recommend follow up in the office as directed.     Sincerely,  Krishna Phillips MD

## 2021-10-08 ENCOUNTER — TELEPHONE (OUTPATIENT)
Dept: INTERNAL MEDICINE CLINIC | Facility: CLINIC | Age: 71
End: 2021-10-08

## 2021-10-08 NOTE — TELEPHONE ENCOUNTER
Per patient she us returning a call, informed that this nurse did not find  any note that we called her.

## 2021-10-11 ENCOUNTER — OFFICE VISIT (OUTPATIENT)
Dept: SURGERY | Facility: CLINIC | Age: 71
End: 2021-10-11
Payer: MEDICARE

## 2021-10-11 DIAGNOSIS — Z00.00 NORMAL EXAM: ICD-10-CM

## 2021-10-11 DIAGNOSIS — R82.90 URINE FINDING: Primary | ICD-10-CM

## 2021-10-11 PROCEDURE — 81003 URINALYSIS AUTO W/O SCOPE: CPT | Performed by: UROLOGY

## 2021-10-11 PROCEDURE — 99213 OFFICE O/P EST LOW 20 MIN: CPT | Performed by: UROLOGY

## 2021-10-11 NOTE — PROGRESS NOTES
Rooming Clinician:     Torres Bergmananca is a 70year old female. Patient presents with: Follow - Up: urinary frequency q 2 hours.  took doxycycline for 1 week only        HPI:     Patient brought her voiding diary which did not have a complete input bu melatonin 3 MG Oral Tab Take one tab at night 30 min before going to bed 30 tablet 0     No Known Allergies   Past Medical History:   Diagnosis Date   • Ametropia     Hyperopia   • Diabetes ([facility] [address]    • Lipid screening 5/29/2014   • Numbness and tingling of 124 (H) 07/17/2021    CA 10.0 07/17/2021    ALB 4.2 07/17/2021    ALKPHO 50 (L) 07/17/2021    AST 23 07/17/2021    ALT 36 07/17/2021       X-RAY[de-identified]         ASSESSMENT:     Normal voiding diary    PLAN:     Follow-up with urology as needed.     Diagnoses and

## 2021-10-16 ENCOUNTER — OFFICE VISIT (OUTPATIENT)
Dept: INTERNAL MEDICINE CLINIC | Facility: CLINIC | Age: 71
End: 2021-10-16
Payer: MEDICARE

## 2021-10-16 VITALS
RESPIRATION RATE: 17 BRPM | BODY MASS INDEX: 26.93 KG/M2 | TEMPERATURE: 98 F | HEART RATE: 76 BPM | WEIGHT: 152 LBS | DIASTOLIC BLOOD PRESSURE: 82 MMHG | OXYGEN SATURATION: 99 % | SYSTOLIC BLOOD PRESSURE: 128 MMHG | HEIGHT: 63 IN

## 2021-10-16 DIAGNOSIS — E11.42 TYPE 2 DIABETES MELLITUS WITH DIABETIC POLYNEUROPATHY, WITHOUT LONG-TERM CURRENT USE OF INSULIN (HCC): Primary | ICD-10-CM

## 2021-10-16 DIAGNOSIS — I10 ESSENTIAL HYPERTENSION: ICD-10-CM

## 2021-10-16 DIAGNOSIS — E78.5 HYPERLIPIDEMIA, UNSPECIFIED HYPERLIPIDEMIA TYPE: ICD-10-CM

## 2021-10-16 DIAGNOSIS — I50.32 CHRONIC DIASTOLIC CONGESTIVE HEART FAILURE, NYHA CLASS 1 (HCC): ICD-10-CM

## 2021-10-16 DIAGNOSIS — I65.23 CAROTID ARTERY PLAQUE, BILATERAL: ICD-10-CM

## 2021-10-16 PROCEDURE — 3074F SYST BP LT 130 MM HG: CPT | Performed by: INTERNAL MEDICINE

## 2021-10-16 PROCEDURE — 3079F DIAST BP 80-89 MM HG: CPT | Performed by: INTERNAL MEDICINE

## 2021-10-16 PROCEDURE — 90662 IIV NO PRSV INCREASED AG IM: CPT | Performed by: INTERNAL MEDICINE

## 2021-10-16 PROCEDURE — 36415 COLL VENOUS BLD VENIPUNCTURE: CPT | Performed by: INTERNAL MEDICINE

## 2021-10-16 PROCEDURE — 3008F BODY MASS INDEX DOCD: CPT | Performed by: INTERNAL MEDICINE

## 2021-10-16 PROCEDURE — 99214 OFFICE O/P EST MOD 30 MIN: CPT | Performed by: INTERNAL MEDICINE

## 2021-10-16 PROCEDURE — G0008 ADMIN INFLUENZA VIRUS VAC: HCPCS | Performed by: INTERNAL MEDICINE

## 2021-10-16 PROCEDURE — 3051F HG A1C>EQUAL 7.0%<8.0%: CPT | Performed by: INTERNAL MEDICINE

## 2021-10-16 NOTE — PROGRESS NOTES
Subjective:     Patient ID: Jimmy Baez is a 70year old female.     HPI  Patient comes in for 3-month follow-up overall doing okay except for the knee pain she will be coming next week for an injection  History/Other:   Review of Systems   Constituti Diagnosis Date   • Ametropia     Hyperopia   • Diabetes ([facility] [address]    • Lipid screening 5/29/2014   • Numbness and tingling of leg    • Other and unspecified hyperlipidemia    • Stroke [facility]) 1/2014   • Type II or unspecified type diabetes mellitus without men General: Skin is warm and dry. Neurological:      Mental Status: She is alert and oriented to person, place, and time. Mental status is at baseline. Gait: Gait abnormal.      Deep Tendon Reflexes: Reflexes are normal and symmetric.       Comments:

## 2021-10-18 ENCOUNTER — HOSPITAL ENCOUNTER (OUTPATIENT)
Dept: MAMMOGRAPHY | Age: 71
Discharge: HOME | End: 2021-10-18
Attending: INTERNAL MEDICINE
Payer: MEDICARE

## 2021-10-18 DIAGNOSIS — Z12.31 ENCOUNTER FOR SCREENING MAMMOGRAM FOR MALIGNANT NEOPLASM OF BREAST: ICD-10-CM

## 2021-10-18 PROCEDURE — 77063 BREAST TOMOSYNTHESIS BI: CPT | Performed by: INTERNAL MEDICINE

## 2021-10-18 PROCEDURE — 77067 SCR MAMMO BI INCL CAD: CPT | Performed by: INTERNAL MEDICINE

## 2021-10-19 ENCOUNTER — OFFICE VISIT (OUTPATIENT)
Dept: INTERNAL MEDICINE CLINIC | Facility: CLINIC | Age: 71
End: 2021-10-19
Payer: MEDICARE

## 2021-10-19 VITALS
BODY MASS INDEX: 26.93 KG/M2 | HEART RATE: 87 BPM | OXYGEN SATURATION: 99 % | TEMPERATURE: 98 F | DIASTOLIC BLOOD PRESSURE: 76 MMHG | HEIGHT: 63 IN | WEIGHT: 152 LBS | RESPIRATION RATE: 18 BRPM | SYSTOLIC BLOOD PRESSURE: 136 MMHG

## 2021-10-19 DIAGNOSIS — M17.11 PRIMARY OSTEOARTHRITIS OF RIGHT KNEE: Primary | ICD-10-CM

## 2021-10-19 PROCEDURE — 3075F SYST BP GE 130 - 139MM HG: CPT | Performed by: INTERNAL MEDICINE

## 2021-10-19 PROCEDURE — 99214 OFFICE O/P EST MOD 30 MIN: CPT | Performed by: INTERNAL MEDICINE

## 2021-10-19 PROCEDURE — 3078F DIAST BP <80 MM HG: CPT | Performed by: INTERNAL MEDICINE

## 2021-10-19 PROCEDURE — 3008F BODY MASS INDEX DOCD: CPT | Performed by: INTERNAL MEDICINE

## 2021-10-19 NOTE — PROGRESS NOTES
Subjective:   Patient ID: Ovidio Molina is a 70year old female. HPI  Patient comes in today for steroid injection on the right knee due to osteoarthritis is getting 1 every 3 months it helps.     History/Other:   Review of Systems   Constitutional: Physical Exam  Vitals and nursing note reviewed. Constitutional:       Appearance: She is well-developed. HENT:      Head: Normocephalic and atraumatic.       Right Ear: External ear normal.      Left Ear: External ear normal.      Nose: Nose normal.

## 2021-10-20 ENCOUNTER — OFFICE VISIT (OUTPATIENT)
Dept: PODIATRY CLINIC | Facility: CLINIC | Age: 71
End: 2021-10-20
Payer: MEDICARE

## 2021-10-20 DIAGNOSIS — E11.9 TYPE 2 DIABETES MELLITUS WITHOUT COMPLICATION, WITH LONG-TERM CURRENT USE OF INSULIN (HCC): Primary | ICD-10-CM

## 2021-10-20 DIAGNOSIS — Z79.4 TYPE 2 DIABETES MELLITUS WITHOUT COMPLICATION, WITH LONG-TERM CURRENT USE OF INSULIN (HCC): Primary | ICD-10-CM

## 2021-10-20 PROCEDURE — 99213 OFFICE O/P EST LOW 20 MIN: CPT | Performed by: PODIATRIST

## 2021-10-20 NOTE — PROGRESS NOTES
HPI:    Patient ID: Suraj Wang is a 70year old female. 26-year-old female presents for a yearly diabetic foot evaluation. She saw Becka Steve on October 19 of this year. Her most recent A1c was 7.0 and her fasting blood sugar today was 116.   On spe EVERY DAY 30 tablet 2   • melatonin 3 MG Oral Tab Take one tab at night 30 min before going to bed 30 tablet 0     Allergies:No Known Allergies   PHYSICAL EXAM:     Pulses are normal.  There is no evidence of edema nor erythema.   There is some dryness hair

## 2021-11-11 ENCOUNTER — HOSPITAL ENCOUNTER (OUTPATIENT)
Dept: ULTRASOUND IMAGING | Age: 71
Discharge: HOME | End: 2021-11-11
Attending: INTERNAL MEDICINE
Payer: MEDICARE

## 2021-11-11 DIAGNOSIS — I65.23 CAROTID ARTERY PLAQUE, BILATERAL: ICD-10-CM

## 2021-11-11 PROCEDURE — 93880 EXTRACRANIAL BILAT STUDY: CPT | Performed by: INTERNAL MEDICINE

## 2021-11-26 NOTE — MR AVS SNAPSHOT
After Visit Summary   3/31/2023    Lanny Hameed Braddock   MRN: [id number]           Visit Information     Date & Time  3/31/2023  3:00 PM Provider  Kathy Harrington MD Department  [address], [facility],[address], [employer]. Phone  [phone]      Your Vitals Were  Most recent update: 3/31/2023  3:56 PM    BP   160/85    Pulse   75            Allergies as of 3/31/2023  Review status set to Review Complete on 3/31/2023   No Known Allergies     Your Current Medications        Dosage    lidocaine 5 % External Patch Place 1 patch onto the skin Q24H PRN. naproxen 500 MG Oral Tab Take 1 tablet (500 mg total) by mouth 2 (two) times daily as needed. atorvastatin 20 MG Oral Tab Take 1 tablet (20 mg total) by mouth daily. LISINOPRIL-HYDROCHLOROTHIAZIDE 20-25 MG Oral Tab TAKE 1 TABLET EVERY DAY    CLOPIDOGREL 75 MG Oral Tab TAKE 1 TABLET EVERY DAY    Acetaminophen-Codeine (TYLENOL WITH CODEINE #3) 300-30 MG Oral Tab Take 1 tablet by mouth every 12 (twelve) hours as needed for Pain. Blood Glucose Monitoring Suppl (TRUE METRIX METER) w/Device Does not apply Kit USE AS DIRECTED    amLODIPine 2.5 MG Oral Tab Take 1 tablet (2.5 mg total) by mouth daily. METFORMIN 850 MG Oral Tab TAKE 1 TABLET THREE TIMES DAILY    METOPROLOL SUCCINATE ER 25 MG Oral Tablet 24 Hr TAKE 1 TABLET EVERY DAY    TRUEPLUS LANCETS 33G Does not apply Misc USE TO CHECK GLUCOSE ONCE DAILY    Glucose Blood (TRUE METRIX BLOOD GLUCOSE TEST) In Vitro Strip TEST BLOOD SUGAR EVERY DAY    Alcohol Swabs (BD SWAB SINGLE USE REGULAR) Does not apply Pads Uses 5 times a day    Diclofenac Sodium 1 % External Gel Apply 2 g topically 4 (four) times daily. SHINGRIX 50 MCG/0.5ML Intramuscular Recon Susp     Pyridoxine HCl (VITAMIN B-6 OR) Take by mouth. CALCIUM OR Take by mouth. Cholecalciferol (VITAMIN D-3 OR) Take by mouth.     Soila Mcdaniel LANCETS 33A Does not apply Misc use as directed twice daily    aspirin 81 MG Oral Tab EC TAKE 1 TABLET BY MOUTH EVERY DAY    melatonin 3 MG Oral Tab Take one tab at night 30 min before going to bed      Diagnoses for This Visit    Effusion, right knee   [[id number]]  -  Primary           Follow-up    Return for after ordered tests completed. We Ordered the Following     Normal Orders This Visit    BODY FLUID CULT,AEROBIC AND ANAEROBIC [5907204 CUSTOM]     C-REACTIVE PROTEIN [7670548 CUSTOM]     CELL COUNT SYNOVIAL [[id number] CUSTOM]     CELL CT/DIF & CRYSTAL SYNOVIAL [[id number] CUSTOM]     CELL CT/DIF SYNOVIAL [[id number] CUSTOM]     DRAIN/INJECT LARGE JOINT/BURSA [35261 CPT(R)]     RHEUMATOLOGY - INTERNAL [39130925 CUSTOM]     SED RATE, FRANCESREN (AUTOMATED) [[id number] CUSTOM]     Future Labs/Procedures Expected by Expires    BODY FLUID CULT,AEROBIC AND ANAEROBIC [[id number] CUSTOM]  3/31/2023 (Approximate) 3/31/2024    CELL CT/DIF & CRYSTAL SYNOVIAL [[id number] CUSTOM]  3/31/2023 3/31/2024    MRI KNEE, RIGHT (NCI=[id number]) [91678 CPT(R)]  3/31/2023 (Approximate) 3/31/2024      Future Appointments        Provider Department    5/13/2023 10:30 AM Alie Mathias [address], [address], [place]    11/30/2023 4:00 PM [facility], [address], [place]    1/16/2024 4:30 PM [facility]      Follow-up Instructions    Return for after ordered tests completed. Imaging Scheduling Instructions     Around March 31, 2023   Imaging:   MRI KNEE, RIGHT (NOY=[id number])    Instructions: Your order will generate a \"Scheduling Ticket\" that will be available in [employer] to schedule on your own at a time most convenient to you. To ensure you receive your test in a timely manner, STAT orders will not generate a ticket and must be scheduled by calling the Central Scheduling department. Your physician has ordered a radiology test that may require authorization from your insurance company.   Your physician or the clinic staff will work with your insurance company to obtain this authorization for your ordered radiology test.    If you do not have a [employer] Account, or if you prefer to speak with someone to schedule your appointment, please call Dk Burgess at [phone]. Did you know that [facility] primary care physicians now offer Video Visits through [address] for adult patients for a variety of conditions such as allergies, back pain and cold symptoms? Skip the drive and waiting room and online chat with a doctor face-to-face using your web-cam enabled computer or mobile device wherever you are. Video Visits cost $50 and can be paid hassle-free using a credit, debit, or health savings card. Not active on [employer]? Ask us how to get signed up today! If you receive a survey from [employer], please take a few minutes to complete it and provide feedback. We strive to deliver the best patient experience and are looking for ways to make improvements. Your feedback will help us do so. For more information on [employer], please visit [email or web address]           No text in SmartText           No text in SmartText Pfizer dose 1, 2, and 3

## 2021-11-30 ENCOUNTER — TELEPHONE (OUTPATIENT)
Dept: INTERNAL MEDICINE CLINIC | Facility: CLINIC | Age: 71
End: 2021-11-30

## 2021-11-30 RX ORDER — LISINOPRIL AND HYDROCHLOROTHIAZIDE 25; 20 MG/1; MG/1
TABLET ORAL
Qty: 90 TABLET | Refills: 1 | Status: SHIPPED | OUTPATIENT
Start: 2021-11-30

## 2021-11-30 RX ORDER — ISOPROPYL ALCOHOL 0.75 G/1
SWAB TOPICAL
Qty: 500 EACH | Refills: 3 | Status: SHIPPED | OUTPATIENT
Start: 2021-11-30

## 2021-11-30 RX ORDER — CLOPIDOGREL BISULFATE 75 MG/1
TABLET ORAL
Qty: 90 TABLET | Refills: 1 | Status: SHIPPED | OUTPATIENT
Start: 2021-11-30

## 2022-01-08 ENCOUNTER — OFFICE VISIT (OUTPATIENT)
Dept: OPHTHALMOLOGY | Facility: CLINIC | Age: 72
End: 2022-01-08
Payer: MEDICARE

## 2022-01-08 DIAGNOSIS — E11.9 DIABETES MELLITUS TYPE 2 WITHOUT RETINOPATHY (HCC): Primary | ICD-10-CM

## 2022-01-08 DIAGNOSIS — H43.393 FLOATER, VITREOUS, BILATERAL: ICD-10-CM

## 2022-01-08 DIAGNOSIS — H25.13 AGE-RELATED NUCLEAR CATARACT OF BOTH EYES: ICD-10-CM

## 2022-01-08 PROCEDURE — 2023F DILAT RTA XM W/O RTNOPTHY: CPT | Performed by: OPHTHALMOLOGY

## 2022-01-08 PROCEDURE — 92015 DETERMINE REFRACTIVE STATE: CPT | Performed by: OPHTHALMOLOGY

## 2022-01-08 PROCEDURE — 92014 COMPRE OPH EXAM EST PT 1/>: CPT | Performed by: OPHTHALMOLOGY

## 2022-01-08 NOTE — PROGRESS NOTES
Christen Villarreal is a 70year old female.     HPI:     HPI     Diabetic Eye Exam     Comments: Pt has been a diabetic for 7 years       Pt's diabetes is currently controlled by pills  Pt checks BS 2x a day   Pt's last blood sugar was 113 yesterday  Last HA 270 tablet 1   • CLOPIDOGREL 75 MG Oral Tab TAKE 1 TABLET EVERY DAY 90 tablet 1   • Alcohol Swabs (BD SWAB SINGLE USE REGULAR) Does not apply Pads Uses 5 times a day 500 each 3   • TRUEplus Lancets 33G Does not apply Misc Use to check glucose once daily 10 Chamber Deep and quiet Deep and quiet    Iris Normal Normal    Lens 3+ Nuclear sclerosis, 1+ Cortical cataract 3+ Nuclear sclerosis, 1+ Cortical cataract    Vitreous Vitreous floaters Vitreous floaters          Fundus Exam       Right Left    Disc Good rim

## 2022-01-08 NOTE — ASSESSMENT & PLAN NOTE
Discussed slightly worsened  cataracts in both eyes that are not affecting vision and are not surgical at this time. New glasses Rx given today, only a slight change in the Rx update as needed.

## 2022-01-08 NOTE — PATIENT INSTRUCTIONS
Diabetes mellitus type 2 without retinopathy ([facility] [address]  Diabetes type II: no background of retinopathy, no signs of neovascularization noted. Discussed ocular and systemic benefits of blood sugar control.   Diagnosis and treatment discussed in detail with félix

## 2022-02-12 ENCOUNTER — OFFICE VISIT (OUTPATIENT)
Dept: INTERNAL MEDICINE CLINIC | Facility: CLINIC | Age: 72
End: 2022-02-12
Payer: MEDICARE

## 2022-02-12 VITALS
HEIGHT: 63 IN | HEART RATE: 75 BPM | SYSTOLIC BLOOD PRESSURE: 126 MMHG | DIASTOLIC BLOOD PRESSURE: 68 MMHG | OXYGEN SATURATION: 97 % | WEIGHT: 151 LBS | BODY MASS INDEX: 26.75 KG/M2 | TEMPERATURE: 97 F | RESPIRATION RATE: 18 BRPM

## 2022-02-12 DIAGNOSIS — R53.1 RIGHT SIDED WEAKNESS: ICD-10-CM

## 2022-02-12 DIAGNOSIS — I69.398 ABNORMALITY OF GAIT AS LATE EFFECT OF CEREBROVASCULAR ACCIDENT (CVA): ICD-10-CM

## 2022-02-12 DIAGNOSIS — I50.32 CHRONIC DIASTOLIC CONGESTIVE HEART FAILURE, NYHA CLASS 1 (HCC): ICD-10-CM

## 2022-02-12 DIAGNOSIS — H61.23 EXCESSIVE EAR WAX, BILATERAL: ICD-10-CM

## 2022-02-12 DIAGNOSIS — I10 ESSENTIAL HYPERTENSION: ICD-10-CM

## 2022-02-12 DIAGNOSIS — E11.42 TYPE 2 DIABETES MELLITUS WITH DIABETIC POLYNEUROPATHY, WITHOUT LONG-TERM CURRENT USE OF INSULIN (HCC): ICD-10-CM

## 2022-02-12 DIAGNOSIS — Z00.00 MEDICARE ANNUAL WELLNESS VISIT, SUBSEQUENT: Primary | ICD-10-CM

## 2022-02-12 DIAGNOSIS — I63.9 CEREBROVASCULAR ACCIDENT (CVA), UNSPECIFIED MECHANISM (HCC): ICD-10-CM

## 2022-02-12 DIAGNOSIS — I63.50 CEREBRAL ARTERY OCCLUSION WITH CEREBRAL INFARCTION (HCC): ICD-10-CM

## 2022-02-12 DIAGNOSIS — R26.9 ABNORMALITY OF GAIT AS LATE EFFECT OF CEREBROVASCULAR ACCIDENT (CVA): ICD-10-CM

## 2022-02-12 DIAGNOSIS — R47.1 DYSARTHRIA: ICD-10-CM

## 2022-02-12 DIAGNOSIS — H25.13 AGE-RELATED NUCLEAR CATARACT OF BOTH EYES: ICD-10-CM

## 2022-02-12 DIAGNOSIS — I65.23 CAROTID ARTERY PLAQUE, BILATERAL: ICD-10-CM

## 2022-02-12 DIAGNOSIS — Z78.0 POSTMENOPAUSAL: ICD-10-CM

## 2022-02-12 DIAGNOSIS — E78.5 HYPERLIPIDEMIA, UNSPECIFIED HYPERLIPIDEMIA TYPE: ICD-10-CM

## 2022-02-12 DIAGNOSIS — Z00.00 ENCOUNTER FOR ANNUAL HEALTH EXAMINATION: ICD-10-CM

## 2022-02-12 DIAGNOSIS — M17.11 PRIMARY OSTEOARTHRITIS OF RIGHT KNEE: ICD-10-CM

## 2022-02-12 DIAGNOSIS — D64.9 MILD ANEMIA: ICD-10-CM

## 2022-02-12 LAB
ALBUMIN SERPL-MCNC: 4.4 G/DL (ref 3.4–5)
ALBUMIN/GLOB SERPL: 1.2 {RATIO} (ref 1–2)
ALP LIVER SERPL-CCNC: 57 U/L
ALT SERPL-CCNC: 28 U/L
ANION GAP SERPL CALC-SCNC: 4 MMOL/L (ref 0–18)
AST SERPL-CCNC: 15 U/L (ref 15–37)
BASOPHILS # BLD AUTO: 0.01 X10(3) UL (ref 0–0.2)
BASOPHILS NFR BLD AUTO: 0.2 %
BILIRUB SERPL-MCNC: 0.5 MG/DL (ref 0.1–2)
BILIRUB UR QL: NEGATIVE
BUN BLD-MCNC: 21 MG/DL (ref 7–18)
BUN/CREAT SERPL: 23.1 (ref 10–20)
CALCIUM BLD-MCNC: 9.7 MG/DL (ref 8.5–10.1)
CHLORIDE SERPL-SCNC: 106 MMOL/L (ref 98–112)
CHOLEST SERPL-MCNC: 122 MG/DL (ref ?–200)
CO2 SERPL-SCNC: 31 MMOL/L (ref 21–32)
CREAT BLD-MCNC: 0.91 MG/DL
CREAT UR-SCNC: 181 MG/DL
DEPRECATED RDW RBC AUTO: 46 FL (ref 35.1–46.3)
EOSINOPHIL # BLD AUTO: 0.06 X10(3) UL (ref 0–0.7)
EOSINOPHIL NFR BLD AUTO: 1.1 %
ERYTHROCYTE [DISTWIDTH] IN BLOOD BY AUTOMATED COUNT: 14.9 % (ref 11–15)
EST. AVERAGE GLUCOSE BLD GHB EST-MCNC: 151 MG/DL (ref 68–126)
FASTING PATIENT LIPID ANSWER: YES
FASTING STATUS PATIENT QL REPORTED: YES
GLOBULIN PLAS-MCNC: 3.6 G/DL (ref 2.8–4.4)
GLUCOSE BLD-MCNC: 138 MG/DL (ref 70–99)
GLUCOSE UR-MCNC: NEGATIVE MG/DL
HBA1C MFR BLD: 6.9 % (ref ?–5.7)
HCT VFR BLD AUTO: 39.6 %
HDLC SERPL-MCNC: 74 MG/DL (ref 40–59)
HGB BLD-MCNC: 11.9 G/DL
HGB UR QL STRIP.AUTO: NEGATIVE
IMM GRANULOCYTES # BLD AUTO: 0 X10(3) UL (ref 0–1)
IMM GRANULOCYTES NFR BLD: 0 %
KETONES UR-MCNC: NEGATIVE MG/DL
LDLC SERPL CALC-MCNC: 39 MG/DL (ref ?–100)
LYMPHOCYTES # BLD AUTO: 1.26 X10(3) UL (ref 1–4)
LYMPHOCYTES NFR BLD AUTO: 23.8 %
MCH RBC QN AUTO: 25.2 PG (ref 26–34)
MCHC RBC AUTO-ENTMCNC: 30.1 G/DL (ref 31–37)
MCV RBC AUTO: 83.7 FL
MICROALBUMIN UR-MCNC: 1.67 MG/DL
MICROALBUMIN/CREAT 24H UR-RTO: 9.2 UG/MG (ref ?–30)
MONOCYTES # BLD AUTO: 0.28 X10(3) UL (ref 0.1–1)
MONOCYTES NFR BLD AUTO: 5.3 %
NEUTROPHILS # BLD AUTO: 3.68 X10 (3) UL (ref 1.5–7.7)
NEUTROPHILS # BLD AUTO: 3.68 X10(3) UL (ref 1.5–7.7)
NEUTROPHILS NFR BLD AUTO: 69.6 %
NITRITE UR QL STRIP.AUTO: NEGATIVE
NONHDLC SERPL-MCNC: 48 MG/DL (ref ?–130)
OSMOLALITY SERPL CALC.SUM OF ELEC: 297 MOSM/KG (ref 275–295)
PH UR: 5 [PH] (ref 5–8)
PLATELET # BLD AUTO: 255 10(3)UL (ref 150–450)
POTASSIUM SERPL-SCNC: 4.2 MMOL/L (ref 3.5–5.1)
PROT SERPL-MCNC: 8 G/DL (ref 6.4–8.2)
PROT UR-MCNC: NEGATIVE MG/DL
RBC # BLD AUTO: 4.73 X10(6)UL
SODIUM SERPL-SCNC: 141 MMOL/L (ref 136–145)
SP GR UR STRIP: 1.02 (ref 1–1.03)
TRIGL SERPL-MCNC: 31 MG/DL (ref 30–149)
TSI SER-ACNC: 1.04 MIU/ML (ref 0.36–3.74)
UROBILINOGEN UR STRIP-ACNC: <2
VLDLC SERPL CALC-MCNC: 4 MG/DL (ref 0–30)
WBC # BLD AUTO: 5.3 X10(3) UL (ref 4–11)

## 2022-02-12 PROCEDURE — 3061F NEG MICROALBUMINURIA REV: CPT | Performed by: INTERNAL MEDICINE

## 2022-02-12 PROCEDURE — 96160 PT-FOCUSED HLTH RISK ASSMT: CPT | Performed by: INTERNAL MEDICINE

## 2022-02-12 PROCEDURE — 36415 COLL VENOUS BLD VENIPUNCTURE: CPT | Performed by: INTERNAL MEDICINE

## 2022-02-12 PROCEDURE — G0439 PPPS, SUBSEQ VISIT: HCPCS | Performed by: INTERNAL MEDICINE

## 2022-02-12 PROCEDURE — 99397 PER PM REEVAL EST PAT 65+ YR: CPT | Performed by: INTERNAL MEDICINE

## 2022-02-12 PROCEDURE — 3078F DIAST BP <80 MM HG: CPT | Performed by: INTERNAL MEDICINE

## 2022-02-12 PROCEDURE — 3074F SYST BP LT 130 MM HG: CPT | Performed by: INTERNAL MEDICINE

## 2022-02-12 PROCEDURE — 3044F HG A1C LEVEL LT 7.0%: CPT | Performed by: INTERNAL MEDICINE

## 2022-02-12 PROCEDURE — 3008F BODY MASS INDEX DOCD: CPT | Performed by: INTERNAL MEDICINE

## 2022-03-07 RX ORDER — METOPROLOL SUCCINATE 25 MG/1
TABLET, EXTENDED RELEASE ORAL
Qty: 90 TABLET | Refills: 1 | Status: SHIPPED | OUTPATIENT
Start: 2022-03-07

## 2022-03-08 ENCOUNTER — OFFICE VISIT (OUTPATIENT)
Dept: INTERNAL MEDICINE CLINIC | Facility: CLINIC | Age: 72
End: 2022-03-08
Payer: MEDICARE

## 2022-03-08 VITALS
DIASTOLIC BLOOD PRESSURE: 78 MMHG | RESPIRATION RATE: 17 BRPM | TEMPERATURE: 98 F | WEIGHT: 151 LBS | SYSTOLIC BLOOD PRESSURE: 126 MMHG | OXYGEN SATURATION: 98 % | HEIGHT: 63 IN | HEART RATE: 76 BPM | BODY MASS INDEX: 26.75 KG/M2

## 2022-03-08 DIAGNOSIS — M17.11 PRIMARY OSTEOARTHRITIS OF RIGHT KNEE: Primary | ICD-10-CM

## 2022-03-08 PROCEDURE — 3078F DIAST BP <80 MM HG: CPT | Performed by: INTERNAL MEDICINE

## 2022-03-08 PROCEDURE — 3074F SYST BP LT 130 MM HG: CPT | Performed by: INTERNAL MEDICINE

## 2022-03-08 PROCEDURE — 99214 OFFICE O/P EST MOD 30 MIN: CPT | Performed by: INTERNAL MEDICINE

## 2022-03-08 PROCEDURE — 3008F BODY MASS INDEX DOCD: CPT | Performed by: INTERNAL MEDICINE

## 2022-05-09 RX ORDER — CLOPIDOGREL BISULFATE 75 MG/1
TABLET ORAL
Qty: 90 TABLET | Refills: 1 | Status: SHIPPED | OUTPATIENT
Start: 2022-05-09

## 2022-05-09 RX ORDER — ATORVASTATIN CALCIUM 20 MG/1
TABLET, FILM COATED ORAL
Qty: 90 TABLET | Refills: 1 | Status: SHIPPED | OUTPATIENT
Start: 2022-05-09

## 2022-05-09 RX ORDER — GLUCOSAM/CHON-MSM1/C/MANG/BOSW 500-416.6
TABLET ORAL
Qty: 100 EACH | Refills: 3 | Status: SHIPPED | OUTPATIENT
Start: 2022-05-09

## 2022-05-09 RX ORDER — LISINOPRIL AND HYDROCHLOROTHIAZIDE 25; 20 MG/1; MG/1
TABLET ORAL
Qty: 90 TABLET | Refills: 1 | Status: SHIPPED | OUTPATIENT
Start: 2022-05-09

## 2022-05-09 RX ORDER — CALCIUM CITRATE/VITAMIN D3 200MG-6.25
TABLET ORAL
Qty: 100 STRIP | Refills: 3 | Status: SHIPPED | OUTPATIENT
Start: 2022-05-09

## 2022-05-21 ENCOUNTER — OFFICE VISIT (OUTPATIENT)
Dept: INTERNAL MEDICINE CLINIC | Facility: CLINIC | Age: 72
End: 2022-05-21
Payer: MEDICARE

## 2022-05-21 VITALS
OXYGEN SATURATION: 99 % | HEART RATE: 72 BPM | RESPIRATION RATE: 17 BRPM | WEIGHT: 153 LBS | DIASTOLIC BLOOD PRESSURE: 68 MMHG | SYSTOLIC BLOOD PRESSURE: 124 MMHG | TEMPERATURE: 98 F | BODY MASS INDEX: 27.11 KG/M2 | HEIGHT: 63 IN

## 2022-05-21 DIAGNOSIS — E11.9 DIABETES MELLITUS TYPE 2 WITHOUT RETINOPATHY (HCC): ICD-10-CM

## 2022-05-21 DIAGNOSIS — E87.6 HYPOKALEMIA: ICD-10-CM

## 2022-05-21 DIAGNOSIS — I10 ESSENTIAL HYPERTENSION: ICD-10-CM

## 2022-05-21 DIAGNOSIS — R79.0 LOW MAGNESIUM LEVEL: ICD-10-CM

## 2022-05-21 DIAGNOSIS — M17.11 PRIMARY OSTEOARTHRITIS OF RIGHT KNEE: Primary | ICD-10-CM

## 2022-05-21 LAB
ANION GAP SERPL CALC-SCNC: 6 MMOL/L (ref 0–18)
BUN BLD-MCNC: 17 MG/DL (ref 7–18)
BUN/CREAT SERPL: 18.1 (ref 10–20)
CALCIUM BLD-MCNC: 9.7 MG/DL (ref 8.5–10.1)
CHLORIDE SERPL-SCNC: 103 MMOL/L (ref 98–112)
CO2 SERPL-SCNC: 32 MMOL/L (ref 21–32)
CREAT BLD-MCNC: 0.94 MG/DL
FASTING STATUS PATIENT QL REPORTED: YES
GLUCOSE BLD-MCNC: 131 MG/DL (ref 70–99)
MAGNESIUM SERPL-MCNC: 1.6 MG/DL (ref 1.6–2.6)
OSMOLALITY SERPL CALC.SUM OF ELEC: 295 MOSM/KG (ref 275–295)
POTASSIUM SERPL-SCNC: 4 MMOL/L (ref 3.5–5.1)
SODIUM SERPL-SCNC: 141 MMOL/L (ref 136–145)

## 2022-05-21 PROCEDURE — 3074F SYST BP LT 130 MM HG: CPT | Performed by: INTERNAL MEDICINE

## 2022-05-21 PROCEDURE — 3008F BODY MASS INDEX DOCD: CPT | Performed by: INTERNAL MEDICINE

## 2022-05-21 PROCEDURE — 99214 OFFICE O/P EST MOD 30 MIN: CPT | Performed by: INTERNAL MEDICINE

## 2022-05-21 PROCEDURE — 36415 COLL VENOUS BLD VENIPUNCTURE: CPT | Performed by: INTERNAL MEDICINE

## 2022-05-21 PROCEDURE — 3078F DIAST BP <80 MM HG: CPT | Performed by: INTERNAL MEDICINE

## 2022-08-18 RX ORDER — METOPROLOL SUCCINATE 25 MG/1
TABLET, EXTENDED RELEASE ORAL
Qty: 90 TABLET | Refills: 1 | Status: SHIPPED | OUTPATIENT
Start: 2022-08-18

## 2022-08-26 ENCOUNTER — OFFICE VISIT (OUTPATIENT)
Dept: INTERNAL MEDICINE CLINIC | Facility: CLINIC | Age: 72
End: 2022-08-26
Payer: MEDICARE

## 2022-08-26 VITALS
RESPIRATION RATE: 17 BRPM | TEMPERATURE: 98 F | DIASTOLIC BLOOD PRESSURE: 64 MMHG | HEIGHT: 63 IN | SYSTOLIC BLOOD PRESSURE: 126 MMHG | BODY MASS INDEX: 27.29 KG/M2 | OXYGEN SATURATION: 98 % | WEIGHT: 154 LBS | HEART RATE: 78 BPM

## 2022-08-26 DIAGNOSIS — M17.11 PRIMARY OSTEOARTHRITIS OF RIGHT KNEE: Primary | ICD-10-CM

## 2022-08-26 PROCEDURE — 1125F AMNT PAIN NOTED PAIN PRSNT: CPT | Performed by: INTERNAL MEDICINE

## 2022-08-26 PROCEDURE — 3008F BODY MASS INDEX DOCD: CPT | Performed by: INTERNAL MEDICINE

## 2022-08-26 PROCEDURE — 99214 OFFICE O/P EST MOD 30 MIN: CPT | Performed by: INTERNAL MEDICINE

## 2022-08-26 PROCEDURE — 3074F SYST BP LT 130 MM HG: CPT | Performed by: INTERNAL MEDICINE

## 2022-08-26 PROCEDURE — 3078F DIAST BP <80 MM HG: CPT | Performed by: INTERNAL MEDICINE

## 2022-10-08 ENCOUNTER — OFFICE VISIT (OUTPATIENT)
Dept: INTERNAL MEDICINE CLINIC | Facility: CLINIC | Age: 72
End: 2022-10-08
Payer: MEDICARE

## 2022-10-08 VITALS
HEART RATE: 64 BPM | RESPIRATION RATE: 16 BRPM | WEIGHT: 148 LBS | BODY MASS INDEX: 26.22 KG/M2 | SYSTOLIC BLOOD PRESSURE: 150 MMHG | TEMPERATURE: 98 F | HEIGHT: 63 IN | DIASTOLIC BLOOD PRESSURE: 83 MMHG | OXYGEN SATURATION: 98 %

## 2022-10-08 DIAGNOSIS — I63.9 CEREBROVASCULAR ACCIDENT (CVA), UNSPECIFIED MECHANISM (HCC): ICD-10-CM

## 2022-10-08 DIAGNOSIS — I50.32 CHRONIC DIASTOLIC CONGESTIVE HEART FAILURE, NYHA CLASS 1 (HCC): ICD-10-CM

## 2022-10-08 DIAGNOSIS — I10 ESSENTIAL HYPERTENSION: ICD-10-CM

## 2022-10-08 DIAGNOSIS — Z00.00 ENCOUNTER FOR PREVENTIVE CARE: ICD-10-CM

## 2022-10-08 DIAGNOSIS — E78.5 HYPERLIPIDEMIA, UNSPECIFIED HYPERLIPIDEMIA TYPE: ICD-10-CM

## 2022-10-08 DIAGNOSIS — E11.9 DIABETES MELLITUS TYPE 2 WITHOUT RETINOPATHY (HCC): Primary | ICD-10-CM

## 2022-10-08 LAB
CHOLEST SERPL-MCNC: 133 MG/DL (ref ?–200)
EST. AVERAGE GLUCOSE BLD GHB EST-MCNC: 148 MG/DL (ref 68–126)
FASTING PATIENT LIPID ANSWER: YES
HBA1C MFR BLD: 6.8 % (ref ?–5.7)
HDLC SERPL-MCNC: 75 MG/DL (ref 40–59)
LDLC SERPL CALC-MCNC: 47 MG/DL (ref ?–100)
NONHDLC SERPL-MCNC: 58 MG/DL (ref ?–130)
TRIGL SERPL-MCNC: 49 MG/DL (ref 30–149)
VLDLC SERPL CALC-MCNC: 7 MG/DL (ref 0–30)

## 2022-10-08 PROCEDURE — 3044F HG A1C LEVEL LT 7.0%: CPT | Performed by: INTERNAL MEDICINE

## 2022-10-08 PROCEDURE — 3079F DIAST BP 80-89 MM HG: CPT | Performed by: INTERNAL MEDICINE

## 2022-10-08 PROCEDURE — 90662 IIV NO PRSV INCREASED AG IM: CPT | Performed by: INTERNAL MEDICINE

## 2022-10-08 PROCEDURE — 3077F SYST BP >= 140 MM HG: CPT | Performed by: INTERNAL MEDICINE

## 2022-10-08 PROCEDURE — 36415 COLL VENOUS BLD VENIPUNCTURE: CPT | Performed by: INTERNAL MEDICINE

## 2022-10-08 PROCEDURE — G0008 ADMIN INFLUENZA VIRUS VAC: HCPCS | Performed by: INTERNAL MEDICINE

## 2022-10-08 PROCEDURE — 3008F BODY MASS INDEX DOCD: CPT | Performed by: INTERNAL MEDICINE

## 2022-10-08 PROCEDURE — 1126F AMNT PAIN NOTED NONE PRSNT: CPT | Performed by: INTERNAL MEDICINE

## 2022-10-08 PROCEDURE — 99214 OFFICE O/P EST MOD 30 MIN: CPT | Performed by: INTERNAL MEDICINE

## 2022-10-08 RX ORDER — AMLODIPINE BESYLATE 2.5 MG/1
2.5 TABLET ORAL DAILY
Qty: 30 TABLET | Refills: 2 | Status: SHIPPED | OUTPATIENT
Start: 2022-10-08

## 2022-10-20 ENCOUNTER — HOSPITAL ENCOUNTER (OUTPATIENT)
Dept: MAMMOGRAPHY | Age: 72
Discharge: HOME | End: 2022-10-20
Attending: INTERNAL MEDICINE
Payer: MEDICARE

## 2022-10-20 DIAGNOSIS — Z00.00 ENCOUNTER FOR PREVENTIVE CARE: ICD-10-CM

## 2022-10-20 PROCEDURE — 77063 BREAST TOMOSYNTHESIS BI: CPT | Performed by: INTERNAL MEDICINE

## 2022-10-20 PROCEDURE — 77067 SCR MAMMO BI INCL CAD: CPT | Performed by: INTERNAL MEDICINE

## 2022-11-12 ENCOUNTER — TELEPHONE (OUTPATIENT)
Dept: INTERNAL MEDICINE CLINIC | Facility: CLINIC | Age: 72
End: 2022-11-12

## 2022-11-12 ENCOUNTER — NURSE ONLY (OUTPATIENT)
Dept: INTERNAL MEDICINE CLINIC | Facility: CLINIC | Age: 72
End: 2022-11-12
Payer: MEDICARE

## 2022-11-12 VITALS — OXYGEN SATURATION: 99 % | DIASTOLIC BLOOD PRESSURE: 70 MMHG | HEART RATE: 59 BPM | SYSTOLIC BLOOD PRESSURE: 122 MMHG

## 2022-11-12 DIAGNOSIS — Z01.30 BLOOD PRESSURE CHECK: Primary | ICD-10-CM

## 2022-11-12 NOTE — TELEPHONE ENCOUNTER
Patient was here for blood pressure check. Manual blood pressure.   Left arm: 142/72 p 59  Right arm : 122/74    After 5 minutes resting:  Left arm 122/70

## 2022-11-21 NOTE — TELEPHONE ENCOUNTER
Refill passed per [address] protocol.     .  Requested Prescriptions   Pending Prescriptions Disp Refills    TRUE METRIX METER w/Device Does not apply Kit [Pharmacy Med Name: Anshu EchevarriaEtsharri Fink w/Device  Kit] 1 kit 0     Sig: USE AS DIRECTED       Diabetic Supplies Protocol Passed - 11/21/2022 10:53 AM        Passed - In person appointment or virtual visit in the past 12 mos or appointment in next 3 mos     Recent Outpatient Visits              1 week ago Blood pressure check    [address], [address], [place]    Nurse Only    1 month ago Diabetes mellitus type 2 without retinopathy [facility])    [address], [address], [place]    Office Visit    2 months ago Primary osteoarthritis of right knee    [facility] Omid Shaffer MD    Office Visit    6 months ago Primary osteoarthritis of right knee    [facility], Omid Shaffer MD    Office Visit    8 months ago Primary osteoarthritis of right knee    [facility], Santa Rodgers MD    Office Visit          Future Appointments         Provider Department Appt Notes    In 1 week [facility], [address] 1 year  \"policy informed\"     In 1 month Anthony Patterson MD [facility] Ophthalmology DM EE                    Recent Outpatient Visits              1 week ago Blood pressure check    [address], [address], [place]    Nurse Only    1 month ago Diabetes mellitus type 2 without retinopathy [facility])    Connie Puetnes MD    Office Visit    2 months ago Primary osteoarthritis of right knee    Connie Puentes MD    Office Visit    6 months ago Primary osteoarthritis of right knee    Connie Puentes MD    Office Visit    8 months ago Primary osteoarthritis of right knee    [facility], [facility], [address], Negro Peterson MD    Office Visit            Future Appointments         Provider Department Appt Notes    In 1 week [facility], [address] 1 year  \"policy informed\"     In 1 month Bárbara Cunha MD [facility] Ophthalmology MICKEY CROSS

## 2022-11-29 ENCOUNTER — OFFICE VISIT (OUTPATIENT)
Dept: PODIATRY CLINIC | Facility: CLINIC | Age: 72
End: 2022-11-29
Payer: MEDICARE

## 2022-11-29 DIAGNOSIS — E11.9 TYPE 2 DIABETES MELLITUS WITHOUT COMPLICATION, WITH LONG-TERM CURRENT USE OF INSULIN (HCC): Primary | ICD-10-CM

## 2022-11-29 DIAGNOSIS — Z79.4 TYPE 2 DIABETES MELLITUS WITHOUT COMPLICATION, WITH LONG-TERM CURRENT USE OF INSULIN (HCC): Primary | ICD-10-CM

## 2022-11-29 PROCEDURE — 99213 OFFICE O/P EST LOW 20 MIN: CPT | Performed by: PODIATRIST

## 2022-11-29 PROCEDURE — 1125F AMNT PAIN NOTED PAIN PRSNT: CPT | Performed by: PODIATRIST

## 2022-12-29 ENCOUNTER — OFFICE VISIT (OUTPATIENT)
Dept: INTERNAL MEDICINE CLINIC | Facility: CLINIC | Age: 72
End: 2022-12-29
Payer: MEDICARE

## 2022-12-29 ENCOUNTER — HOSPITAL ENCOUNTER (OUTPATIENT)
Dept: GENERAL RADIOLOGY | Facility: HOSPITAL | Age: 72
Discharge: HOME | End: 2022-12-29
Attending: INTERNAL MEDICINE
Payer: MEDICARE

## 2022-12-29 VITALS
SYSTOLIC BLOOD PRESSURE: 132 MMHG | WEIGHT: 151 LBS | DIASTOLIC BLOOD PRESSURE: 66 MMHG | BODY MASS INDEX: 26.75 KG/M2 | RESPIRATION RATE: 17 BRPM | HEART RATE: 67 BPM | TEMPERATURE: 98 F | OXYGEN SATURATION: 98 % | HEIGHT: 63 IN

## 2022-12-29 DIAGNOSIS — M25.562 ACUTE PAIN OF LEFT KNEE: Primary | ICD-10-CM

## 2022-12-29 DIAGNOSIS — M25.562 ACUTE PAIN OF LEFT KNEE: ICD-10-CM

## 2022-12-29 PROCEDURE — 3078F DIAST BP <80 MM HG: CPT | Performed by: INTERNAL MEDICINE

## 2022-12-29 PROCEDURE — 1125F AMNT PAIN NOTED PAIN PRSNT: CPT | Performed by: INTERNAL MEDICINE

## 2022-12-29 PROCEDURE — 3075F SYST BP GE 130 - 139MM HG: CPT | Performed by: INTERNAL MEDICINE

## 2022-12-29 PROCEDURE — 3008F BODY MASS INDEX DOCD: CPT | Performed by: INTERNAL MEDICINE

## 2022-12-29 PROCEDURE — 99213 OFFICE O/P EST LOW 20 MIN: CPT | Performed by: INTERNAL MEDICINE

## 2022-12-29 PROCEDURE — 73562 X-RAY EXAM OF KNEE 3: CPT | Performed by: INTERNAL MEDICINE

## 2022-12-29 RX ORDER — PYRAZINAMIDE 500 MG/1
1 TABLET ORAL EVERY 12 HOURS PRN
Qty: 30 TABLET | Refills: 0 | Status: SHIPPED | OUTPATIENT
Start: 2022-12-29

## 2023-01-05 ENCOUNTER — OFFICE VISIT (OUTPATIENT)
Dept: INTERNAL MEDICINE CLINIC | Facility: CLINIC | Age: 73
End: 2023-01-05
Payer: MEDICARE

## 2023-01-05 VITALS
SYSTOLIC BLOOD PRESSURE: 128 MMHG | WEIGHT: 149 LBS | DIASTOLIC BLOOD PRESSURE: 62 MMHG | HEIGHT: 63 IN | TEMPERATURE: 98 F | OXYGEN SATURATION: 98 % | RESPIRATION RATE: 18 BRPM | BODY MASS INDEX: 26.4 KG/M2 | HEART RATE: 69 BPM

## 2023-01-05 DIAGNOSIS — M17.12 PRIMARY OSTEOARTHRITIS OF LEFT KNEE: Primary | ICD-10-CM

## 2023-01-05 PROCEDURE — 3008F BODY MASS INDEX DOCD: CPT | Performed by: INTERNAL MEDICINE

## 2023-01-05 PROCEDURE — 1125F AMNT PAIN NOTED PAIN PRSNT: CPT | Performed by: INTERNAL MEDICINE

## 2023-01-05 PROCEDURE — 3074F SYST BP LT 130 MM HG: CPT | Performed by: INTERNAL MEDICINE

## 2023-01-05 PROCEDURE — 99214 OFFICE O/P EST MOD 30 MIN: CPT | Performed by: INTERNAL MEDICINE

## 2023-01-05 PROCEDURE — 3078F DIAST BP <80 MM HG: CPT | Performed by: INTERNAL MEDICINE

## 2023-01-12 ENCOUNTER — OFFICE VISIT (OUTPATIENT)
Dept: OPHTHALMOLOGY | Facility: CLINIC | Age: 73
End: 2023-01-12

## 2023-01-12 DIAGNOSIS — H43.393 FLOATER, VITREOUS, BILATERAL: ICD-10-CM

## 2023-01-12 DIAGNOSIS — H25.13 AGE-RELATED NUCLEAR CATARACT OF BOTH EYES: ICD-10-CM

## 2023-01-12 DIAGNOSIS — E11.9 DIABETES MELLITUS TYPE 2 WITHOUT RETINOPATHY (HCC): Primary | ICD-10-CM

## 2023-01-12 PROCEDURE — 2023F DILAT RTA XM W/O RTNOPTHY: CPT | Performed by: OPHTHALMOLOGY

## 2023-01-12 PROCEDURE — 92015 DETERMINE REFRACTIVE STATE: CPT | Performed by: OPHTHALMOLOGY

## 2023-01-12 PROCEDURE — 92014 COMPRE OPH EXAM EST PT 1/>: CPT | Performed by: OPHTHALMOLOGY

## 2023-01-12 NOTE — PATIENT INSTRUCTIONS
Diabetes mellitus type 2 without retinopathy ([facility] [address]  Diabetes type II: no background of retinopathy, no signs of neovascularization noted. Discussed ocular and systemic benefits of blood sugar control. Diagnosis and treatment discussed in detail with patient. Age-related nuclear cataract of both eyes  Discussed cataracts in detail with patient. Discussed that cataracts are advanced enough to consider cataract surgery, but it would be patient's choice. Options:  1.) Continue with same glasses  2.) Update glasses (but no improvement in vision)  3.) Refer to [address] [place] [address] ophthalmology for evaluation and possible surgery     Patient declines surgery at this time but will call the office if she would like to be referred to [address] [place] [address] ophthalmology as Dr. Toshia Matute is no longer performing cataract surgery. Information on [address] [place] [address] ophthalmology given. Floater, vitreous, bilateral  No treatment.

## 2023-01-12 NOTE — ASSESSMENT & PLAN NOTE
Discussed cataracts in detail with patient. Discussed that cataracts are advanced enough to consider cataract surgery, but it would be patient's choice. Options:  1.) Continue with same glasses  2.) Update glasses (but no improvement in vision)  3.) Refer to [address] [place] [address] ophthalmology for evaluation and possible surgery     Patient declines surgery at this time but will call the office if she would like to be referred to [address] [place] [address] ophthalmology as Dr. Sorto is no longer performing cataract surgery. Information on [address] [place] [address] ophthalmology given.

## 2023-02-09 ENCOUNTER — TELEPHONE (OUTPATIENT)
Dept: INTERNAL MEDICINE CLINIC | Facility: CLINIC | Age: 73
End: 2023-02-09

## 2023-02-09 RX ORDER — PYRAZINAMIDE 500 MG/1
1 TABLET ORAL EVERY 12 HOURS PRN
Qty: 30 TABLET | Refills: 0 | Status: SHIPPED | OUTPATIENT
Start: 2023-02-09

## 2023-02-09 NOTE — TELEPHONE ENCOUNTER
Patient states right knee is very swollen and painful. Patient gets knee injections for her osteoarthritis every 6 months. Patient asking if she could come in for steroid injection. Last steroid injection on right knee was 8/22/23, a little less than 6 months ago.      Patient also asking for refill of her pain medication, Tylenol #3, medication pended

## 2023-02-09 NOTE — TELEPHONE ENCOUNTER
Called pt back, informed her of msg below. Attempted to her schedule her form tomorrow, pt declined, states she would like a [facility] appt. Informed her that Shawn's next Saturday was Feb 18th.    Pt agreed to appt @ 10:30 for 2/18/23  Just FYI dr.

## 2023-02-18 ENCOUNTER — OFFICE VISIT (OUTPATIENT)
Dept: INTERNAL MEDICINE CLINIC | Facility: CLINIC | Age: 73
End: 2023-02-18

## 2023-02-18 VITALS
SYSTOLIC BLOOD PRESSURE: 124 MMHG | BODY MASS INDEX: 25.69 KG/M2 | HEART RATE: 70 BPM | RESPIRATION RATE: 17 BRPM | OXYGEN SATURATION: 98 % | WEIGHT: 145 LBS | TEMPERATURE: 98 F | HEIGHT: 63 IN | DIASTOLIC BLOOD PRESSURE: 72 MMHG

## 2023-02-18 DIAGNOSIS — M25.562 CHRONIC PAIN OF BOTH KNEES: ICD-10-CM

## 2023-02-18 DIAGNOSIS — M25.561 CHRONIC PAIN OF BOTH KNEES: ICD-10-CM

## 2023-02-18 DIAGNOSIS — G89.29 CHRONIC PAIN OF BOTH KNEES: ICD-10-CM

## 2023-02-18 DIAGNOSIS — M17.0 PRIMARY OSTEOARTHRITIS OF BOTH KNEES: Primary | ICD-10-CM

## 2023-02-18 PROCEDURE — 3074F SYST BP LT 130 MM HG: CPT | Performed by: INTERNAL MEDICINE

## 2023-02-18 PROCEDURE — 3078F DIAST BP <80 MM HG: CPT | Performed by: INTERNAL MEDICINE

## 2023-02-18 PROCEDURE — 3008F BODY MASS INDEX DOCD: CPT | Performed by: INTERNAL MEDICINE

## 2023-02-18 PROCEDURE — 1125F AMNT PAIN NOTED PAIN PRSNT: CPT | Performed by: INTERNAL MEDICINE

## 2023-02-18 PROCEDURE — 99214 OFFICE O/P EST MOD 30 MIN: CPT | Performed by: INTERNAL MEDICINE

## 2023-02-25 RX ORDER — CLOPIDOGREL BISULFATE 75 MG/1
TABLET ORAL
Qty: 90 TABLET | Refills: 1 | Status: SHIPPED | OUTPATIENT
Start: 2023-02-25

## 2023-02-25 RX ORDER — DIPHENHYDRAMINE HCL 25 MG
TABLET ORAL
Qty: 1 KIT | Refills: 0 | OUTPATIENT
Start: 2023-02-25

## 2023-02-25 RX ORDER — LISINOPRIL AND HYDROCHLOROTHIAZIDE 25; 20 MG/1; MG/1
TABLET ORAL
Qty: 90 TABLET | Refills: 1 | Status: SHIPPED | OUTPATIENT
Start: 2023-02-25

## 2023-02-25 NOTE — TELEPHONE ENCOUNTER
Please review. Protocol failed/ No protocol. Requested Prescriptions   Pending Prescriptions Disp Refills    LISINOPRIL-HYDROCHLOROTHIAZIDE 20-25 MG Oral Tab [Pharmacy Med Name: LISINOPRIL/HYDROCHLOROTHIAZIDE 20-25 MG Tablet] 90 tablet 1     Sig: TAKE 1 TABLET EVERY DAY       Hypertensive Medications Protocol Failed - 2/24/2023  2:55 PM        Failed - CMP or BMP in past 6 months     No results found for this or any previous visit (from the past 4392 hour(s)).             Passed - In person appointment in the past 12 or next 3 months     Recent Outpatient Visits              1 week ago Primary osteoarthritis of both knees    [address], [address], [place]    Office Visit    1 month ago Diabetes mellitus type 2 without retinopathy ([facility] [address]    [address], [address], Georgina Jimenez MD    Office Visit    1 month ago Primary osteoarthritis of left knee    [employer], [address], [place]    Office Visit    1 month ago Acute pain of left knee    [address], [address], Bird Brandt MD    Office Visit    2 months ago Type 2 diabetes mellitus without complication, with long-term current use of insulin ([facility] [address]    [address], [address], [place], DORCAS    Office Visit          Future Appointments         Provider Department Appt Notes    In 1 month Kike Ashford MD [address], [address], [place] Primary osteoarthritis of both knees    In 2 months Trevor Brock MD [address], [address], [place] annual, 3 month follow up    In 9 months Maikel Alves, [address], [address], [place] Phil Campbell, [place] PT    In 10 months Caryn Joe MD [address], [address], [place] EP/ 1 yr DM EE               Passed - Last BP reading less than 140/90     BP Readings from Last 1 Encounters:  02/18/23 : 124/72              Passed - In person appointment or virtual visit in the past 6 months     Recent Outpatient Visits              1 week ago Primary osteoarthritis of both knees    [facility], [address], Jose A Zepeda MD    Office Visit    1 month ago Diabetes mellitus type 2 without retinopathy ([facility] [address]    [address], [address], Raul Badillo MD    Office Visit    1 month ago Primary osteoarthritis of left knee    [facility], [address], [place], Atiya Deras MD    Office Visit    1 month ago Acute pain of left knee    [address], [address], [place] MD    Office Visit    2 months ago Type 2 diabetes mellitus without complication, with long-term current use of insulin ([facility] [address]    [address], [address], [place] Vj Santamaria DPM    Office Visit          Future Appointments         Provider Department Appt Notes    In 1 month Harper Martinez MD [address], [address] [facility] Primary osteoarthritis of both knees    In 2 months Juan Soriano MD [address], [address] [facility], [facility] annual, 3 month follow up    In 9 months Papi Henriquez, [address], [address] [facility] DM [place], [place] PT    In 10 months Yo Rodriges MD [address], [address] [facility], [facility] EP/ 1 yr DM EE               Passed - EGFRCR or GFRAA > 50     GFR Evaluation  GFRAA: 71 , resulted on 5/21/2022            CLOPIDOGREL 75 MG Oral Tab [Pharmacy Med Name: CLOPIDOGREL 75 MG Tablet] 90 tablet 1     Sig: TAKE 1 TABLET EVERY DAY       There is no refill protocol information for this order      Refused Prescriptions Disp Refills    Blood Glucose Monitoring Suppl (TRUE METRIX AIR GLUCOSE METER) w/Device Does not apply Kit [Pharmacy Med Name: Maralee Dancer METRIX AIR W/BLUETOOTH SMART w/Device  Kit] 1 kit 0     Sig: USE AS DIRECTED       Diabetic Supplies Protocol Passed - 2/24/2023  2:55 PM        Passed - In person appointment or virtual visit in the past 12 mos or appointment in next 3 mos     Recent Outpatient Visits              1 week ago Primary osteoarthritis of both Marie Bodily, [address], Levora Phalen, MD    Office Visit    1 month ago Diabetes mellitus type 2 without retinopathy ([facility] [address]    Anita Daniel, [address], Daren Spencer MD    Office Visit    1 month ago Primary osteoarthritis of left knee    [employer], [address], Sol Cazares MD    Office Visit    1 month ago Acute pain of left knee    Anita Daniel, [address], Shawn Cazares MD    Office Visit    2 months ago Type 2 diabetes mellitus without complication, with long-term current use of insulin ([facility] [address]    Anita Slot, [address], Elisa Aldridge [place], [place]    Office Visit          Future Appointments         Provider Department Appt Notes    In 1 month MD Anita Harvey [place], [address], [place] Primary osteoarthritis of both knees    In 2 months MD Anita Andrews [place], [address], [place] annual, 3 month follow up    In 9 months Black Never, [address], [address], [place] DM [place], [place] PT    In 10 months MD Anita Mancia [place], [address], [place] EP/ 1 yr DM EE                    Recent Outpatient Visits              1 week ago Primary osteoarthritis of both Marie Bodily, [address], [place] Bo MD    Office Visit    1 month ago Diabetes mellitus type 2 without retinopathy [facility])    Daren Daly MD    Office Visit 1 month ago Primary osteoarthritis of left knee    [facility], [address], [place], Sandy Smith MD    Office Visit    1 month ago Acute pain of left knee    [address], [address], [place] MD    Office Visit    2 months ago Type 2 diabetes mellitus without complication, with long-term current use of insulin ([facility] [address]    [address], [address], [place] Scot Santamaria DPM    Office Visit            Future Appointments         Provider Department Appt Notes    In 1 month Elida Robert MD [address], [address], [place] Primary osteoarthritis of both knees    In 2 months Eve Harrison MD [address], [address], [place] annual, 3 month follow up    In 9 months [facility]o [facility], [address], [place] DM [place], [place] PT    In 10 months Jeff Dunn MD [address], [address], [place] EP/ 1 yr MICKEY

## 2023-02-27 RX ORDER — ATORVASTATIN CALCIUM 20 MG/1
20 TABLET, FILM COATED ORAL DAILY
Qty: 90 TABLET | Refills: 1 | Status: SHIPPED | OUTPATIENT
Start: 2023-02-27

## 2023-02-27 NOTE — TELEPHONE ENCOUNTER
Please review; protocol failed/ no protocol  Medication pended for your review and approval.     Requested Prescriptions   Pending Prescriptions Disp Refills    ATORVASTATIN 20 MG Oral Tab [Pharmacy Med Name: ATORVASTATIN CALCIUM 20 MG Tablet] 90 tablet 1     Sig: TAKE 1 TABLET EVERY DAY       Cholesterol Medication Protocol Failed - 2/24/2023  2:55 PM        Failed - ALT in past 12 months        Failed - Last ALT < 80     Lab Results   Component Value Date    ALT 28 02/12/2022             Passed - LDL in past 12 months        Passed - Last LDL < 130     Lab Results   Component Value Date    LDL 47 10/08/2022             Passed - In person appointment or virtual visit in the past 12 mos or appointment in next 3 mos     Recent Outpatient Visits              1 week ago Primary osteoarthritis of both Bakari Dejesus MD    Office Visit    1 month ago Diabetes mellitus type 2 without retinopathy ([address]    [facility], [address] [facility], Whitney Jimenez MD    Office Visit    1 month ago Primary osteoarthritis of left knee    [facility], [address], Osman Resendiz MD    Office Visit    2 months ago Acute pain of left knee    [facility], [address], Shawn Cazares MD    Office Visit    3 months ago Type 2 diabetes mellitus without complication, with long-term current use of insulin ([address]    [facility], [address] [facility], Mathew Aldridge, DPLEOBARDO    Office Visit          Future Appointments         Provider Department Appt Notes    In 1 month MD Claire Carrasco [place] Primary osteoarthritis of both knees    In 2 months Kia Phillips MD [facility], [address], [place] annual, 3 month follow up    In 9 months Nannette Mack, [address], [address], [place], SCR PT    In 10 months Yo Rodriges MD [facility], [address], [place] EP/ 1 yr MICKEY CROSS

## 2023-03-02 ENCOUNTER — HOSPITAL ENCOUNTER (OUTPATIENT)
Age: 73
Discharge: HOME | End: 2023-03-02
Attending: EMERGENCY MEDICINE
Payer: MEDICARE

## 2023-03-02 ENCOUNTER — NURSE TRIAGE (OUTPATIENT)
Dept: INTERNAL MEDICINE CLINIC | Facility: CLINIC | Age: 73
End: 2023-03-02

## 2023-03-02 ENCOUNTER — APPOINTMENT (OUTPATIENT)
Dept: GENERAL RADIOLOGY | Age: 73
End: 2023-03-02
Attending: EMERGENCY MEDICINE
Payer: MEDICARE

## 2023-03-02 VITALS
SYSTOLIC BLOOD PRESSURE: 141 MMHG | WEIGHT: 147 LBS | TEMPERATURE: 99 F | HEIGHT: 63 IN | OXYGEN SATURATION: 99 % | RESPIRATION RATE: 20 BRPM | DIASTOLIC BLOOD PRESSURE: 75 MMHG | BODY MASS INDEX: 26.05 KG/M2 | HEART RATE: 86 BPM

## 2023-03-02 DIAGNOSIS — M17.11 OSTEOARTHRITIS OF RIGHT KNEE, UNSPECIFIED OSTEOARTHRITIS TYPE: Primary | ICD-10-CM

## 2023-03-02 PROCEDURE — 99213 OFFICE O/P EST LOW 20 MIN: CPT

## 2023-03-02 PROCEDURE — 73562 X-RAY EXAM OF KNEE 3: CPT | Performed by: EMERGENCY MEDICINE

## 2023-03-02 PROCEDURE — 99203 OFFICE O/P NEW LOW 30 MIN: CPT

## 2023-03-02 RX ORDER — NAPROXEN 500 MG/1
500 TABLET ORAL 2 TIMES DAILY PRN
Qty: 15 TABLET | Refills: 0 | Status: SHIPPED | OUTPATIENT
Start: 2023-03-02 | End: 2023-03-02

## 2023-03-02 RX ORDER — NAPROXEN 500 MG/1
500 TABLET ORAL 2 TIMES DAILY PRN
Qty: 15 TABLET | Refills: 0 | Status: SHIPPED | OUTPATIENT
Start: 2023-03-02

## 2023-03-03 ENCOUNTER — TELEPHONE (OUTPATIENT)
Dept: ORTHOPEDICS CLINIC | Facility: CLINIC | Age: 73
End: 2023-03-03

## 2023-03-03 NOTE — TELEPHONE ENCOUNTER
Imaging was completed for this patient for a RT KNEE, but it needs to be reviewed to see if additional imaging is needed. If so, please enter the appropriate RX and let the patient know to come in before their appointment to complete the additional imaging. Thank you!     Future Appointments   Date Time Provider Bubba Renteria   3/15/2023  2:30 PM [id number]   3/31/2023  3:00 PM Mishel Cabrera MD [facility]   5/13/2023 10:30 AM Brendon Yan MD [id number] [facility]   11/30/2023  4:00 PM Jayy Hopper [facility]   1/16/2024  4:30 PM Puneet Alford MD [address] [facility]

## 2023-03-14 ENCOUNTER — TELEPHONE (OUTPATIENT)
Dept: INTERNAL MEDICINE CLINIC | Facility: CLINIC | Age: 73
End: 2023-03-14

## 2023-03-15 ENCOUNTER — OFFICE VISIT (OUTPATIENT)
Dept: ORTHOPEDICS CLINIC | Facility: CLINIC | Age: 73
End: 2023-03-15
Payer: MEDICARE

## 2023-03-15 VITALS — WEIGHT: 147 LBS | BODY MASS INDEX: 26.05 KG/M2 | HEIGHT: 63 IN

## 2023-03-15 DIAGNOSIS — M17.11 PRIMARY OSTEOARTHRITIS OF RIGHT KNEE: Primary | ICD-10-CM

## 2023-03-15 DIAGNOSIS — M25.461 EFFUSION, RIGHT KNEE: ICD-10-CM

## 2023-03-15 DIAGNOSIS — M25.00 HEMARTHROSIS: ICD-10-CM

## 2023-03-15 PROCEDURE — 3008F BODY MASS INDEX DOCD: CPT | Performed by: PHYSICIAN ASSISTANT

## 2023-03-15 PROCEDURE — 20610 DRAIN/INJ JOINT/BURSA W/O US: CPT | Performed by: PHYSICIAN ASSISTANT

## 2023-03-15 PROCEDURE — 1125F AMNT PAIN NOTED PAIN PRSNT: CPT | Performed by: PHYSICIAN ASSISTANT

## 2023-03-15 PROCEDURE — 99204 OFFICE O/P NEW MOD 45 MIN: CPT | Performed by: PHYSICIAN ASSISTANT

## 2023-03-15 NOTE — PROCEDURES
Right Knee Intra-articular Injection    Name: Sharee Biswas   MRN: [id number]  Date: 3/15/2023     Clinical Indications:   Right knee effusion. After informed consent, the injection site was marked, sterilized with topical chlorhexidine antiseptic, and locally anesthetized with skin refrigerant. The patient was situation in a comfortable position. Using sterile technique: 40mL of bloody fluid was aspirated utilizing an 18 gauge needle. A band-aid was applied. The patient tolerated the procedure well. Disposition: For [facility] once authorized. AVI Chambers, FARHAT Orthopedic Surgery / Sports Medicine Specialist  [facility]  [address], [address], [address]   [email or web address]  [email or web address]  t: [phone]  o: [phone]  f: [phone]          This note was dictated using Dragon software. While it was briefly proofread prior to completion, some grammatical, spelling, and word choice errors due to dictation may still occur.

## 2023-03-16 NOTE — TELEPHONE ENCOUNTER
Dr. Keara Estrada,    **FMLA for pt's daughter to transport pt to and from [facility]**      Please sign off on form: FMLA  -Highlight the patient and hit \"Chart\" button. -In Chart Review, w/in the Encounter tab - click 1 time on the Telephone call encounter for 3/14/23 Scroll down the telephone encounter.  -Click \"scan on\" blue Hyperlink under \"Media\" heading for FMLA Dr. Keara Estrada 3/16/23 w/in the telephone enc.  -Click on Acknowledge button at the bottom right corner and left-click onto image, signature stamp appears and drag signature to Provider signature line. Stamp will turn blue. Close window.      Thank you,  [facility]

## 2023-03-16 NOTE — TELEPHONE ENCOUNTER
Dr. Serjio Mena,    PT's daughter is seeking intermittent FMLA to be able to take pt to doctor appts. Due to pt having issues with her knees daughter needs to transport her to and from appts. Patient is requesting 1-4 appts per month. Do you support?       Thank you,  [facility]

## 2023-03-17 ENCOUNTER — TELEPHONE (OUTPATIENT)
Dept: ORTHOPEDICS CLINIC | Facility: CLINIC | Age: 73
End: 2023-03-17

## 2023-03-17 NOTE — TELEPHONE ENCOUNTER
Forms completed and signed. Unable to fax as no auth on file. PLEASE DONT' FAX FORMS UNTIL WE HAVE RECEIVED FANNY. Spoke with pt and advised Lita Appl is needed.

## 2023-03-17 NOTE — TELEPHONE ENCOUNTER
Lvm for patient to call back and schedule Injection, with Sincer at the [facility] location. Patient needs to be scheduled 4 weeks ahead to assure that the medication will be there for the appt. Please forward TE back to me with Date,Time and Location.     Channing Lopez

## 2023-03-22 NOTE — TELEPHONE ENCOUNTER
2nd attempt, LMOM to call back and schedule her Gel Injection with Sincer Girish goss per previous message.

## 2023-03-31 ENCOUNTER — OFFICE VISIT (OUTPATIENT)
Dept: ORTHOPEDICS CLINIC | Facility: CLINIC | Age: 73
End: 2023-03-31

## 2023-03-31 VITALS — SYSTOLIC BLOOD PRESSURE: 160 MMHG | HEART RATE: 75 BPM | DIASTOLIC BLOOD PRESSURE: 85 MMHG

## 2023-03-31 DIAGNOSIS — M25.461 EFFUSION, RIGHT KNEE: Primary | ICD-10-CM

## 2023-03-31 LAB
BASOPHILS NFR SNV: 0 %
EOSINOPHIL NFR SNV: 1 %
LYMPHOCYTES NFR SNV: 11 %
MONOS+MACROS NFR SNV: 9 %
NEUTROPHILS NFR FLD: 79 %
TOTAL CELLS COUNTED FLD: 100
TOTAL CELLS COUNTED SNV: 3454 /CUMM (ref 0–200)
WBC # SNV: 3454 /CUMM

## 2023-03-31 PROCEDURE — 20610 DRAIN/INJ JOINT/BURSA W/O US: CPT | Performed by: ORTHOPAEDIC SURGERY

## 2023-03-31 PROCEDURE — 99204 OFFICE O/P NEW MOD 45 MIN: CPT | Performed by: ORTHOPAEDIC SURGERY

## 2023-03-31 PROCEDURE — 1125F AMNT PAIN NOTED PAIN PRSNT: CPT | Performed by: ORTHOPAEDIC SURGERY

## 2023-03-31 PROCEDURE — 3077F SYST BP >= 140 MM HG: CPT | Performed by: ORTHOPAEDIC SURGERY

## 2023-03-31 PROCEDURE — 3079F DIAST BP 80-89 MM HG: CPT | Performed by: ORTHOPAEDIC SURGERY

## 2023-03-31 RX ORDER — LIDOCAINE 50 MG/G
1 PATCH TOPICAL
Qty: 30 PATCH | Refills: 0 | Status: SHIPPED | OUTPATIENT
Start: 2023-03-31 | End: 2023-04-30

## 2023-03-31 RX ORDER — TRIAMCINOLONE ACETONIDE 40 MG/ML
40 INJECTION, SUSPENSION INTRA-ARTICULAR; INTRAMUSCULAR ONCE
Status: SHIPPED | OUTPATIENT
Start: 2023-03-31

## 2023-04-07 NOTE — TELEPHONE ENCOUNTER
Pt seen on 3/31 and rx'd lidocaine patches for 30 days. Pharmacy requesting 90 day supply. Do you approve?

## 2023-04-10 RX ORDER — LIDOCAINE 50 MG/G
PATCH TOPICAL
Qty: 90 PATCH | Refills: 0 | Status: SHIPPED | OUTPATIENT
Start: 2023-04-10

## 2023-04-12 ENCOUNTER — HOSPITAL ENCOUNTER (OUTPATIENT)
Dept: MRI IMAGING | Age: 73
Discharge: HOME | End: 2023-04-12
Attending: ORTHOPAEDIC SURGERY
Payer: MEDICARE

## 2023-04-12 DIAGNOSIS — M25.461 EFFUSION, RIGHT KNEE: ICD-10-CM

## 2023-04-12 PROCEDURE — 73721 MRI JNT OF LWR EXTRE W/O DYE: CPT | Performed by: ORTHOPAEDIC SURGERY

## 2023-04-13 NOTE — TELEPHONE ENCOUNTER
Returned pts call. Pt would like FMLA forms for daughter to be uploaded to  [address] [place] [address] [address]. Request completed.

## 2023-05-10 ENCOUNTER — LAB ENCOUNTER (OUTPATIENT)
Dept: LAB | Facility: HOSPITAL | Age: 73
End: 2023-05-10
Attending: SURGERY
Payer: MEDICARE

## 2023-05-10 ENCOUNTER — OFFICE VISIT (OUTPATIENT)
Dept: RHEUMATOLOGY | Facility: CLINIC | Age: 73
End: 2023-05-10

## 2023-05-10 VITALS
BODY MASS INDEX: 26.05 KG/M2 | WEIGHT: 147 LBS | HEART RATE: 80 BPM | DIASTOLIC BLOOD PRESSURE: 78 MMHG | HEIGHT: 63 IN | SYSTOLIC BLOOD PRESSURE: 150 MMHG

## 2023-05-10 DIAGNOSIS — G89.29 CHRONIC PAIN OF RIGHT KNEE: Primary | ICD-10-CM

## 2023-05-10 DIAGNOSIS — M25.561 CHRONIC PAIN OF RIGHT KNEE: Primary | ICD-10-CM

## 2023-05-10 LAB
CRP SERPL-MCNC: <0.29 MG/DL (ref ?–0.3)
ERYTHROCYTE [SEDIMENTATION RATE] IN BLOOD: 19 MM/HR
RHEUMATOID FACT SERPL-ACNC: <10 IU/ML (ref ?–15)

## 2023-05-10 PROCEDURE — 3077F SYST BP >= 140 MM HG: CPT | Performed by: INTERNAL MEDICINE

## 2023-05-10 PROCEDURE — 1125F AMNT PAIN NOTED PAIN PRSNT: CPT | Performed by: INTERNAL MEDICINE

## 2023-05-10 PROCEDURE — 1159F MED LIST DOCD IN RCRD: CPT | Performed by: INTERNAL MEDICINE

## 2023-05-10 PROCEDURE — 86431 RHEUMATOID FACTOR QUANT: CPT | Performed by: INTERNAL MEDICINE

## 2023-05-10 PROCEDURE — 1160F RVW MEDS BY RX/DR IN RCRD: CPT | Performed by: INTERNAL MEDICINE

## 2023-05-10 PROCEDURE — 86140 C-REACTIVE PROTEIN: CPT | Performed by: INTERNAL MEDICINE

## 2023-05-10 PROCEDURE — 36415 COLL VENOUS BLD VENIPUNCTURE: CPT | Performed by: INTERNAL MEDICINE

## 2023-05-10 PROCEDURE — 99214 OFFICE O/P EST MOD 30 MIN: CPT | Performed by: INTERNAL MEDICINE

## 2023-05-10 PROCEDURE — 86200 CCP ANTIBODY: CPT | Performed by: INTERNAL MEDICINE

## 2023-05-10 PROCEDURE — 85652 RBC SED RATE AUTOMATED: CPT | Performed by: INTERNAL MEDICINE

## 2023-05-10 PROCEDURE — 3078F DIAST BP <80 MM HG: CPT | Performed by: INTERNAL MEDICINE

## 2023-05-10 PROCEDURE — 3008F BODY MASS INDEX DOCD: CPT | Performed by: INTERNAL MEDICINE

## 2023-05-10 RX ORDER — METHYLPREDNISOLONE 4 MG/1
TABLET ORAL
Qty: 1 EACH | Refills: 0 | Status: SHIPPED | OUTPATIENT
Start: 2023-05-10

## 2023-05-10 NOTE — PATIENT INSTRUCTIONS
You were seen for right knee pain and swelling  Fluid was bloody and showed some inflammation  Lets evaluate to see if anything autoimmune is going on  Blood work today  Try the Medrol Dosepak, steroids  Once I get the results I will let you know the neck steps, may have to talk to the orthopedic doctor to  I will also send a message to your primary care discussing may be to switch the Plavix

## 2023-05-12 LAB — CCP IGG SERPL-ACNC: 4.3 U/ML (ref 0–6.9)

## 2023-05-13 ENCOUNTER — OFFICE VISIT (OUTPATIENT)
Dept: INTERNAL MEDICINE CLINIC | Facility: CLINIC | Age: 73
End: 2023-05-13

## 2023-05-13 VITALS
TEMPERATURE: 98 F | OXYGEN SATURATION: 99 % | SYSTOLIC BLOOD PRESSURE: 120 MMHG | RESPIRATION RATE: 16 BRPM | WEIGHT: 145 LBS | HEIGHT: 63 IN | HEART RATE: 64 BPM | BODY MASS INDEX: 25.69 KG/M2 | DIASTOLIC BLOOD PRESSURE: 66 MMHG

## 2023-05-13 DIAGNOSIS — Z00.00 ENCOUNTER FOR ANNUAL HEALTH EXAMINATION: ICD-10-CM

## 2023-05-13 DIAGNOSIS — M25.562 CHRONIC PAIN OF BOTH KNEES: ICD-10-CM

## 2023-05-13 DIAGNOSIS — R53.1 RIGHT SIDED WEAKNESS: ICD-10-CM

## 2023-05-13 DIAGNOSIS — I10 ESSENTIAL HYPERTENSION: ICD-10-CM

## 2023-05-13 DIAGNOSIS — R47.1 DYSARTHRIA: ICD-10-CM

## 2023-05-13 DIAGNOSIS — D64.9 ANEMIA, UNSPECIFIED TYPE: ICD-10-CM

## 2023-05-13 DIAGNOSIS — M17.11 PRIMARY OSTEOARTHRITIS OF RIGHT KNEE: ICD-10-CM

## 2023-05-13 DIAGNOSIS — E78.5 HYPERLIPIDEMIA, UNSPECIFIED HYPERLIPIDEMIA TYPE: ICD-10-CM

## 2023-05-13 DIAGNOSIS — M25.061 HEMARTHROSIS OF RIGHT KNEE: ICD-10-CM

## 2023-05-13 DIAGNOSIS — G89.29 CHRONIC PAIN OF BOTH KNEES: ICD-10-CM

## 2023-05-13 DIAGNOSIS — H25.13 AGE-RELATED NUCLEAR CATARACT OF BOTH EYES: ICD-10-CM

## 2023-05-13 DIAGNOSIS — I50.32 CHRONIC DIASTOLIC CONGESTIVE HEART FAILURE, NYHA CLASS 1 (HCC): ICD-10-CM

## 2023-05-13 DIAGNOSIS — I63.50 CEREBRAL ARTERY OCCLUSION WITH CEREBRAL INFARCTION (HCC): ICD-10-CM

## 2023-05-13 DIAGNOSIS — Z00.00 MEDICARE ANNUAL WELLNESS VISIT, SUBSEQUENT: Primary | ICD-10-CM

## 2023-05-13 DIAGNOSIS — I69.398 ABNORMALITY OF GAIT AS LATE EFFECT OF CEREBROVASCULAR ACCIDENT (CVA): ICD-10-CM

## 2023-05-13 DIAGNOSIS — M25.561 CHRONIC PAIN OF BOTH KNEES: ICD-10-CM

## 2023-05-13 DIAGNOSIS — Z78.0 POSTMENOPAUSAL: ICD-10-CM

## 2023-05-13 DIAGNOSIS — I65.23 CAROTID ARTERY PLAQUE, BILATERAL: ICD-10-CM

## 2023-05-13 DIAGNOSIS — E11.9 ENCOUNTER FOR DIABETIC FOOT EXAM (HCC): ICD-10-CM

## 2023-05-13 DIAGNOSIS — E11.40 TYPE 2 DIABETES MELLITUS WITH DIABETIC NEUROPATHY, WITHOUT LONG-TERM CURRENT USE OF INSULIN (HCC): ICD-10-CM

## 2023-05-13 DIAGNOSIS — R26.9 ABNORMALITY OF GAIT AS LATE EFFECT OF CEREBROVASCULAR ACCIDENT (CVA): ICD-10-CM

## 2023-05-13 DIAGNOSIS — I63.9 CEREBROVASCULAR ACCIDENT (CVA), UNSPECIFIED MECHANISM (HCC): ICD-10-CM

## 2023-05-13 PROBLEM — H43.393 FLOATER, VITREOUS, BILATERAL: Status: RESOLVED | Noted: 2019-04-24 | Resolved: 2023-05-13

## 2023-05-13 LAB
ALBUMIN SERPL-MCNC: 4.1 G/DL (ref 3.4–5)
ALBUMIN/GLOB SERPL: 1 {RATIO} (ref 1–2)
ALP LIVER SERPL-CCNC: 52 U/L
ALT SERPL-CCNC: 32 U/L
ANION GAP SERPL CALC-SCNC: 4 MMOL/L (ref 0–18)
AST SERPL-CCNC: 20 U/L (ref 15–37)
BASOPHILS # BLD AUTO: 0.01 X10(3) UL (ref 0–0.2)
BASOPHILS NFR BLD AUTO: 0.2 %
BILIRUB SERPL-MCNC: 0.5 MG/DL (ref 0.1–2)
BILIRUB UR QL: NEGATIVE
BUN BLD-MCNC: 19 MG/DL (ref 7–18)
BUN/CREAT SERPL: 22.6 (ref 10–20)
CALCIUM BLD-MCNC: 9.9 MG/DL (ref 8.5–10.1)
CHLORIDE SERPL-SCNC: 105 MMOL/L (ref 98–112)
CHOLEST SERPL-MCNC: 129 MG/DL (ref ?–200)
CLARITY UR: CLEAR
CO2 SERPL-SCNC: 29 MMOL/L (ref 21–32)
CREAT BLD-MCNC: 0.84 MG/DL
CREAT UR-SCNC: 121 MG/DL
DEPRECATED RDW RBC AUTO: 47.1 FL (ref 35.1–46.3)
EOSINOPHIL # BLD AUTO: 0.02 X10(3) UL (ref 0–0.7)
EOSINOPHIL NFR BLD AUTO: 0.3 %
ERYTHROCYTE [DISTWIDTH] IN BLOOD BY AUTOMATED COUNT: 15.5 % (ref 11–15)
EST. AVERAGE GLUCOSE BLD GHB EST-MCNC: 157 MG/DL (ref 68–126)
FASTING PATIENT LIPID ANSWER: YES
FASTING STATUS PATIENT QL REPORTED: YES
GFR SERPLBLD BASED ON 1.73 SQ M-ARVRAT: 74 ML/MIN/1.73M2 (ref 60–?)
GLOBULIN PLAS-MCNC: 4 G/DL (ref 2.8–4.4)
GLUCOSE BLD-MCNC: 145 MG/DL (ref 70–99)
GLUCOSE UR-MCNC: NORMAL MG/DL
HBA1C MFR BLD: 7.1 % (ref ?–5.7)
HCT VFR BLD AUTO: 39.6 %
HDLC SERPL-MCNC: 79 MG/DL (ref 40–59)
HGB BLD-MCNC: 11.9 G/DL
HGB UR QL STRIP.AUTO: NEGATIVE
IMM GRANULOCYTES # BLD AUTO: 0.02 X10(3) UL (ref 0–1)
IMM GRANULOCYTES NFR BLD: 0.3 %
KETONES UR-MCNC: NEGATIVE MG/DL
LDLC SERPL CALC-MCNC: 41 MG/DL (ref ?–100)
LEUKOCYTE ESTERASE UR QL STRIP.AUTO: 25
LYMPHOCYTES # BLD AUTO: 1.91 X10(3) UL (ref 1–4)
LYMPHOCYTES NFR BLD AUTO: 32.6 %
MCH RBC QN AUTO: 25.1 PG (ref 26–34)
MCHC RBC AUTO-ENTMCNC: 30.1 G/DL (ref 31–37)
MCV RBC AUTO: 83.4 FL
MICROALBUMIN UR-MCNC: 1.14 MG/DL
MICROALBUMIN/CREAT 24H UR-RTO: 9.4 UG/MG (ref ?–30)
MONOCYTES # BLD AUTO: 0.34 X10(3) UL (ref 0.1–1)
MONOCYTES NFR BLD AUTO: 5.8 %
NEUTROPHILS # BLD AUTO: 3.55 X10 (3) UL (ref 1.5–7.7)
NEUTROPHILS # BLD AUTO: 3.55 X10(3) UL (ref 1.5–7.7)
NEUTROPHILS NFR BLD AUTO: 60.8 %
NITRITE UR QL STRIP.AUTO: NEGATIVE
NONHDLC SERPL-MCNC: 50 MG/DL (ref ?–130)
OSMOLALITY SERPL CALC.SUM OF ELEC: 291 MOSM/KG (ref 275–295)
PH UR: 5 [PH] (ref 5–8)
PLATELET # BLD AUTO: 285 10(3)UL (ref 150–450)
POTASSIUM SERPL-SCNC: 3.6 MMOL/L (ref 3.5–5.1)
PROT SERPL-MCNC: 8.1 G/DL (ref 6.4–8.2)
PROT UR-MCNC: NEGATIVE MG/DL
RBC # BLD AUTO: 4.75 X10(6)UL
SODIUM SERPL-SCNC: 138 MMOL/L (ref 136–145)
SP GR UR STRIP: 1.02 (ref 1–1.03)
TRIGL SERPL-MCNC: 34 MG/DL (ref 30–149)
TSI SER-ACNC: 0.88 MIU/ML (ref 0.36–3.74)
UROBILINOGEN UR STRIP-ACNC: NORMAL
VLDLC SERPL CALC-MCNC: 5 MG/DL (ref 0–30)
WBC # BLD AUTO: 5.9 X10(3) UL (ref 4–11)

## 2023-05-13 PROCEDURE — 3074F SYST BP LT 130 MM HG: CPT | Performed by: INTERNAL MEDICINE

## 2023-05-13 PROCEDURE — 1170F FXNL STATUS ASSESSED: CPT | Performed by: INTERNAL MEDICINE

## 2023-05-13 PROCEDURE — 3051F HG A1C>EQUAL 7.0%<8.0%: CPT | Performed by: INTERNAL MEDICINE

## 2023-05-13 PROCEDURE — 36415 COLL VENOUS BLD VENIPUNCTURE: CPT | Performed by: INTERNAL MEDICINE

## 2023-05-13 PROCEDURE — 3078F DIAST BP <80 MM HG: CPT | Performed by: INTERNAL MEDICINE

## 2023-05-13 PROCEDURE — 1126F AMNT PAIN NOTED NONE PRSNT: CPT | Performed by: INTERNAL MEDICINE

## 2023-05-13 PROCEDURE — 3061F NEG MICROALBUMINURIA REV: CPT | Performed by: INTERNAL MEDICINE

## 2023-05-13 PROCEDURE — 3008F BODY MASS INDEX DOCD: CPT | Performed by: INTERNAL MEDICINE

## 2023-05-13 PROCEDURE — 1159F MED LIST DOCD IN RCRD: CPT | Performed by: INTERNAL MEDICINE

## 2023-05-13 RX ORDER — PYRAZINAMIDE 500 MG/1
1 TABLET ORAL EVERY 12 HOURS PRN
Qty: 30 TABLET | Refills: 0 | Status: SHIPPED | OUTPATIENT
Start: 2023-05-13

## 2023-05-13 RX ORDER — GABAPENTIN 100 MG/1
100 CAPSULE ORAL NIGHTLY
Qty: 30 CAPSULE | Refills: 2 | Status: SHIPPED | OUTPATIENT
Start: 2023-05-13

## 2023-05-15 ENCOUNTER — TELEPHONE (OUTPATIENT)
Dept: INTERNAL MEDICINE CLINIC | Facility: CLINIC | Age: 73
End: 2023-05-15

## 2023-05-15 ENCOUNTER — TELEPHONE (OUTPATIENT)
Dept: CASE MANAGEMENT | Age: 73
End: 2023-05-15

## 2023-05-15 DIAGNOSIS — M17.0 PRIMARY OSTEOARTHRITIS OF BOTH KNEES: Primary | ICD-10-CM

## 2023-05-15 LAB
DEPRECATED HBV CORE AB SER IA-ACNC: 22 NG/ML
IRON SATN MFR SERPL: 16 %
IRON SERPL-MCNC: 74 UG/DL
TIBC SERPL-MCNC: 457 UG/DL (ref 240–450)
TRANSFERRIN SERPL-MCNC: 307 MG/DL (ref 200–360)

## 2023-05-15 NOTE — TELEPHONE ENCOUNTER
Dr. John Jo,     Patient is requesting a referral for follow up visits with Dr. Hector Romano. Pended referral please review diagnosis and sign off if you agree. Thank you.   Victoriano Queen

## 2023-05-15 NOTE — TELEPHONE ENCOUNTER
----- Message from Girma Duran MD sent at 5/14/2023 11:43 AM CDT -----  Can you cll pt an ddaughter and tell her to make a follow up appointment with Dr Kaiden Gomez. ----- Message -----  From: Alisson Antonio MD  Sent: 5/13/2023   2:22 PM CDT  To: Girma Duran MD    She was supposed to schedule a follow-up appointment with me to review the results of her MRI. Apparently she didn't do that. Her inflammatory markers are all negative. 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 A repeat aspiration of the knee and cortisone injection might help her. If that's ineffective, her alternatives would be pain management vs consideration of total knee arthroplasty. ----- Message -----  From: Salma Joseph MD  Sent: 5/13/2023  12:48 PM CDT  To: Iliana King MD    Hi I saw your note for this patient she had an MRI done and assessment was never given the results  I will discuss with neurology about safety of stopping the Plavix  Anything else can be done in the meantime for the pain?   We will give her some pain medication Tylenol with codeine

## 2023-05-15 NOTE — TELEPHONE ENCOUNTER
Also let patient know that I contacted Dr. Zara Kearns and he said he will see soone let us know if any trouble getting in

## 2023-05-15 NOTE — TELEPHONE ENCOUNTER
Spoke to pts daughter.    Aware referral is in the system and if pain is worse, they may see her sooner

## 2023-05-15 NOTE — TELEPHONE ENCOUNTER
After verifying patients FANNY release, spoke to patient's daughter and the pt in regards to drs msg below and about order for US of Carotid. Both state understanding, deny having any further questions.      Just RAMOS

## 2023-05-20 ENCOUNTER — LAB ENCOUNTER (OUTPATIENT)
Dept: LAB | Age: 73
End: 2023-05-20
Attending: INTERNAL MEDICINE
Payer: MEDICARE

## 2023-05-20 DIAGNOSIS — D64.9 ANEMIA, UNSPECIFIED TYPE: ICD-10-CM

## 2023-05-20 LAB
FOLATE SERPL-MCNC: 10.5 NG/ML (ref 8.7–?)
VIT B12 SERPL-MCNC: 521 PG/ML (ref 193–986)

## 2023-05-20 PROCEDURE — 36415 COLL VENOUS BLD VENIPUNCTURE: CPT

## 2023-05-20 PROCEDURE — 83021 HEMOGLOBIN CHROMOTOGRAPHY: CPT

## 2023-05-20 PROCEDURE — 82607 VITAMIN B-12: CPT

## 2023-05-20 PROCEDURE — 82746 ASSAY OF FOLIC ACID SERUM: CPT

## 2023-05-20 PROCEDURE — 83020 HEMOGLOBIN ELECTROPHORESIS: CPT

## 2023-05-23 LAB
HGB A2 MFR BLD: 2.9 % (ref 1.5–3.5)
HGB PNL BLD ELPH: 97.1 % (ref 95.5–100)

## 2023-05-23 RX ORDER — ISOPROPYL ALCOHOL 70 ML/100ML
1 SWAB TOPICAL AS DIRECTED
Qty: 500 EACH | Refills: 0 | Status: SHIPPED | OUTPATIENT
Start: 2023-05-23

## 2023-05-23 RX ORDER — CALCIUM CITRATE/VITAMIN D3 200MG-6.25
TABLET ORAL
Qty: 100 STRIP | Refills: 3 | Status: SHIPPED | OUTPATIENT
Start: 2023-05-23

## 2023-05-23 RX ORDER — METOPROLOL SUCCINATE 25 MG/1
25 TABLET, EXTENDED RELEASE ORAL DAILY
Qty: 90 TABLET | Refills: 3 | Status: SHIPPED | OUTPATIENT
Start: 2023-05-23

## 2023-05-23 NOTE — TELEPHONE ENCOUNTER
Refill passed per [place] [email or web address], [facility] protocol.     Requested Prescriptions   Pending Prescriptions Disp Refills    TRUE METRIX BLOOD GLUCOSE TEST In Vitro Strip [place] Med Name: TRUE METRIX SELF MONITORING BLOOD GLUCOSE STRIPS   Strip] 100 strip 3     Sig: TEST BLOOD SUGAR EVERY DAY       Diabetic Supplies Protocol Passed - 5/22/2023  4:58 PM        Passed - In person appointment or virtual visit in the past 12 mos or appointment in next 3 mos     Recent Outpatient Visits              1 week ago Medicare annual wellness visit, subsequent    [address], [address], [place]    Office Visit    1 week ago Chronic pain of right knee    [address], [address], Blanka Lagunas MD    Office Visit    1 month ago [facility]usion, right knee    [facility]    Office Visit    2 months ago Primary osteoarthritis of right knee    [address], [place], [place]    Office Visit    3 months ago Primary osteoarthritis of both Leandro Zhang, Shae Red MD    Office Visit          Future Appointments         Provider Department Appt Notes    In 2 days [facility]     In 3 weeks Monserrat Pepe MD [address], [address] [facility] rt knee f/up, new referral to follow, expires 7/68/39  informed of policy    In 3 months Mariia Erickson MD [address], [address], [facility]     In 6 months Oliviagypsy Ortega, [address], [address], [place] DM Brenda Lugo, [place] PT    In 7 months Renetta Muhammad MD [address], [address], [facility] EP/ 1 yr DM EE                 METFORMIN 850 MG Oral Tab [Pharmacy Med Name: METFORMIN HYDROCHLORIDE 850 MG Tablet] 270 tablet 1     Sig: TAKE 1 Ægissidu 8 DAILY       Diabetes Medication Protocol Passed - 5/22/2023  4:58 PM        Passed - Last A1C < 7.5 and within past 6 months     Lab Results   Component Value Date    A1C 7.1 (H) 05/13/2023               Passed - In person appointment or virtual visit in the past 6 mos or appointment in next 3 mos     Recent Outpatient Visits              1 week ago Medicare annual wellness visit, subsequent    [address], [address], Siena Cazares MD    Office Visit    1 week ago Chronic pain of right knee    [address], [address], Marah Lagunas MD    Office Visit    1 month ago Effusion, right knee    [address], [address], Jacquelyn Stuart MD    Office Visit    2 months ago Primary osteoarthritis of right knee    [address], [address], [place], [place]    Office Visit    3 months ago Primary osteoarthritis of both Alexandria Multani, [address], Caryl Harper MD    Office Visit          Future Appointments         Provider Department Appt Notes    In 2 days [facility]     In 3 weeks Anaya Tineo MD [address], [address] [facility] rt knee f/up, new referral to follow, expires 7/13/82  informed of policy    In 3 months Kenya Loja MD [address], [address], [place]     In 6 months Devan Rivas, [address], [address], [place], [place] PT    In 7 months Tra Ott MD [address], [address], [place] EP/ 1 yr DM EE               Passed - EGFRCR or GFRAA > 50     GFR Evaluation  EGFRCR: 74 , resulted on 5/13/2023            Passed - GFR in the past 12 months          METOPROLOL SUCCINATE ER 25 MG Oral Tablet 24 Yousif 70 [Pharmacy Med Name: METOPROLOL SUCCINATE ER 25 MG Tablet Extended Release 24 Hour] 90 tablet 1 Sig: TAKE 1 TABLET EVERY DAY       Hypertensive Medications Protocol Passed - 5/22/2023  4:58 PM        Passed - In person appointment in the past 12 or next 3 months     Recent Outpatient Visits              1 week ago Medicare annual wellness visit, subsequent    [address], [address], Negro Peterson MD    Office Visit    1 week ago Chronic pain of right knee    [address], [address], Thu Lagunas MD    Office Visit    1 month ago Effusion, right knee    [address], [address], [facility], Brynn Mulligan MD    Office Visit    2 months ago Primary osteoarthritis of right knee    [address], [address], [facility] [place], [place]    Office Visit    3 months ago Primary osteoarthritis of both knees    [address], [address], Negro Peterson MD    Office Visit          Future Appointments         Provider Department Appt Notes    In 2 days [facility]     In 3 weeks Jerry Burgos MD [address], [address] rt knee f/up, new referral to follow, expires 9/48/89  informed of policy    In 3 months Hitesh Hernandez MD [address], [address]     In 6 months [facility], [address], [address], [place] DM [place], [place] PT    In 7 months Bárbara Cunha MD [address], [address], [place] EP/ 1 yr DM EE               Passed - Last BP reading less than 140/90     BP Readings from Last 1 Encounters:  05/13/23 : 120/66                Passed - CMP or BMP in past 6 months     Recent Results (from the past 4392 hour(s))   COMP METABOLIC PANEL (14)    Collection Time: 05/13/23 11:27 AM   Result Value Ref Range    Glucose 145 (H) 70 - 99 mg/dL    Sodium 138 136 - 145 mmol/L    Potassium 3.6 3.5 - 5.1 mmol/L    Chloride 105 98 - 112 mmol/L    CO2 29.0 21.0 - 32.0 mmol/L    Anion Gap 4 0 - 18 mmol/L    BUN 19 (H) 7 - 18 mg/dL    Creatinine 0.84 0.55 - 1.02 mg/dL    BUN/CREA Ratio 22.6 (H) 10.0 - 20.0    Calcium, Total 9.9 8.5 - 10.1 mg/dL    Calculated Osmolality 291 275 - 295 mOsm/kg    eGFR-Cr 74 >=60 mL/min/1.73m2    ALT 32 13 - 56 U/L    AST 20 15 - 37 U/L    Alkaline Phosphatase 52 (L) 55 - 142 U/L    Bilirubin, Total 0.5 0.1 - 2.0 mg/dL    Total Protein 8.1 6.4 - 8.2 g/dL    Albumin 4.1 3.4 - 5.0 g/dL    Globulin  4.0 2.8 - 4.4 g/dL    A/G Ratio 1.0 1.0 - 2.0    Patient Fasting for CMP? Yes      *Note: Due to a large number of results and/or encounters for the requested time period, some results have not been displayed. A complete set of results can be found in Results Review.                  Passed - In person appointment or virtual visit in the past 6 months     Recent Outpatient Visits              1 week ago Medicare annual wellness visit, subsequent    Anita Daniel, [address], Levora Phalen, MD    Office Visit    1 week ago Chronic pain of right knee    Anita Daniel, [address], Zakiya Staples MD    Office Visit    1 month ago Effusion, right knee    [facility] [place] Demetra Queen MD    Office Visit    2 months ago Primary osteoarthritis of right knee    Anita Daniel, [address], Dennis [place]ard [place], [place]    Office Visit    3 months ago Primary osteoarthritis of both Tenny Horace, Levora Phalen, MD    Office Visit          Future Appointments         Provider Department Appt Notes    In 2 days [address] [facility]     In 3 weeks MD Anita Harvey, [address] rt knee f/up, new referral to follow, expires 0/04/65  informed of policy    In 3 months [facility], [address] [facility]     In 6 months Dominique Braden DPM [facility], [address], [place], [place] PT    In 7 months Mile Church MD [address], [address], [facility] EP/ 1 yr DM EE               Passed - EGFRCR or GFRAA > 50     GFR Evaluation  EGFRCR: 74 , resulted on 5/13/2023              DROPSAFE ALCOHOL PREP 70 % Does not apply Pads [Pharmacy Med Name: [address] [place] [address] 70 % Pad]  0     Sig: USES 5 TIMES A DAY       There is no refill protocol information for this order        Future Appointments         Provider Department Appt Notes    In 2 days [address] [place] [address]     In 3 weeks Diego Pathak MD [facility], [address], [place] rt knee f/up, new referral to follow, expires 9/52/87  informed of policy    In 3 months Nelly Epstein MD [address], [address], [facility]     In 6 months Dominique Braden DPM [address], [address], [place], [place] PT    In 7 months Mile Church MD [address], [address], [facility] EP/ 1 yr DM EE          Recent Outpatient Visits              1 week ago Eliecer Purcell annual wellness visit, subsequent    [address], [address], Anderson Cazares MD    Office Visit    1 week ago Chronic pain of right knee    [address], [address], Ulysses Meier, MD    Office Visit    1 month ago Effusion, right knee    Hoy Never, Sophie Jiménez MD    Office Visit    2 months ago Primary osteoarthritis of right knee    [address], [place], [place]    Office Visit    3 months ago Primary osteoarthritis of both Tomer Tan, [address] [facility] [address] [facility] [address], [place], Anderson Lama MD    Office Visit

## 2023-05-24 RX ORDER — GABAPENTIN 100 MG/1
100 CAPSULE ORAL NIGHTLY
Qty: 30 CAPSULE | Refills: 2 | Status: SHIPPED | OUTPATIENT
Start: 2023-05-24

## 2023-05-25 ENCOUNTER — HOSPITAL ENCOUNTER (OUTPATIENT)
Dept: ULTRASOUND IMAGING | Age: 73
Discharge: HOME | End: 2023-05-25
Attending: INTERNAL MEDICINE
Payer: MEDICARE

## 2023-05-25 DIAGNOSIS — I65.23 CAROTID ARTERY PLAQUE, BILATERAL: ICD-10-CM

## 2023-05-25 PROCEDURE — 93880 EXTRACRANIAL BILAT STUDY: CPT | Performed by: INTERNAL MEDICINE

## 2023-06-01 RX ORDER — DIPHENHYDRAMINE HCL 25 MG
TABLET ORAL
Qty: 1 KIT | Refills: 0 | Status: SHIPPED | OUTPATIENT
Start: 2023-06-01

## 2023-06-01 NOTE — TELEPHONE ENCOUNTER
Refill passed per [facility] protocol   Requested Prescriptions   Pending Prescriptions Disp Refills    TRUE METRIX AIR GLUCOSE METER w/Device Does not apply Kit [Pharmacy Med Name: TRUE METRIX AIR W/BLUETOOTH SMART w/Device  Kit] 1 kit 0     Sig: USE AS DIRECTED       Diabetic Supplies Protocol Passed - 5/31/2023 12:58 PM        Passed - In person appointment or virtual visit in the past 12 mos or appointment in next 3 mos     Recent Outpatient Visits              2 weeks ago Medicare annual wellness visit, subsequent    [address], [address], Amber Kiran MD    Office Visit    3 weeks ago Chronic pain of right knee    [address], [address], Michell Sharp MD    Office Visit    2 months ago Effusion, right knee    [facility] Erika Queen MD    Office Visit    2 months ago Primary osteoarthritis of right knee    [address], [address], [place] [facility] [place], [place]    Office Visit    3 months ago Primary osteoarthritis of both María Elena Rings, Amber Kiran MD    Office Visit          Future Appointments         Provider Department Appt Notes    In 1 week Neel Nagy MD [address], [address] [facility] rt knee f/up, new referral to follow, expires 6/51/62  informed of policy    In 2 months Carlota Douglas MD [address], [address], [employer]     In 6 months [facility], [facility] [address], [address], [place] DM [place], [place] PT    In 7 months Misha Larsen MD [address], [address], [employer] EP/ 1 yr DM

## 2023-06-14 ENCOUNTER — OFFICE VISIT (OUTPATIENT)
Dept: ORTHOPEDICS CLINIC | Facility: CLINIC | Age: 73
End: 2023-06-14

## 2023-06-14 ENCOUNTER — TELEPHONE (OUTPATIENT)
Dept: RHEUMATOLOGY | Facility: CLINIC | Age: 73
End: 2023-06-14

## 2023-06-14 VITALS — HEIGHT: 63 IN | BODY MASS INDEX: 25.69 KG/M2 | WEIGHT: 145 LBS

## 2023-06-14 DIAGNOSIS — M25.00 HEMARTHROSIS: Primary | ICD-10-CM

## 2023-06-14 PROCEDURE — 1159F MED LIST DOCD IN RCRD: CPT | Performed by: ORTHOPAEDIC SURGERY

## 2023-06-14 PROCEDURE — 1160F RVW MEDS BY RX/DR IN RCRD: CPT | Performed by: ORTHOPAEDIC SURGERY

## 2023-06-14 PROCEDURE — 1125F AMNT PAIN NOTED PAIN PRSNT: CPT | Performed by: ORTHOPAEDIC SURGERY

## 2023-06-14 PROCEDURE — 99214 OFFICE O/P EST MOD 30 MIN: CPT | Performed by: ORTHOPAEDIC SURGERY

## 2023-06-14 PROCEDURE — 3008F BODY MASS INDEX DOCD: CPT | Performed by: ORTHOPAEDIC SURGERY

## 2023-06-14 RX ORDER — METHYLPREDNISOLONE 4 MG/1
TABLET ORAL
Qty: 1 EACH | Refills: 0 | Status: SHIPPED | OUTPATIENT
Start: 2023-06-14

## 2023-07-03 ENCOUNTER — OFFICE VISIT (OUTPATIENT)
Dept: RHEUMATOLOGY | Facility: CLINIC | Age: 73
End: 2023-07-03

## 2023-07-03 VITALS
RESPIRATION RATE: 16 BRPM | DIASTOLIC BLOOD PRESSURE: 80 MMHG | SYSTOLIC BLOOD PRESSURE: 129 MMHG | WEIGHT: 145 LBS | HEART RATE: 74 BPM | BODY MASS INDEX: 25.69 KG/M2 | HEIGHT: 63 IN

## 2023-07-03 DIAGNOSIS — M25.561 CHRONIC PAIN OF RIGHT KNEE: Primary | ICD-10-CM

## 2023-07-03 DIAGNOSIS — G89.29 CHRONIC PAIN OF RIGHT KNEE: Primary | ICD-10-CM

## 2023-07-03 PROCEDURE — 3074F SYST BP LT 130 MM HG: CPT | Performed by: INTERNAL MEDICINE

## 2023-07-03 PROCEDURE — 3079F DIAST BP 80-89 MM HG: CPT | Performed by: INTERNAL MEDICINE

## 2023-07-03 PROCEDURE — 3008F BODY MASS INDEX DOCD: CPT | Performed by: INTERNAL MEDICINE

## 2023-07-03 PROCEDURE — 1159F MED LIST DOCD IN RCRD: CPT | Performed by: INTERNAL MEDICINE

## 2023-07-03 PROCEDURE — 1160F RVW MEDS BY RX/DR IN RCRD: CPT | Performed by: INTERNAL MEDICINE

## 2023-07-03 PROCEDURE — 99213 OFFICE O/P EST LOW 20 MIN: CPT | Performed by: INTERNAL MEDICINE

## 2023-07-03 PROCEDURE — 20610 DRAIN/INJ JOINT/BURSA W/O US: CPT | Performed by: INTERNAL MEDICINE

## 2023-07-03 PROCEDURE — 1125F AMNT PAIN NOTED PAIN PRSNT: CPT | Performed by: INTERNAL MEDICINE

## 2023-07-03 RX ORDER — TRIAMCINOLONE ACETONIDE 40 MG/ML
40 INJECTION, SUSPENSION INTRA-ARTICULAR; INTRAMUSCULAR ONCE
Status: COMPLETED | OUTPATIENT
Start: 2023-07-03 | End: 2023-07-03

## 2023-07-03 RX ADMIN — TRIAMCINOLONE ACETONIDE 40 MG: 40 INJECTION, SUSPENSION INTRA-ARTICULAR; INTRAMUSCULAR at 17:15:00

## 2023-07-03 NOTE — PROCEDURES
With paitent's consent, I injected pt's  R knee with 1ml lidocaine 1 % and 1 ml kenalog 40. It was done under sterile technique using iodine and alcohol swabs and ethyl chloride was used as an anaesthetic spray. Pt.  tolerated it well.   Right knee was also aspirated, approximately 10 cc of bloody fluid was removed

## 2023-07-03 NOTE — PATIENT INSTRUCTIONS
You are seen today for pain in your right knee  I injected your right knee with steroids  See if the physiatrist they can do an ultrasound-guided gel injection

## 2023-07-03 NOTE — PROGRESS NOTES
Mary Cash is a 68year old female. HPI:   Patient presents with: Follow - Up  Knee Pain: B/L but more  severe on Right      I had the pleasure of seeing Mary Cash on 7/3/2023 for follow up R knee pain. Current Medications:  Tylenol with codeine   Blood work:  R knee synovial fluid: cell count 3,454, >1 million RBC    Interval History, seen in 2021  This is a 69 yo F with hx of HTN, HLD, DM-2, CVA (2014) presents with left thumb pain. She has had left thumb pain for the past year but has worsened over the past couple of months. She does have a habit are noted on her PIP that is painful. Denies any pain at the base of her thumb. She has difficulty with flexion opening bottles with her left hand. Otherwise no pain or swelling her MCPs, PIPs or wrists. She also reports chronic right knee pain and swelling. Her PCP does give her cortisone injections every 6 months. It helps slightly. X-rays have showed moderate OA changes. She takes Tylenol arthritis as needed for her pain. Cannot take NSAIDs as she is on Plavix. Denies any other joint pain or swelling, rash, psoriasis, lower back pain or GI issues. 5/10/2023:  Presents for follow-up of right knee pain. She was last seen in 2021  She was getting cortisone injections by her PCP every 6 months for right knee pain. She had a cortisone injection in January 2023 by her PCP and afterwards developed right knee pain and swelling. She was seen by orthopedic in March and May both times fluid was drained and it was hemorrhagic. It was sent down for analysis showing inflammation and significant red blood cells. Contiues to have a lot of pain and swelling in her right knee, hard to ambulate  MRI was done showing moderate tricompartmental OA, moderate-sized effusion with moderate synovitis and some chronic degenerative tearing  She is on Plavix for history of stroke  She has some joint pain in her hands but not significant. No swelling. She does have some weakness in her right hand due to her stroke  No psoriasis    7/3/2023:  Presents for follow-up of right knee pain  Right knee was aspirated recently showing hemarthrosis. MRI of the knee also showed moderate OA and moderate-sized effusion with moderate synovitis and chronic degenerative tearing's  She was on Plavix for her history of stroke  She was seen by orthopedic recently who recommended nonsurgical treatment. She was given a medrol dose pack during the last visit which helped with the pain and swelling   Still has pain in the R knee and mild swelling  Has a lot of pain in the R knee              HISTORY:  Past Medical History:   Diagnosis Date    Ametropia     Hyperopia    Diabetes ([facility] [address]     Lipid screening 5/29/2014    Numbness and tingling of leg     Other and unspecified hyperlipidemia     Stroke [facility]) 1/2014    Type II or unspecified type diabetes mellitus without mention of complication, not stated as uncontrolled     Unspecified essential hypertension       Social Hx Reviewed   Family Hx Reviewed     Medications (Active prior to today's visit):  Current Outpatient Medications   Medication Sig Dispense Refill    Blood Glucose Monitoring Suppl (TRUE METRIX AIR GLUCOSE METER) w/Device Does not apply Kit USE AS DIRECTED 1 kit 0    gabapentin 100 MG Oral Cap Take 1 capsule (100 mg total) by mouth nightly. 30 capsule 2    Glucose Blood (TRUE METRIX BLOOD GLUCOSE TEST) In Vitro Strip TEST BLOOD SUGAR EVERY  strip 3    metFORMIN 850 MG Oral Tab Take 1 tablet (850 mg total) by mouth 3 (three) times daily. 270 tablet 3    metoprolol succinate ER 25 MG Oral Tablet 24 Hr Take 1 tablet (25 mg total) by mouth daily. 90 tablet 3    Alcohol Swabs (DROPSAFE ALCOHOL PREP) 70 % Does not apply Pads Apply 1 each topically As Directed. 500 each 0    Acetaminophen-Codeine (TYLENOL WITH CODEINE #3) 300-30 MG Oral Tab Take 1 tablet by mouth every 12 (twelve) hours as needed for Pain.  30 tablet 0 methylPREDNISolone 4 MG Oral Tablet Therapy Pack Take as directed on package. 1 each 0    LIDOCAINE 5 % External Patch APPLY 1 PATCH TOPICALLY TO THE SKIN EVERY 24 HOURS AS NEEDED 90 patch 0    naproxen 500 MG Oral Tab Take 1 tablet (500 mg total) by mouth 2 (two) times daily as needed. 15 tablet 0    atorvastatin 20 MG Oral Tab Take 1 tablet (20 mg total) by mouth daily. 90 tablet 1    LISINOPRIL-HYDROCHLOROTHIAZIDE 20-25 MG Oral Tab TAKE 1 TABLET EVERY DAY 90 tablet 1    CLOPIDOGREL 75 MG Oral Tab TAKE 1 TABLET EVERY DAY 90 tablet 1    amLODIPine 2.5 MG Oral Tab Take 1 tablet (2.5 mg total) by mouth daily. 30 tablet 2    TRUEPLUS LANCETS 33G Does not apply Misc USE TO CHECK GLUCOSE ONCE DAILY 100 each 3    Diclofenac Sodium 1 % External Gel Apply 2 g topically 4 (four) times daily. 1 each 0    Pyridoxine HCl (VITAMIN B-6 OR) Take by mouth. CALCIUM OR Take by mouth. Cholecalciferol (VITAMIN D-3 OR) Take by mouth. ONETOUCH DELICA LANCETS 34B Does not apply Misc use as directed twice daily 100 each 6    aspirin 81 MG Oral Tab EC TAKE 1 TABLET BY MOUTH EVERY DAY 30 tablet 2    melatonin 3 MG Oral Tab Take one tab at night 30 min before going to bed 30 tablet 0    methylPREDNISolone 4 MG Oral Tablet Therapy Pack Take as directed on package. (Patient not taking: Reported on 7/3/2023) 1 each 0    SHINGRIX 50 MCG/0.5ML Intramuscular Recon Susp  (Patient not taking: Reported on 7/3/2023)       . cmed  Allergies:  No Known Allergies      ROS:   All other ROS are negative. PHYSICAL EXAM:   GEN: AAOx3, NAD  HEENT: EOMI, PERRLA, no injection or icterus, oral mucosa moist, no oral lesions. No lymphadenopathy. No facial rash  CVS: RRR, no murmurs rubs or gallops. Equal 2+ distal pulses. LUNGS: CTAB, no increased work of breathing  ABDOMEN:  soft NT/ND, +BS, no HSM  SKIN: No rashes or skin lesions. No nail findings  MSK:  + [place] node on L thumb, TTP in PIP region.   No swelling or synovitis in MCPs, PIPs or wrists  R knee moderate swelling, TTP  NEURO: Cranial nerves II-XII intact grossly. 5/5 strength throughout in both upper and lower extremities, sensation intact. PSYCH: normal mood       LABS:     Component      Latest Ref Rng 3/31/2023   WBC Calculated, Synovial Fluid      /CUMM 3,454    Neutrophil Synovial      % 79    LYMPH SYNOVIAL FLUID      % 11    MON/MAC/HIST SYNOVIAL FLUID      % 9    EOSINOPHIL SYNOVIAL FLUID      % 1    BASOPHIL SYNOVIAL FLUID      % 0    TOTAL CELLS COUNTED 100    BODY FLUID COLOR Red    BODY FLUID CLARITY Bloody    TNC, Synovial Fluid      0 - 200 /CUMM 3,454 (H)    RBC Synovial      <1 /CUMM >1,000,000 (H)    BODY FLUID SOURCE Synovial          Imaging:     MRI R knee:  1. Moderate tricompartmental osteoarthritis of the knee most pronounced in the patellofemoral compartment. 2. Moderate size joint effusion with moderate synovitis likely related to arthritic change. 3. Complete loss of normal architecture involving the body and anterior horn of the lateral meniscus likely secondary to chronic degenerative tearing. ASSESSMENT/PLAN:     Right knee pain and swelling, hemarthrosis  - She had a cortisone injection in January 2023 and since then has had pain and swelling in her right knee. It was aspirated twice in March and April 2023 and both times it was hemorrhagic. She is on Plavix   - Synovial analysis was done showing inflammation and significant RBCs  - She has mild joint pain in her hands but no evidence of synovitis or swelling.  - Blood work showed negative RF, CCP  - R knee aspirated and fluid was hemorrhagic. Right knee was also injected with 1 cc of lidocaine and 40 mg of Kenalog in sterile fashion.     Pt will f/u as needed     Gilbert Jason MD  7/3/2023   4:20 PM

## 2023-07-25 ENCOUNTER — OFFICE VISIT (OUTPATIENT)
Dept: PHYSICAL MEDICINE AND REHAB | Facility: CLINIC | Age: 73
End: 2023-07-25
Payer: MEDICARE

## 2023-07-25 VITALS — BODY MASS INDEX: 26.58 KG/M2 | HEIGHT: 63 IN | WEIGHT: 150 LBS

## 2023-07-25 DIAGNOSIS — M17.11 PRIMARY OSTEOARTHRITIS OF RIGHT KNEE: Primary | ICD-10-CM

## 2023-07-25 DIAGNOSIS — E11.40 TYPE 2 DIABETES MELLITUS WITH DIABETIC NEUROPATHY, WITHOUT LONG-TERM CURRENT USE OF INSULIN (HCC): ICD-10-CM

## 2023-07-25 DIAGNOSIS — I69.351 SPASTIC HEMIPLEGIA OF RIGHT DOMINANT SIDE AS LATE EFFECT OF CEREBRAL INFARCTION (HCC): ICD-10-CM

## 2023-07-25 PROCEDURE — 99204 OFFICE O/P NEW MOD 45 MIN: CPT | Performed by: PHYSICAL MEDICINE & REHABILITATION

## 2023-07-25 PROCEDURE — 1159F MED LIST DOCD IN RCRD: CPT | Performed by: PHYSICAL MEDICINE & REHABILITATION

## 2023-07-25 PROCEDURE — 1125F AMNT PAIN NOTED PAIN PRSNT: CPT | Performed by: PHYSICAL MEDICINE & REHABILITATION

## 2023-07-25 PROCEDURE — 3008F BODY MASS INDEX DOCD: CPT | Performed by: PHYSICAL MEDICINE & REHABILITATION

## 2023-07-26 ENCOUNTER — TELEPHONE (OUTPATIENT)
Dept: PHYSICAL MEDICINE AND REHAB | Facility: CLINIC | Age: 73
End: 2023-07-26

## 2023-07-26 NOTE — TELEPHONE ENCOUNTER
Initiated authorization for Right knee injection with Synvisc x3 under ultrasound guidance CPT 20611x3 and [id number] dx:M17.11 with Gabrieljayjay Goldsmith at [address] #[phone].   Status: Synvisc pending  Referral Approved w/ authorization #[id number] valid 7/26/23-10/23/23

## 2023-07-27 NOTE — TELEPHONE ENCOUNTER
Received Denial from [facility] for Synvisc x3. Denial reason: [employer]'s preferred viscosupplements are Supartz FX, Durolane, Monovisc, Orthovisc or Synvisc One and do not require prior authorization. [employer] covers these drugs when criteria are met. The unmet criteria are:has tried or cannot use Orthovisc or Supartz FX.

## 2023-08-02 NOTE — TELEPHONE ENCOUNTER
Yuan Masters MD   to Me   Lets do Orthovisc x3 Cristal Foley. Thank you!     Initiated new authorization for Right Knee injection w/ Orthovisc x3 CPT [id number] with Jerri Montgomery at [facility]  Case #[id number]  Status: Approved-authorization is not required per health plan as it is a preferred drug-BUY[employer]BILL    LMTCB-to schedule Right Knee injection w/ Orthovisc x3

## 2023-08-03 NOTE — TELEPHONE ENCOUNTER
Patient scheduled 8/9/23 at 11:15a, 8/16/23 at 1:45p and 8/23/23 at 11:45a for Right Knee injection w/ Orthovisc x3 under US guidance

## 2023-08-09 ENCOUNTER — OFFICE VISIT (OUTPATIENT)
Dept: PHYSICAL MEDICINE AND REHAB | Facility: CLINIC | Age: 73
End: 2023-08-09
Payer: MEDICARE

## 2023-08-09 DIAGNOSIS — M25.461 EFFUSION OF RIGHT KNEE: ICD-10-CM

## 2023-08-09 DIAGNOSIS — E11.40 TYPE 2 DIABETES MELLITUS WITH DIABETIC NEUROPATHY, WITHOUT LONG-TERM CURRENT USE OF INSULIN (HCC): ICD-10-CM

## 2023-08-09 DIAGNOSIS — M17.11 PRIMARY OSTEOARTHRITIS OF RIGHT KNEE: Primary | ICD-10-CM

## 2023-08-09 LAB
BASOPHILS NFR SNV: 0 %
COLOR FLD: YELLOW
EOSINOPHIL NFR SNV: 1 %
LYMPHOCYTES NFR SNV: 3 %
MONOS+MACROS NFR SNV: 90 %
NEUTROPHILS NFR FLD: 6 %
RBC # FLD AUTO: 4156 /CUMM (ref ?–1)
TOTAL CELLS COUNTED FLD: 100
TOTAL CELLS COUNTED SNV: 95 /CUMM (ref 0–200)
WBC # SNV: 95 /CUMM

## 2023-08-09 PROCEDURE — 87205 SMEAR GRAM STAIN: CPT | Performed by: PHYSICAL MEDICINE & REHABILITATION

## 2023-08-09 PROCEDURE — 87070 CULTURE OTHR SPECIMN AEROBIC: CPT | Performed by: PHYSICAL MEDICINE & REHABILITATION

## 2023-08-09 PROCEDURE — 20611 DRAIN/INJ JOINT/BURSA W/US: CPT | Performed by: PHYSICAL MEDICINE & REHABILITATION

## 2023-08-14 NOTE — PROCEDURES
Knee injection with Orthovisc under ultrasound guidance, #1/3  Location: right  After discussing benefits and possible side effects, we proceeded with a knee injection. The patient was consented. The patient was placed in supine, and the lateral knee was palpated. The skin was sterilely prepped. Using a 13 MHz linear array transducer, ultrasound was used to visualize the lateral knee. The junction of the patella and distal femur was visualized. Under direct ultrasound visualization, a 22-gauge needle was introduced into the knee joint via a lateral approach. 20 ml of opaque mickey colored joint fluid with strands of synovium were aspirated. The fluid was sent for analysis. A total of 2 cc of Orthovisc was injected into the knee. Permanent images were saved. The patient tolerated the procedure well without adverse effects.

## 2023-08-16 ENCOUNTER — OFFICE VISIT (OUTPATIENT)
Dept: PHYSICAL MEDICINE AND REHAB | Facility: CLINIC | Age: 73
End: 2023-08-16
Payer: MEDICARE

## 2023-08-16 DIAGNOSIS — E11.40 TYPE 2 DIABETES MELLITUS WITH DIABETIC NEUROPATHY, WITHOUT LONG-TERM CURRENT USE OF INSULIN (HCC): ICD-10-CM

## 2023-08-16 DIAGNOSIS — M17.11 PRIMARY OSTEOARTHRITIS OF RIGHT KNEE: Primary | ICD-10-CM

## 2023-08-16 DIAGNOSIS — M25.461 EFFUSION OF RIGHT KNEE: ICD-10-CM

## 2023-08-16 PROCEDURE — 20610 DRAIN/INJ JOINT/BURSA W/O US: CPT | Performed by: PHYSICAL MEDICINE & REHABILITATION

## 2023-08-16 NOTE — PROCEDURES
Knee injection with Orthovisc under ultrasound guidance, #2/3  Location: right  After discussing benefits and possible side effects, we proceeded with a knee injection. The patient was consented. The patient was placed in supine, and the lateral knee was palpated. The skin was sterilely prepped. An 18-gauge needle was introduced into the knee joint via a lateral approach. 20 ml of opaque mickey colored joint fluid with strands of synovium were aspirated. A total of 2 cc of Orthovisc was injected into the knee. The patient tolerated the procedure well without adverse effects.

## 2023-08-23 ENCOUNTER — OFFICE VISIT (OUTPATIENT)
Dept: PHYSICAL MEDICINE AND REHAB | Facility: CLINIC | Age: 73
End: 2023-08-23
Payer: MEDICARE

## 2023-08-23 DIAGNOSIS — M17.11 PRIMARY OSTEOARTHRITIS OF RIGHT KNEE: Primary | ICD-10-CM

## 2023-08-23 DIAGNOSIS — M25.461 EFFUSION OF RIGHT KNEE: ICD-10-CM

## 2023-08-23 PROCEDURE — 20610 DRAIN/INJ JOINT/BURSA W/O US: CPT | Performed by: PHYSICAL MEDICINE & REHABILITATION

## 2023-08-26 ENCOUNTER — OFFICE VISIT (OUTPATIENT)
Dept: INTERNAL MEDICINE CLINIC | Facility: CLINIC | Age: 73
End: 2023-08-26

## 2023-08-26 VITALS
RESPIRATION RATE: 16 BRPM | SYSTOLIC BLOOD PRESSURE: 118 MMHG | OXYGEN SATURATION: 98 % | WEIGHT: 148 LBS | HEIGHT: 63 IN | DIASTOLIC BLOOD PRESSURE: 68 MMHG | TEMPERATURE: 98 F | HEART RATE: 70 BPM | BODY MASS INDEX: 26.22 KG/M2

## 2023-08-26 DIAGNOSIS — M17.0 PRIMARY OSTEOARTHRITIS OF BOTH KNEES: ICD-10-CM

## 2023-08-26 DIAGNOSIS — E11.40 TYPE 2 DIABETES MELLITUS WITH DIABETIC NEUROPATHY, WITHOUT LONG-TERM CURRENT USE OF INSULIN (HCC): Primary | ICD-10-CM

## 2023-08-26 DIAGNOSIS — I10 ESSENTIAL HYPERTENSION: ICD-10-CM

## 2023-08-26 DIAGNOSIS — D64.9 MILD ANEMIA: ICD-10-CM

## 2023-08-26 DIAGNOSIS — E78.5 HYPERLIPIDEMIA, UNSPECIFIED HYPERLIPIDEMIA TYPE: ICD-10-CM

## 2023-08-26 LAB
BASOPHILS # BLD AUTO: 0.01 X10(3) UL (ref 0–0.2)
BASOPHILS NFR BLD AUTO: 0.2 %
DEPRECATED RDW RBC AUTO: 50.4 FL (ref 35.1–46.3)
EOSINOPHIL # BLD AUTO: 0.04 X10(3) UL (ref 0–0.7)
EOSINOPHIL NFR BLD AUTO: 0.8 %
ERYTHROCYTE [DISTWIDTH] IN BLOOD BY AUTOMATED COUNT: 16.1 % (ref 11–15)
EST. AVERAGE GLUCOSE BLD GHB EST-MCNC: 154 MG/DL (ref 68–126)
HBA1C MFR BLD: 7 % (ref ?–5.7)
HCT VFR BLD AUTO: 36.9 %
HGB BLD-MCNC: 11 G/DL
IMM GRANULOCYTES # BLD AUTO: 0 X10(3) UL (ref 0–1)
IMM GRANULOCYTES NFR BLD: 0 %
LYMPHOCYTES # BLD AUTO: 1.58 X10(3) UL (ref 1–4)
LYMPHOCYTES NFR BLD AUTO: 32.6 %
MCH RBC QN AUTO: 25.2 PG (ref 26–34)
MCHC RBC AUTO-ENTMCNC: 29.8 G/DL (ref 31–37)
MCV RBC AUTO: 84.6 FL
MONOCYTES # BLD AUTO: 0.35 X10(3) UL (ref 0.1–1)
MONOCYTES NFR BLD AUTO: 7.2 %
NEUTROPHILS # BLD AUTO: 2.86 X10 (3) UL (ref 1.5–7.7)
NEUTROPHILS # BLD AUTO: 2.86 X10(3) UL (ref 1.5–7.7)
NEUTROPHILS NFR BLD AUTO: 59.2 %
PLATELET # BLD AUTO: 229 10(3)UL (ref 150–450)
RBC # BLD AUTO: 4.36 X10(6)UL
WBC # BLD AUTO: 4.8 X10(3) UL (ref 4–11)

## 2023-08-26 PROCEDURE — 1160F RVW MEDS BY RX/DR IN RCRD: CPT | Performed by: INTERNAL MEDICINE

## 2023-08-26 PROCEDURE — 36415 COLL VENOUS BLD VENIPUNCTURE: CPT | Performed by: INTERNAL MEDICINE

## 2023-08-26 PROCEDURE — 99214 OFFICE O/P EST MOD 30 MIN: CPT | Performed by: INTERNAL MEDICINE

## 2023-08-26 PROCEDURE — 3008F BODY MASS INDEX DOCD: CPT | Performed by: INTERNAL MEDICINE

## 2023-08-26 PROCEDURE — 1159F MED LIST DOCD IN RCRD: CPT | Performed by: INTERNAL MEDICINE

## 2023-08-26 PROCEDURE — 3078F DIAST BP <80 MM HG: CPT | Performed by: INTERNAL MEDICINE

## 2023-08-26 PROCEDURE — 1125F AMNT PAIN NOTED PAIN PRSNT: CPT | Performed by: INTERNAL MEDICINE

## 2023-08-26 PROCEDURE — 3074F SYST BP LT 130 MM HG: CPT | Performed by: INTERNAL MEDICINE

## 2023-08-26 PROCEDURE — 3051F HG A1C>EQUAL 7.0%<8.0%: CPT | Performed by: INTERNAL MEDICINE

## 2023-08-26 NOTE — PROGRESS NOTES
Subjective:     Patient ID: Vicente Alejandro is a 68year old female. HPI    Patient comes in for follow-up overall doing okay continues to have knee pain follows with physiatry has had gel shots which help last time labs A1c was slightly elevated she has been working on her diet also mild anemia patient has had colonoscopy few years ago will order occult blood test    History/Other:   Review of Systems   Constitutional: Negative. HENT: Negative. Eyes: Negative. Respiratory: Negative. Cardiovascular: Negative. Gastrointestinal: Negative. Genitourinary: Negative. Musculoskeletal:  Positive for arthralgias and gait problem. Skin: Negative. Psychiatric/Behavioral: Negative. Current Outpatient Medications   Medication Sig Dispense Refill    gabapentin 100 MG Oral Cap Take 1 capsule (100 mg total) by mouth nightly. 30 capsule 2    Glucose Blood (TRUE METRIX BLOOD GLUCOSE TEST) In Vitro Strip TEST BLOOD SUGAR EVERY  strip 3    metFORMIN 850 MG Oral Tab Take 1 tablet (850 mg total) by mouth 3 (three) times daily. 270 tablet 3    metoprolol succinate ER 25 MG Oral Tablet 24 Hr Take 1 tablet (25 mg total) by mouth daily. 90 tablet 3    Alcohol Swabs (DROPSAFE ALCOHOL PREP) 70 % Does not apply Pads Apply 1 each topically As Directed. 500 each 0    Acetaminophen-Codeine (TYLENOL WITH CODEINE #3) 300-30 MG Oral Tab Take 1 tablet by mouth every 12 (twelve) hours as needed for Pain. 30 tablet 0    methylPREDNISolone 4 MG Oral Tablet Therapy Pack Take as directed on package. 1 each 0    LIDOCAINE 5 % External Patch APPLY 1 PATCH TOPICALLY TO THE SKIN EVERY 24 HOURS AS NEEDED 90 patch 0    naproxen 500 MG Oral Tab Take 1 tablet (500 mg total) by mouth 2 (two) times daily as needed. 15 tablet 0    atorvastatin 20 MG Oral Tab Take 1 tablet (20 mg total) by mouth daily.  90 tablet 1    LISINOPRIL-HYDROCHLOROTHIAZIDE 20-25 MG Oral Tab TAKE 1 TABLET EVERY DAY 90 tablet 1    CLOPIDOGREL 75 MG Oral Tab TAKE 1 TABLET EVERY DAY 90 tablet 1    amLODIPine 2.5 MG Oral Tab Take 1 tablet (2.5 mg total) by mouth daily. 30 tablet 2    TRUEPLUS LANCETS 33G Does not apply Misc USE TO CHECK GLUCOSE ONCE DAILY 100 each 3    Diclofenac Sodium 1 % External Gel Apply 2 g topically 4 (four) times daily. 1 each 0    SHINGRIX 50 MCG/0.5ML Intramuscular Recon Susp       Pyridoxine HCl (VITAMIN B-6 OR) Take by mouth. CALCIUM OR Take by mouth. Cholecalciferol (VITAMIN D-3 OR) Take by mouth. ONETOUCH DELICA LANCETS 37W Does not apply Misc use as directed twice daily 100 each 6    aspirin 81 MG Oral Tab EC TAKE 1 TABLET BY MOUTH EVERY DAY 30 tablet 2    melatonin 3 MG Oral Tab Take one tab at night 30 min before going to bed 30 tablet 0    methylPREDNISolone 4 MG Oral Tablet Therapy Pack Take as directed on package.  (Patient not taking: Reported on 7/3/2023) 1 each 0    Blood Glucose Monitoring Suppl (TRUE METRIX AIR GLUCOSE METER) w/Device Does not apply Kit USE AS DIRECTED 1 kit 0     Allergies:No Known Allergies    Past Medical History:   Diagnosis Date    Ametropia     Hyperopia    Diabetes ([address]     Lipid screening 5/29/2014    Numbness and tingling of leg     Other and unspecified hyperlipidemia     Stroke ([address] 1/2014    Type II or unspecified type diabetes mellitus without mention of complication, not stated as uncontrolled     Unspecified essential hypertension       Past Surgical History:   Procedure Laterality Date    COLONOSCOPY      5 years ago date unknown    CYST ASPIRATION RIGHT Right     HYSTERECTOMY      CL[place] LOCALIZATION WIRE 1 SITE RIGHT (CPT=19281)      pt states 20 yrs+ she had a benign cyst removed from her rt breast.     OOPHORECTOMY        Family History   Problem Relation Age of Onset    Heart Disorder Mother         congestive heart failure    Diabetes Other         Daughter has DM    Breast Cancer Other         maternal cousin    Macular degeneration Neg     Glaucoma Neg       Social History:   Social History     Socioeconomic History    Marital status: Single   Tobacco Use    Smoking status: Never    Smokeless tobacco: Never   Vaping Use    Vaping Use: Never used   Substance and Sexual Activity    Alcohol use: No    Drug use: No   Other Topics Concern    Caffeine Concern No        Objective:   Physical Exam  Vitals and nursing note reviewed. Constitutional:       Appearance: She is well-developed. HENT:      Head: Normocephalic and atraumatic. Right Ear: External ear normal.      Left Ear: External ear normal.      Nose: Nose normal.   Eyes:      Conjunctiva/sclera: Conjunctivae normal.      Pupils: Pupils are equal, round, and reactive to light. Cardiovascular:      Rate and Rhythm: Normal rate and regular rhythm. Heart sounds: Normal heart sounds. Pulmonary:      Effort: Pulmonary effort is normal.      Breath sounds: Normal breath sounds. Abdominal:      General: Bowel sounds are normal.      Palpations: Abdomen is soft. Genitourinary:     Vagina: Normal.   Musculoskeletal:         General: Normal range of motion. Cervical back: Normal range of motion and neck supple. Skin:     General: Skin is warm and dry. Neurological:      Mental Status: She is alert and oriented to person, place, and time. Motor: Weakness present. Gait: Gait abnormal.      Deep Tendon Reflexes: Reflexes are normal and symmetric. Comments: Chronic rt side weakness due to prior cva   Psychiatric:         Behavior: Behavior normal.         Thought Content:  Thought content normal.         Judgment: Judgment normal.         Assessment & Plan:   Type 2 diabetes mellitus with diabetic neuropathy, without long-term current use of insulin (HCC)  (primary encounter diagnosis) we will retest we will follow results  Hyperlipidemia, unspecified hyperlipidemia type continue current treatment watch diet take medication  Essential hypertension well-controlled continue current treatment  Mild anemia we will check occult blood test we will repeat CBC    Orders Placed This Encounter      Occult Blood, Fecal, Immunoassay (Blue cards) [E]      Hemoglobin A1C      CBC With Differential With Platelet      Meds This Visit:  Requested Prescriptions      No prescriptions requested or ordered in this encounter       Imaging & Referrals:  None

## 2023-08-29 RX ORDER — DIPHENHYDRAMINE HCL 25 MG
TABLET ORAL
Qty: 1 KIT | Refills: 0 | OUTPATIENT
Start: 2023-08-29

## 2023-09-20 ENCOUNTER — OFFICE VISIT (OUTPATIENT)
Dept: PHYSICAL MEDICINE AND REHAB | Facility: CLINIC | Age: 73
End: 2023-09-20
Payer: MEDICARE

## 2023-09-20 DIAGNOSIS — M17.11 PRIMARY OSTEOARTHRITIS OF RIGHT KNEE: Primary | ICD-10-CM

## 2023-09-20 DIAGNOSIS — I69.351 SPASTIC HEMIPLEGIA OF RIGHT DOMINANT SIDE AS LATE EFFECT OF CEREBRAL INFARCTION (HCC): ICD-10-CM

## 2023-09-20 DIAGNOSIS — M25.461 EFFUSION OF RIGHT KNEE: ICD-10-CM

## 2023-09-20 DIAGNOSIS — E11.40 TYPE 2 DIABETES MELLITUS WITH DIABETIC NEUROPATHY, WITHOUT LONG-TERM CURRENT USE OF INSULIN (HCC): ICD-10-CM

## 2023-09-20 PROCEDURE — 1125F AMNT PAIN NOTED PAIN PRSNT: CPT | Performed by: PHYSICAL MEDICINE & REHABILITATION

## 2023-09-20 PROCEDURE — 99213 OFFICE O/P EST LOW 20 MIN: CPT | Performed by: PHYSICAL MEDICINE & REHABILITATION

## 2023-09-20 NOTE — PROGRESS NOTES
[address]  Progress Note    CHIEF COMPLAINT:  Patient presents with:  Knee Pain: LOV 8/23/2023 Patient follows up on R knee pain, received series of 3 orthovisc injections and states she's felt much better since having these. Continues to have an occasional ache in her knee. LO 4/10      History of Present Illness:  Vel Canada is a 68year old female who presents today for follow up for symptoms of right knee pain and effusion. She is status post Synvisc x3 and is about 50% better. The knee feels much better, still some swelling and pain. She is not a candidate for knee replacement per Dr. Caitie Hill. PAST MEDICAL HISTORY:  Past Medical History:   Diagnosis Date    Ametropia     Hyperopia    Diabetes ([facility]     Lipid screening 5/29/2014    Numbness and tingling of leg     Other and unspecified hyperlipidemia     Stroke ([facility] [address] 1/2014    Type II or unspecified type diabetes mellitus without mention of complication, not stated as uncontrolled     Unspecified essential hypertension        SURGICAL HISTORY:  Past Surgical History:   Procedure Laterality Date    COLONOSCOPY      5 years ago date unknown    CYST ASPIRATION RIGHT Right     HYSTERECTOMY      CLIFTON LOCALIZATION WIRE 1 SITE RIGHT (CPT=19281)      pt states 20 yrs+ she had a benign cyst removed from her rt breast.     OOPHORECTOMY         SOCIAL HISTORY:   Social History    Occupational History      Not on file    Tobacco Use      Smoking status: Never      Smokeless tobacco: Never    Vaping Use      Vaping Use: Never used    Substance and Sexual Activity      Alcohol use: No      Drug use: No      Sexual activity: Not on file      CURRENT MEDICATIONS:   Current Outpatient Medications   Medication Sig Dispense Refill    methylPREDNISolone 4 MG Oral Tablet Therapy Pack Take as directed on package.  (Patient not taking: Reported on 7/3/2023) 1 each 0    Blood Glucose Monitoring Suppl (TRUE METRIX AIR GLUCOSE METER) w/Device Does not apply Kit USE AS DIRECTED 1 kit 0    gabapentin 100 MG Oral Cap Take 1 capsule (100 mg total) by mouth nightly. 30 capsule 2    Glucose Blood (TRUE METRIX BLOOD GLUCOSE TEST) In Vitro Strip TEST BLOOD SUGAR EVERY  strip 3    metFORMIN 850 MG Oral Tab Take 1 tablet (850 mg total) by mouth 3 (three) times daily. 270 tablet 3    metoprolol succinate ER 25 MG Oral Tablet 24 Hr Take 1 tablet (25 mg total) by mouth daily. 90 tablet 3    Alcohol Swabs (DROPSAFE ALCOHOL PREP) 70 % Does not apply Pads Apply 1 each topically As Directed. 500 each 0    Acetaminophen-Codeine (TYLENOL WITH CODEINE #3) 300-30 MG Oral Tab Take 1 tablet by mouth every 12 (twelve) hours as needed for Pain. 30 tablet 0    methylPREDNISolone 4 MG Oral Tablet Therapy Pack Take as directed on package. 1 each 0    LIDOCAINE 5 % External Patch APPLY 1 PATCH TOPICALLY TO THE SKIN EVERY 24 HOURS AS NEEDED 90 patch 0    naproxen 500 MG Oral Tab Take 1 tablet (500 mg total) by mouth 2 (two) times daily as needed. 15 tablet 0    atorvastatin 20 MG Oral Tab Take 1 tablet (20 mg total) by mouth daily. 90 tablet 1    LISINOPRIL-HYDROCHLOROTHIAZIDE 20-25 MG Oral Tab TAKE 1 TABLET EVERY DAY 90 tablet 1    CLOPIDOGREL 75 MG Oral Tab TAKE 1 TABLET EVERY DAY 90 tablet 1    amLODIPine 2.5 MG Oral Tab Take 1 tablet (2.5 mg total) by mouth daily. 30 tablet 2    TRUEPLUS LANCETS 33G Does not apply Misc USE TO CHECK GLUCOSE ONCE DAILY 100 each 3    Diclofenac Sodium 1 % External Gel Apply 2 g topically 4 (four) times daily. 1 each 0    SHINGRIX 50 MCG/0.5ML Intramuscular Recon Susp       Pyridoxine HCl (VITAMIN B-6 OR) Take by mouth. CALCIUM OR Take by mouth. Cholecalciferol (VITAMIN D-3 OR) Take by mouth.       ONETOUCH DELICA LANCETS 84N Does not apply Misc use as directed twice daily 100 each 6    aspirin 81 MG Oral Tab EC TAKE 1 TABLET BY MOUTH EVERY DAY 30 tablet 2    melatonin 3 MG Oral Tab Take one tab at night 30 min before going to bed 30 tablet 0       ALLERGIES:   No Known Allergies      PHYSICAL EXAM:   There were no vitals taken for this visit. There is no height or weight on file to calculate BMI. General: No immediate distress  Extremities: No lower extremity edema bilaterally   Spine: full and painfree lumbar ROM in all directions  Knees: Flexion to 100 degrees, extension to -15 degrees, moderate effusion on the right  Neuro:   Cognition: alert & oriented x 3, attentive, able to follow 2 step commands, comprehention intact, spontaneous speech intact  Strength: Hemiparesis      ASSESSMENT AND PLAN:  1. Primary osteoarthritis of right knee  She seems to be better status post Synvisc. She will return as needed. This can be repeated in 6 months    2. Effusion of right knee  Chronic and stable    3. Type 2 diabetes mellitus with diabetic neuropathy, without long-term current use of insulin (HCC)  Avoiding steroids if possible    4. Spastic hemiplegia of right dominant side as late effect of cerebral infarction (HCC)  Causing hyperextension during gait, aggravating the right knee        RTC as needed      The patient was in agreement with the assessment and plan. All questions were answered.         Mago Pruett MD  Physical Medicine and Rehabilitation/Sports Medicine  MEDICAL CENTER [facility]

## 2023-10-16 ENCOUNTER — TELEPHONE (OUTPATIENT)
Dept: INTERNAL MEDICINE CLINIC | Facility: CLINIC | Age: 73
End: 2023-10-16

## 2023-10-16 DIAGNOSIS — Z12.31 SCREENING MAMMOGRAM, ENCOUNTER FOR: Primary | ICD-10-CM

## 2023-11-03 ENCOUNTER — HOSPITAL ENCOUNTER (OUTPATIENT)
Dept: MAMMOGRAPHY | Age: 73
Discharge: HOME | End: 2023-11-03
Attending: INTERNAL MEDICINE
Payer: MEDICARE

## 2023-11-03 DIAGNOSIS — Z12.31 SCREENING MAMMOGRAM, ENCOUNTER FOR: ICD-10-CM

## 2023-11-03 PROCEDURE — 77067 SCR MAMMO BI INCL CAD: CPT | Performed by: INTERNAL MEDICINE

## 2023-11-03 PROCEDURE — 77063 BREAST TOMOSYNTHESIS BI: CPT | Performed by: INTERNAL MEDICINE

## 2023-11-30 ENCOUNTER — OFFICE VISIT (OUTPATIENT)
Dept: PODIATRY CLINIC | Facility: CLINIC | Age: 73
End: 2023-11-30
Payer: MEDICARE

## 2023-11-30 DIAGNOSIS — Z79.4 TYPE 2 DIABETES MELLITUS WITHOUT COMPLICATION, WITH LONG-TERM CURRENT USE OF INSULIN (HCC): Primary | ICD-10-CM

## 2023-11-30 DIAGNOSIS — E11.9 TYPE 2 DIABETES MELLITUS WITHOUT COMPLICATION, WITH LONG-TERM CURRENT USE OF INSULIN (HCC): Primary | ICD-10-CM

## 2023-11-30 PROCEDURE — 99213 OFFICE O/P EST LOW 20 MIN: CPT | Performed by: STUDENT IN AN ORGANIZED HEALTH CARE EDUCATION/TRAINING PROGRAM

## 2023-11-30 PROCEDURE — 1126F AMNT PAIN NOTED NONE PRSNT: CPT | Performed by: STUDENT IN AN ORGANIZED HEALTH CARE EDUCATION/TRAINING PROGRAM

## 2023-11-30 PROCEDURE — 1159F MED LIST DOCD IN RCRD: CPT | Performed by: STUDENT IN AN ORGANIZED HEALTH CARE EDUCATION/TRAINING PROGRAM

## 2023-11-30 RX ORDER — ISOPROPYL ALCOHOL 70 ML/100ML
1 SWAB TOPICAL AS DIRECTED
Qty: 100 EACH | Refills: 3 | Status: SHIPPED | OUTPATIENT
Start: 2023-11-30

## 2023-11-30 RX ORDER — ATORVASTATIN CALCIUM 20 MG/1
20 TABLET, FILM COATED ORAL DAILY
Qty: 90 TABLET | Refills: 3 | Status: SHIPPED | OUTPATIENT
Start: 2023-11-30

## 2023-11-30 RX ORDER — LISINOPRIL AND HYDROCHLOROTHIAZIDE 25; 20 MG/1; MG/1
1 TABLET ORAL DAILY
Qty: 90 TABLET | Refills: 3 | Status: SHIPPED | OUTPATIENT
Start: 2023-11-30

## 2023-11-30 RX ORDER — CLOPIDOGREL BISULFATE 75 MG/1
75 TABLET ORAL DAILY
Qty: 90 TABLET | Refills: 3 | Status: SHIPPED | OUTPATIENT
Start: 2023-11-30

## 2023-11-30 NOTE — TELEPHONE ENCOUNTER
Atorvastatin Refill passed per [place] [employer], [facility] protocol. Please advise on remaining prescriptions which did not past protocol. Requested Prescriptions   Pending Prescriptions Disp Refills    DROPSAFE ALCOHOL PREP 70 % Does not apply Pads [Pharmacy Med Name: DROPSAFE ALCOHOL PREP PADS 70 % Pad]  3     Sig: APPLY TOPICALLY AS DIRECTED       There is no refill protocol information for this order       CLOPIDOGREL 75 MG Oral Tab [Pharmacy Med Name: CLOPIDOGREL 75 MG Tablet] 90 tablet 3     Sig: TAKE 1 TABLET EVERY DAY       There is no refill protocol information for this order       LISINOPRIL-HYDROCHLOROTHIAZIDE 20-25 MG Oral Tab [Pharmacy Med Name: LISINOPRIL/HYDROCHLOROTHIAZIDE 20-25 MG Tablet] 90 tablet 3     Sig: TAKE 1 TABLET EVERY DAY       Hypertensive Medications Protocol Failed - 2023 11:09 AM        Failed - CMP or BMP in past 6 months     No results found for this or any previous visit (from the past 4392 hour(s)).             Passed - In person appointment in the past 12 or next 3 months     Recent Outpatient Visits              2 months ago Primary osteoarthritis of right knee    Reji Zeng Myrick Bayley, MD    Office Visit    3 months ago Type 2 diabetes mellitus with diabetic neuropathy, without long-term current use of insulin ([facility] [address]    Osman Zeng MD    Office Visit    3 months ago Primary osteoarthritis of right knee    Reji Zeng Myrick Bayley, MD    Office Visit    3 months ago Primary osteoarthritis of right knee    Atiya Zeng MD    Office Visit    3 months ago Primary osteoarthritis of right knee    Atiya Zeng MD    Office Visit          Future Appointments         Provider Department Appt Notes    Today Zamzam Diane Hayden [place], [address] [address], [address], [place] DM Yanna Denton PT    In 1 week Trevor Alonso MD [address]     In 1 month Lencho Leal MD [address], [address], [employer] EP/ 1 yr DM EE               Passed - Last BP reading less than 140/90     BP Readings from Last 1 Encounters:   08/26/23 118/68               Passed - In person appointment or virtual visit in the past 6 months     Recent Outpatient Visits              2 months ago Primary osteoarthritis of right knee    Reji Trotter Mayra Morse, MD    Office Visit    3 months ago Type 2 diabetes mellitus with diabetic neuropathy, without long-term current use of insulin ([facility] [address]    Edwardo Trotter MD    Office Visit    3 months ago Primary osteoarthritis of right knee    Reji Trotter Mayra Morse, MD    Office Visit    3 months ago Primary osteoarthritis of right knee    Froylan Trotter MD    Office Visit    3 months ago Primary osteoarthritis of right knee    Froylan Trotter MD    Office Visit          Future Appointments         Provider Department Appt Notes    Today Earnest Rojas DPM [facility], [address], [place] DM MALAIKA Denton PT    In 1 week Trevor Alonso MD [address], [address], [employer]     In 1 month Lencho Leal MD [address], [address], [place] EP/ 1 yr DM EE               Passed - EGFRCR or GFRAA > 50     GFR Evaluation  EGFRCR: 74 , resulted on 5/13/2023            ATORVASTATIN 20 MG Oral Tab [Pharmacy Med Name: ATORVASTATIN CALCIUM 20 MG Tablet] 90 tablet 3     Sig: TAKE 1 TABLET EVERY DAY       Cholesterol Medication Protocol Passed - 11/29/2023 11:09 AM        Passed - ALT in past 12 months        Passed - LDL in past 12 months        Passed - Last ALT < 80     Lab Results   Component Value Date    ALT 32 05/13/2023             Passed - Last LDL < 130     Lab Results   Component Value Date    LDL 41 05/13/2023             Passed - In person appointment or virtual visit in the past 12 mos or appointment in next 3 mos     Recent Outpatient Visits              2 months ago Primary osteoarthritis of right knee    Terrell Marroquin MD    Office Visit    3 months ago Type 2 diabetes mellitus with diabetic neuropathy, without long-term current use of insulin ([facility] [address]    Tracy Leon MD    Office Visit    3 months ago Primary osteoarthritis of right knee    Nia Leon MD    Office Visit    3 months ago Primary osteoarthritis of right knee    Nia Leon MD    Office Visit    3 months ago Primary osteoarthritis of right knee    Nia Leon MD    Office Visit          Future Appointments         Provider Department Appt Notes    Today Primitivo Leonard DPM [facility], [address], [place] MICKEY Ram, MALAIKA PT    In 1 week Jeremy Green MD [address], [address], [place]     In 1 month Kelsy Gomez MD [address], [address], [place] EP/ 1 yr MICKEY CROSS                    [unfilled]      [unfilled]

## 2023-11-30 NOTE — PROGRESS NOTES
[facility], [facility] Podiatry  Progress Note      Kenyetta Rivas is a 68year old female. Chief Complaint   Patient presents with    Diabetic Foot Care     1 year f/u - was seen by [facility] on 11/29/22 - last DfI2L=7.0 from 8/25/23 - has no c/o regarding her feet - this AM her OH=565             HPI:     Patient is a pleasant 63-year-old diabetic female presents to clinic today for bilateral foot evaluation. Allergies: Patient has no known allergies. Current Outpatient Medications   Medication Sig Dispense Refill    atorvastatin 20 MG Oral Tab Take 1 tablet (20 mg total) by mouth daily. 90 tablet 3    methylPREDNISolone 4 MG Oral Tablet Therapy Pack Take as directed on package. (Patient not taking: Reported on 7/3/2023) 1 each 0    Blood Glucose Monitoring Suppl (TRUE METRIX AIR GLUCOSE METER) w/Device Does not apply Kit USE AS DIRECTED 1 kit 0    gabapentin 100 MG Oral Cap Take 1 capsule (100 mg total) by mouth nightly. 30 capsule 2    Glucose Blood (TRUE METRIX BLOOD GLUCOSE TEST) In Vitro Strip TEST BLOOD SUGAR EVERY  strip 3    metFORMIN 850 MG Oral Tab Take 1 tablet (850 mg total) by mouth 3 (three) times daily. 270 tablet 3    metoprolol succinate ER 25 MG Oral Tablet 24 Hr Take 1 tablet (25 mg total) by mouth daily. 90 tablet 3    Alcohol Swabs (DROPSAFE ALCOHOL PREP) 70 % Does not apply Pads Apply 1 each topically As Directed. 500 each 0    Acetaminophen-Codeine (TYLENOL WITH CODEINE #3) 300-30 MG Oral Tab Take 1 tablet by mouth every 12 (twelve) hours as needed for Pain. 30 tablet 0    methylPREDNISolone 4 MG Oral Tablet Therapy Pack Take as directed on package. 1 each 0    LIDOCAINE 5 % External Patch APPLY 1 PATCH TOPICALLY TO THE SKIN EVERY 24 HOURS AS NEEDED 90 patch 0    naproxen 500 MG Oral Tab Take 1 tablet (500 mg total) by mouth 2 (two) times daily as needed.  15 tablet 0    LISINOPRIL-HYDROCHLOROTHIAZIDE 20-25 MG Oral Tab TAKE 1 TABLET EVERY DAY 90 tablet 1    CLOPIDOGREL 75 MG Oral Tab TAKE 1 TABLET EVERY DAY 90 tablet 1    amLODIPine 2.5 MG Oral Tab Take 1 tablet (2.5 mg total) by mouth daily. 30 tablet 2    TRUEPLUS LANCETS 33G Does not apply Misc USE TO CHECK GLUCOSE ONCE DAILY 100 each 3    Diclofenac Sodium 1 % External Gel Apply 2 g topically 4 (four) times daily. 1 each 0    SHINGRIX 50 MCG/0.5ML Intramuscular Recon Susp       Pyridoxine HCl (VITAMIN B-6 OR) Take by mouth. CALCIUM OR Take by mouth. Cholecalciferol (VITAMIN D-3 OR) Take by mouth.       [facility] LANCETS 70O Does not apply Misc use as directed twice daily 100 each 6    aspirin 81 MG Oral Tab EC TAKE 1 TABLET BY MOUTH EVERY DAY 30 tablet 2    melatonin 3 MG Oral Tab Take one tab at night 30 min before going to bed 30 tablet 0      Past Medical History:   Diagnosis Date    Ametropia     Hyperopia    Diabetes ([facility] [address]     Lipid screening 5/29/2014    Numbness and tingling of leg     Other and unspecified hyperlipidemia     Stroke ([facility] [address] 1/2014    Type II or unspecified type diabetes mellitus without mention of complication, not stated as uncontrolled     Unspecified essential hypertension       Past Surgical History:   Procedure Laterality Date    COLONOSCOPY      5 years ago date unknown    CYST ASPIRATION RIGHT Right     HYSTERECTOMY      [place] LOCALIZATION WIRE 1 SITE RIGHT (CPT=19281)      pt states 20 yrs+ she had a benign cyst removed from her rt breast.     OOPHORECTOMY        Family History   Problem Relation Age of Onset    Heart Disorder Mother         congestive heart failure    Diabetes Other         Daughter has DM    Breast Cancer Other         maternal cousin    Macular degeneration Neg     Glaucoma Neg       Social History     Socioeconomic History    Marital status: Single   Tobacco Use    Smoking status: Never    Smokeless tobacco: Never   Vaping Use    Vaping Use: Never used   Substance and Sexual Activity    Alcohol use: No    Drug use: No   Other Topics Concern    Caffeine Concern No REVIEW OF SYSTEMS:     Denies nause, fever, chills  No calf pain  Denies chest pain or SOB      EXAM:   There were no vitals taken for this visit. GENERAL: well developed, well nourished, in no apparent distress  EXTREMITIES:   1. Integument: Normal skin temperature and turgor. Toenails are within normal limits. 2. Vascular: Dorsalis pedis two out of four bilateral and posterior tibial pulses two out of   four bilateral, capillary refill normal.   3. Musculoskeletal: All muscle groups are graded 5 out of 5 in the foot and ankle. 4. Neurological: Normal sharp dull sensation; reflexes normal.             ASSESSMENT AND PLAN:   Diagnoses and all orders for this visit:    Type 2 diabetes mellitus without complication, with long-term current use of insulin ([facility] [address]        Plan:       -Patient examined, chart history reviewed. -Discussed importance of proper pedal hygiene, regular foot checks, and tight glucose control  -Ambulate with supportive shoes and inserts and avoid walking barefoot.  -Educated patient on acute signs of infection advised patient to seek immediate medical attention if symptoms arise. RTC in 1 year       The patient indicates understanding of these issues and agrees to the plan.         Alexei Delgado DPM

## 2023-12-09 ENCOUNTER — OFFICE VISIT (OUTPATIENT)
Dept: INTERNAL MEDICINE CLINIC | Facility: CLINIC | Age: 73
End: 2023-12-09

## 2023-12-09 VITALS
TEMPERATURE: 98 F | SYSTOLIC BLOOD PRESSURE: 122 MMHG | HEIGHT: 63 IN | DIASTOLIC BLOOD PRESSURE: 76 MMHG | RESPIRATION RATE: 17 BRPM | WEIGHT: 149 LBS | OXYGEN SATURATION: 98 % | BODY MASS INDEX: 26.4 KG/M2 | HEART RATE: 76 BPM

## 2023-12-09 DIAGNOSIS — E11.40 TYPE 2 DIABETES MELLITUS WITH DIABETIC NEUROPATHY, WITHOUT LONG-TERM CURRENT USE OF INSULIN (HCC): Primary | ICD-10-CM

## 2023-12-09 DIAGNOSIS — I50.32 CHRONIC DIASTOLIC CONGESTIVE HEART FAILURE, NYHA CLASS 1 (HCC): ICD-10-CM

## 2023-12-09 DIAGNOSIS — D64.9 MILD ANEMIA: ICD-10-CM

## 2023-12-09 DIAGNOSIS — M17.0 PRIMARY OSTEOARTHRITIS OF BOTH KNEES: ICD-10-CM

## 2023-12-09 DIAGNOSIS — I63.50 CEREBRAL ARTERY OCCLUSION WITH CEREBRAL INFARCTION (HCC): ICD-10-CM

## 2023-12-09 DIAGNOSIS — I69.351 SPASTIC HEMIPLEGIA OF RIGHT DOMINANT SIDE AS LATE EFFECT OF CEREBRAL INFARCTION (HCC): ICD-10-CM

## 2023-12-09 LAB
EST. AVERAGE GLUCOSE BLD GHB EST-MCNC: 169 MG/DL (ref 68–126)
HBA1C MFR BLD: 7.5 % (ref ?–5.7)

## 2023-12-09 PROCEDURE — 36415 COLL VENOUS BLD VENIPUNCTURE: CPT | Performed by: INTERNAL MEDICINE

## 2023-12-09 PROCEDURE — 1126F AMNT PAIN NOTED NONE PRSNT: CPT | Performed by: INTERNAL MEDICINE

## 2023-12-09 PROCEDURE — G0008 ADMIN INFLUENZA VIRUS VAC: HCPCS | Performed by: INTERNAL MEDICINE

## 2023-12-09 PROCEDURE — 1159F MED LIST DOCD IN RCRD: CPT | Performed by: INTERNAL MEDICINE

## 2023-12-09 PROCEDURE — 3078F DIAST BP <80 MM HG: CPT | Performed by: INTERNAL MEDICINE

## 2023-12-09 PROCEDURE — 3051F HG A1C>EQUAL 7.0%<8.0%: CPT | Performed by: INTERNAL MEDICINE

## 2023-12-09 PROCEDURE — 90662 IIV NO PRSV INCREASED AG IM: CPT | Performed by: INTERNAL MEDICINE

## 2023-12-09 PROCEDURE — 3074F SYST BP LT 130 MM HG: CPT | Performed by: INTERNAL MEDICINE

## 2023-12-09 PROCEDURE — 3008F BODY MASS INDEX DOCD: CPT | Performed by: INTERNAL MEDICINE

## 2023-12-09 PROCEDURE — 1160F RVW MEDS BY RX/DR IN RCRD: CPT | Performed by: INTERNAL MEDICINE

## 2023-12-09 PROCEDURE — 99214 OFFICE O/P EST MOD 30 MIN: CPT | Performed by: INTERNAL MEDICINE

## 2023-12-11 ENCOUNTER — TELEPHONE (OUTPATIENT)
Dept: INTERNAL MEDICINE CLINIC | Facility: CLINIC | Age: 73
End: 2023-12-11

## 2023-12-11 DIAGNOSIS — E78.5 HYPERLIPIDEMIA, UNSPECIFIED HYPERLIPIDEMIA TYPE: ICD-10-CM

## 2023-12-11 DIAGNOSIS — I10 PRIMARY HYPERTENSION: ICD-10-CM

## 2023-12-11 DIAGNOSIS — I10 ESSENTIAL HYPERTENSION: Primary | ICD-10-CM

## 2023-12-11 DIAGNOSIS — E78.2 MIXED HYPERLIPIDEMIA: Primary | ICD-10-CM

## 2023-12-11 DIAGNOSIS — I10 ESSENTIAL HYPERTENSION: ICD-10-CM

## 2023-12-11 RX ORDER — ATORVASTATIN CALCIUM 20 MG/1
20 TABLET, FILM COATED ORAL NIGHTLY
Qty: 30 TABLET | Refills: 0 | Status: SHIPPED | OUTPATIENT
Start: 2023-12-11

## 2023-12-11 RX ORDER — METOPROLOL SUCCINATE 25 MG/1
25 TABLET, EXTENDED RELEASE ORAL DAILY
Qty: 30 TABLET | Refills: 0 | Status: SHIPPED | OUTPATIENT
Start: 2023-12-11

## 2023-12-11 NOTE — TELEPHONE ENCOUNTER
Crystal Ochoa is a 68 y.o. male    Chief Complaint   Patient presents with    Chemotherapy     Muscle invasive bladder cancer- Mixed histology       1. Have you been to the ER, urgent care clinic since your last visit? Hospitalized since your last visit? Yes, [facility]     2. Have you seen or consulted any other health care providers outside of the [address] [place] [address] since your last visit? Include any pap smears or colon screening.  No Left a detailed message to cell (ok per FANNY) regarding note below. Advised to call back to advise us: does she need a short supply sent to her local pharmacy? I called [facility] and spoke to [employer] (tech) to find out how many pills need to be sent to local pharmacy. Per [employer], no one put in any notes but many other medications were sent out so she will note that we called. I will sent a 30 day supply to local pharmacy. Refill passed per [place] [facility], [facility] protocol.   Hypertensive Medications  Protocol Criteria:  Appointment scheduled in the past 6 months or in the next 3 months  BMP or CMP in the past 12 months  Creatinine result < 2  Recent Outpatient Visits              2 days ago Type 2 diabetes mellitus with diabetic neuropathy, without long-term current use of insulin ([facility])    [address], [address], Colin Posada MD    Office Visit    1 week ago Type 2 diabetes mellitus without complication, with long-term current use of insulin ([facility] [address]    Ana Bauer [place]    Office Visit    2 months ago Primary osteoarthritis of right knee    Reji Bauer Kathyrn Factor, MD    Office Visit    3 months ago Type 2 diabetes mellitus with diabetic neuropathy, without long-term current use of insulin ([facility])    Colin Bauer MD    Office Visit    3 months ago Primary osteoarthritis of right knee    Carlos Bauer MD    Office Visit          Future Appointments         Provider Department Appt Notes    In 1 month Jose E Mark MD [address], [address], [place] EP/ 1 yr DM EE    In 12 months Cheryl Lynn DPM [address], [address], [place] DM [facility] EXAM          Lab Results   Component Value Date     (H) 05/13/2023    BUN 19 (H) 05/13/2023    CREATSERUM 0.84 05/13/2023    BUNCREA 22.6 (H) 05/13/2023    GFRNAA 61 05/21/2022    GFRAA 71 05/21/2022    CA 9.9 05/13/2023    ALKPHOS 54 03/13/2015    AST 20 05/13/2023    ALT 32 05/13/2023    BILT 0.5 05/13/2023    TP 8.1 05/13/2023    ALB 4.1 05/13/2023     05/13/2023    K 3.6 05/13/2023     05/13/2023    CO2 29.0 05/13/2023    GLOBULIN 4.0 05/13/2023    AGRATIO 1.4 03/13/2015    ANIONGAP 4 05/13/2023    OSMOCALC 291 05/13/2023     Refill passed per [address] protocol.   Cholesterol Medications  Protocol Criteria:  Appointment scheduled in the past 12 months or in the next 3 months  ALT & LDL on file in the past 12 months  ALT result < 80  LDL result <130   Recent Outpatient Visits              2 days ago Type 2 diabetes mellitus with diabetic neuropathy, without long-term current use of insulin ([facility])    [address], [address], Edwardo Church MD    Office Visit    1 week ago Type 2 diabetes mellitus without complication, with long-term current use of insulin ([facility] [address]    [address] [facility] Earnest Rojas [place]    Office Visit    2 months ago Primary osteoarthritis of right knee    [address] [facility], Froylan Buchanan MD    Office Visit    3 months ago Type 2 diabetes mellitus with diabetic neuropathy, without long-term current use of insulin (HCC)    [address] [facility], Edwardo Church MD    Office Visit    3 months ago Primary osteoarthritis of right knee    [address] [facility], Froylan Buchanan MD    Office Visit          Future Appointments         Provider Department Appt Notes    In 1 month Lencho Leal MD [address], [address], [place] EP/ 1 yr DM EE    In 12 months Earnest Rojas DPM [facility], [address], [place] MICKEY Bustos Gave          Lab Results   Component Value Date    LDL 41 05/13/2023     Lab Results   Component Value Date    ALT 32 05/13/2023

## 2023-12-11 NOTE — TELEPHONE ENCOUNTER
Spoke to the patient informing her of msg below. Pt states understanding. Will try to get to the lab tomorrow as she has just finished having breakfast.   , please sign off on labs. It was for the CMP and Lipid.

## 2023-12-11 NOTE — TELEPHONE ENCOUNTER
Khoi called requesting refill on medication for patient, mediation below, zackery said medication got lost in the mail and would need it to be sent to [facility] in the meantime.      Medication Quantity Refills Start End   atorvastatin 20 MG Oral Tab         Medication Quantity Refills Start End   metoprolol succinate ER 25 MG Oral Tablet 24 Hr

## 2023-12-12 RX ORDER — METOPROLOL SUCCINATE 25 MG/1
25 TABLET, EXTENDED RELEASE ORAL DAILY
Qty: 90 TABLET | Refills: 0 | OUTPATIENT
Start: 2023-12-12

## 2023-12-12 RX ORDER — ATORVASTATIN CALCIUM 20 MG/1
20 TABLET, FILM COATED ORAL NIGHTLY
Qty: 90 TABLET | Refills: 0 | OUTPATIENT
Start: 2023-12-12

## 2023-12-14 NOTE — PROGRESS NOTES
Subjective:     Patient ID: Payal Zuniga is a 68year old female. HPI  Patient comes in for follow-up overall doing okay she had some change of bowels which has resolved now. No new complaints     History/Other:   Review of Systems   Constitutional: Negative. HENT: Negative. Eyes: Negative. Respiratory: Negative. Cardiovascular: Negative. Gastrointestinal: Negative. Genitourinary: Negative. Musculoskeletal: Negative. Skin: Negative. Neurological: Negative. Psychiatric/Behavioral: Negative. Current Outpatient Medications   Medication Sig Dispense Refill    metFORMIN 1000 MG Oral Tab Take 1 tablet (1,000 mg total) by mouth 2 (two) times daily with meals. 180 tablet 1    Alcohol Swabs (DROPSAFE ALCOHOL PREP) 70 % Does not apply Pads Apply 1 Application topically As Directed. 100 each 3    clopidogrel 75 MG Oral Tab Take 1 tablet (75 mg total) by mouth daily. 90 tablet 3    lisinopril-hydroCHLOROthiazide 20-25 MG Oral Tab Take 1 tablet by mouth daily. 90 tablet 3    gabapentin 100 MG Oral Cap Take 1 capsule (100 mg total) by mouth nightly. 30 capsule 2    Glucose Blood (TRUE METRIX BLOOD GLUCOSE TEST) In Vitro Strip TEST BLOOD SUGAR EVERY  strip 3    Acetaminophen-Codeine (TYLENOL WITH CODEINE #3) 300-30 MG Oral Tab Take 1 tablet by mouth every 12 (twelve) hours as needed for Pain. 30 tablet 0    LIDOCAINE 5 % External Patch APPLY 1 PATCH TOPICALLY TO THE SKIN EVERY 24 HOURS AS NEEDED 90 patch 0    naproxen 500 MG Oral Tab Take 1 tablet (500 mg total) by mouth 2 (two) times daily as needed. 15 tablet 0    amLODIPine 2.5 MG Oral Tab Take 1 tablet (2.5 mg total) by mouth daily. 30 tablet 2    Diclofenac Sodium 1 % External Gel Apply 2 g topically 4 (four) times daily. 1 each 0    SHINGRIX 50 MCG/0.5ML Intramuscular Recon Susp       Pyridoxine HCl (VITAMIN B-6 OR) Take by mouth. CALCIUM OR Take by mouth. Cholecalciferol (VITAMIN D-3 OR) Take by mouth. aspirin 81 MG Oral Tab EC TAKE 1 TABLET BY MOUTH EVERY DAY 30 tablet 2    melatonin 3 MG Oral Tab Take one tab at night 30 min before going to bed 30 tablet 0    atorvastatin 20 MG Oral Tab Take 1 tablet (20 mg total) by mouth nightly. 30 tablet 0    metoprolol succinate ER 25 MG Oral Tablet 24 Hr Take 1 tablet (25 mg total) by mouth daily. 30 tablet 0    methylPREDNISolone 4 MG Oral Tablet Therapy Pack Take as directed on package. (Patient not taking: Reported on 7/3/2023) 1 each 0    Blood Glucose Monitoring Suppl (TRUE METRIX AIR GLUCOSE METER) w/Device Does not apply Kit USE AS DIRECTED 1 kit 0    methylPREDNISolone 4 MG Oral Tablet Therapy Pack Take as directed on package.  1 each 0     Allergies:No Known Allergies    Past Medical History:   Diagnosis Date    Ametropia     Hyperopia    Diabetes ([facility] [address]     Lipid screening 5/29/2014    Numbness and tingling of leg     Other and unspecified hyperlipidemia     Stroke ([facility] [address] 1/2014    Type II or unspecified type diabetes mellitus without mention of complication, not stated as uncontrolled     Unspecified essential hypertension       Past Surgical History:   Procedure Laterality Date    COLONOSCOPY      5 years ago date unknown    CYST ASPIRATION RIGHT Right     HYSTERECTOMY      [place] LOCALIZATION WIRE 1 SITE RIGHT (CPT=19281)      pt states 20 yrs+ she had a benign cyst removed from her rt breast.     OOPHORECTOMY        Family History   Problem Relation Age of Onset    Heart Disorder Mother         congestive heart failure    Diabetes Other         Daughter has DM    Breast Cancer Other         maternal cousin    Macular degeneration Neg     Glaucoma Neg       Social History:   Social History     Socioeconomic History    Marital status: Single   Tobacco Use    Smoking status: Never    Smokeless tobacco: Never   Vaping Use    Vaping Use: Never used   Substance and Sexual Activity    Alcohol use: No    Drug use: No   Other Topics Concern    Caffeine Concern No Objective:   Physical Exam  Vitals and nursing note reviewed. Constitutional:       Appearance: She is well-developed. HENT:      Head: Normocephalic and atraumatic. Right Ear: External ear normal.      Left Ear: External ear normal.      Nose: Nose normal.   Eyes:      Conjunctiva/sclera: Conjunctivae normal.      Pupils: Pupils are equal, round, and reactive to light. Cardiovascular:      Rate and Rhythm: Normal rate and regular rhythm. Heart sounds: Normal heart sounds. Pulmonary:      Effort: Pulmonary effort is normal.      Breath sounds: Normal breath sounds. Abdominal:      General: Bowel sounds are normal.      Palpations: Abdomen is soft. Genitourinary:     Vagina: Normal.   Musculoskeletal:         General: Normal range of motion. Cervical back: Normal range of motion and neck supple. Skin:     General: Skin is warm and dry. Neurological:      Mental Status: She is alert and oriented to person, place, and time. Deep Tendon Reflexes: Reflexes are normal and symmetric. Psychiatric:         Behavior: Behavior normal.         Thought Content: Thought content normal.         Judgment: Judgment normal.         Assessment & Plan:   1. Type 2 diabetes mellitus with  neuropathy, without long-term current use of insulin ([facility] [address] will recheck A1c watch diet take medication   2. Primary osteoarthritis of both knees follows with Ortho   3. Chronic diastolic congestive heart failure, NYHA class 1 (HCC) stable    4. Mild anemia - will follow    5. Cerebral artery occlusion with cerebral infarction ([facility]) - chronic    6.  Spastic hemiplegia of right dominant side as late effect of cerebral infarction ([address] - chronic        Orders Placed This Encounter   Procedures    Hemoglobin A1C    Lipid Panel    Comp Metabolic Panel (14)    Occult Blood, Fecal, Immunoassay (Blue cards) [E]    FLU VACC HIGH DOSE PRSV FREE       Meds This Visit:  Requested Prescriptions      No prescriptions requested or ordered in this encounter       Imaging & Referrals:  FLU VACC HIGH DOSE PRSV FREE

## 2023-12-29 ENCOUNTER — TELEPHONE (OUTPATIENT)
Dept: ORTHOPEDICS CLINIC | Facility: CLINIC | Age: 73
End: 2023-12-29

## 2024-01-03 RX ORDER — LIDOCAINE 50 MG/G
PATCH TOPICAL
Qty: 90 PATCH | Refills: 0 | OUTPATIENT
Start: 2024-01-03

## 2024-01-04 NOTE — TELEPHONE ENCOUNTER
Per pt states she is not due for appt yet, states she needs 30 day supply of patches. Please advise thank you.

## 2024-01-16 ENCOUNTER — OFFICE VISIT (OUTPATIENT)
Dept: OPHTHALMOLOGY | Facility: CLINIC | Age: 74
End: 2024-01-16
Payer: MEDICARE

## 2024-01-16 DIAGNOSIS — H43.393 FLOATER, VITREOUS, BILATERAL: ICD-10-CM

## 2024-01-16 DIAGNOSIS — H25.13 AGE-RELATED NUCLEAR CATARACT OF BOTH EYES: ICD-10-CM

## 2024-01-16 DIAGNOSIS — E11.9 DIABETES MELLITUS TYPE 2 WITHOUT RETINOPATHY (HCC): Primary | ICD-10-CM

## 2024-01-16 PROCEDURE — 1160F RVW MEDS BY RX/DR IN RCRD: CPT | Performed by: OPHTHALMOLOGY

## 2024-01-16 PROCEDURE — 92014 COMPRE OPH EXAM EST PT 1/>: CPT | Performed by: OPHTHALMOLOGY

## 2024-01-16 PROCEDURE — 1159F MED LIST DOCD IN RCRD: CPT | Performed by: OPHTHALMOLOGY

## 2024-01-16 PROCEDURE — 92015 DETERMINE REFRACTIVE STATE: CPT | Performed by: OPHTHALMOLOGY

## 2024-01-16 PROCEDURE — 3072F LOW RISK FOR RETINOPATHY: CPT | Performed by: OPHTHALMOLOGY

## 2024-01-16 PROCEDURE — 1126F AMNT PAIN NOTED NONE PRSNT: CPT | Performed by: OPHTHALMOLOGY

## 2024-01-16 PROCEDURE — 2023F DILAT RTA XM W/O RTNOPTHY: CPT | Performed by: OPHTHALMOLOGY

## 2024-01-16 NOTE — TELEPHONE ENCOUNTER
Pt following up on refill request for below med. She is in pain and not due for a follow up until next month.     LIDOCAINE 5 % External Patch, APPLY 1 PATCH TOPICALLY TO THE SKIN EVERY 24 HOURS AS NEEDED, Disp: 90 patch, Rfl: 0

## 2024-01-16 NOTE — ASSESSMENT & PLAN NOTE
Discussed diagnosis of cataracts in detail with patient.  Cataract surgery could be considered at this time due to decreased vision, but it would be patient's choice.   Patient is not interested in surgery at this time.    Will continue to monitor yearly.  Patient will call sooner than 1 year recall if they notice a change in vision.  New glasses RX written.

## 2024-01-16 NOTE — PROGRESS NOTES
Radha August is a 73 year old female.    HPI:     HPI    Patient is here for a diabetic exam.  She complains of blurry vision at distance and near for a couple months.        Pt has been a diabetic for 11 years       Pt's diabetes is currently controlled by pills  Pt checks BS 2x a day  Pt's last blood sugar was 112 this morning  Last HA1C was 7.5 on 12/09/23  Endocrinologist: none  Last edited by Dora Altman OT on 1/16/2024  4:31 PM.        Patient History:  Past Medical History:   Diagnosis Date    Ametropia     Hyperopia    Diabetes (HCC)     Lipid screening 5/29/2014    Numbness and tingling of leg     Other and unspecified hyperlipidemia     Stroke (HCC) 1/2014    Type II or unspecified type diabetes mellitus without mention of complication, not stated as uncontrolled     Unspecified essential hypertension        Surgical History: Radha August has a past surgical history that includes hysterectomy; kenn localization wire 1 site right (cpt=19281) (pt states 20 yrs+ she had a benign cyst removed from her rt breast. ); cyst aspiration right (Right); colonoscopy (5 years ago date unknown); and oophorectomy.    Family History   Problem Relation Age of Onset    Heart Disorder Mother         congestive heart failure    Diabetes Other         Daughter has DM    Breast Cancer Other         maternal cousin    Macular degeneration Neg     Glaucoma Neg        Social History:   Social History     Socioeconomic History    Marital status: Single   Tobacco Use    Smoking status: Never    Smokeless tobacco: Never   Vaping Use    Vaping Use: Never used   Substance and Sexual Activity    Alcohol use: No    Drug use: No   Other Topics Concern    Caffeine Concern No       Medications:  Current Outpatient Medications   Medication Sig Dispense Refill    atorvastatin 20 MG Oral Tab Take 1 tablet (20 mg total) by mouth nightly. 30 tablet 0    metoprolol succinate ER 25 MG Oral Tablet 24 Hr Take 1 tablet (25 mg total) by  mouth daily. 30 tablet 0    metFORMIN 1000 MG Oral Tab Take 1 tablet (1,000 mg total) by mouth 2 (two) times daily with meals. 180 tablet 1    Alcohol Swabs (DROPSAFE ALCOHOL PREP) 70 % Does not apply Pads Apply 1 Application topically As Directed. 100 each 3    clopidogrel 75 MG Oral Tab Take 1 tablet (75 mg total) by mouth daily. 90 tablet 3    lisinopril-hydroCHLOROthiazide 20-25 MG Oral Tab Take 1 tablet by mouth daily. 90 tablet 3    gabapentin 100 MG Oral Cap Take 1 capsule (100 mg total) by mouth nightly. 30 capsule 2    Glucose Blood (TRUE METRIX BLOOD GLUCOSE TEST) In Vitro Strip TEST BLOOD SUGAR EVERY  strip 3    Acetaminophen-Codeine (TYLENOL WITH CODEINE #3) 300-30 MG Oral Tab Take 1 tablet by mouth every 12 (twelve) hours as needed for Pain. 30 tablet 0    LIDOCAINE 5 % External Patch APPLY 1 PATCH TOPICALLY TO THE SKIN EVERY 24 HOURS AS NEEDED 90 patch 0    naproxen 500 MG Oral Tab Take 1 tablet (500 mg total) by mouth 2 (two) times daily as needed. 15 tablet 0    amLODIPine 2.5 MG Oral Tab Take 1 tablet (2.5 mg total) by mouth daily. 30 tablet 2    Diclofenac Sodium 1 % External Gel Apply 2 g topically 4 (four) times daily. 1 each 0    SHINGRIX 50 MCG/0.5ML Intramuscular Recon Susp       Pyridoxine HCl (VITAMIN B-6 OR) Take by mouth.      CALCIUM OR Take by mouth.      Cholecalciferol (VITAMIN D-3 OR) Take by mouth.      aspirin 81 MG Oral Tab EC TAKE 1 TABLET BY MOUTH EVERY DAY 30 tablet 2    melatonin 3 MG Oral Tab Take one tab at night 30 min before going to bed 30 tablet 0    methylPREDNISolone 4 MG Oral Tablet Therapy Pack Take as directed on package. (Patient not taking: Reported on 7/3/2023) 1 each 0    Blood Glucose Monitoring Suppl (TRUE METRIX AIR GLUCOSE METER) w/Device Does not apply Kit USE AS DIRECTED 1 kit 0    methylPREDNISolone 4 MG Oral Tablet Therapy Pack Take as directed on package. 1 each 0       Allergies:  No Known Allergies    ROS:       PHYSICAL EXAM:     Base Eye Exam        Visual Acuity (Snellen - Linear)         Right Left    Dist cc 20/40 -2 20/50 +1    Dist ph cc  20/40 -1    Near cc 20/70 20/40      Correction: Glasses              Tonometry (Icare, 4:44 PM)         Right Left    Pressure 15 17              Pupils         Pupils    Right PERRL    Left PERRL              Visual Fields         Left Right     Full Full              Extraocular Movement         Right Left     Full, Ortho Full, Ortho              Neuro/Psych       Oriented x3: Yes              Dilation       Both eyes: 1.0% Mydriacyl and 2.5% Prieto Synephrine @ 4:45 PM              Dilation #2       Both eyes: 1.0% Mydriacyl and 2.5% Prieto Synephrine @ 4:45 PM                  Slit Lamp and Fundus Exam       Slit Lamp Exam         Right Left    Lids/Lashes Dermatochalasis, Meibomian gland dysfunction Dermatochalasis, Meibomian gland dysfunction    Conjunctiva/Sclera Ocular Melanosis Ocular Melanosis    Cornea Clear, 1+ arcus Clear, 1+ arcus    Anterior Chamber Deep and quiet Deep and quiet    Iris Normal Normal    Lens 3+ Nuclear sclerosis, 1+ Cortical cataract 3+ Nuclear sclerosis, 1+ Cortical cataract    Vitreous Vitreous floaters Vitreous floaters              Fundus Exam         Right Left    Disc Good rim Good rim, Temporal crescent    C/D Ratio 0.5 0.5    Macula Normal- no BDR Normal- no BDR    Vessels Normal Normal    Periphery Normal Normal                  Refraction       Wearing Rx         Sphere Cylinder Axis Add    Right +2.25 +1.75 180 +2.75    Left +2.75 +1.25 180 +2.75      Type: Progressive bifocal              Manifest Refraction         Sphere Cylinder [employer] Dist VA Add Near VA    Right +1.75 +1.75 180 20/40 +3.00 20/30    Left +2.00 +1.25 180 20/30- +3.00 20/30              Final Rx         Sphere Cylinder [employer] Dist VA Add Near VA    Right +1.75 +1.75 180 20/40 +3.00 20/30    Left +2.00 +1.25 180 20/30- +3.00 20/30      Type: Progressive bifocal                     ASSESSMENT/PLAN:     Diagnoses and  Plan:     Diabetes mellitus type 2 without retinopathy (HCC)  Diabetes type II: no background of retinopathy, no signs of neovascularization noted.  Discussed ocular and systemic benefits of blood sugar control.  Diagnosis and treatment discussed in detail with patient.    Will see patient in 1 year for a diabetic exam    Age-related nuclear cataract of both eyes   Discussed diagnosis of cataracts in detail with patient.  Cataract surgery could be considered at this time due to decreased vision, but it would be patient's choice.   Patient is not interested in surgery at this time.    Will continue to monitor yearly.  Patient will call sooner than 1 year recall if they notice a change in vision.  New glasses RX written.       Floater, vitreous, bilateral   There is no evidence of retinal pathology.  All signs and symptoms of retinal detachment/tears explained in detail.    Patient instructed to call the office if they experience increase in floaters, increase in flashes of light, loss of vision or curtain or veil effect.       No orders of the defined types were placed in this encounter.      Meds This Visit:  Requested Prescriptions      No prescriptions requested or ordered in this encounter        Follow up instructions:  Return in about 1 year (around 1/16/2025) for Diabetic eye exam.    1/16/2024  Scribed by: Bret Solis MD

## 2024-01-16 NOTE — PATIENT INSTRUCTIONS
Diabetes mellitus type 2 without retinopathy (HCC)  Diabetes type II: no background of retinopathy, no signs of neovascularization noted.  Discussed ocular and systemic benefits of blood sugar control.  Diagnosis and treatment discussed in detail with patient.    Will see patient in 1 year for a diabetic exam    Age-related nuclear cataract of both eyes   Discussed diagnosis of cataracts in detail with patient.  Cataract surgery could be considered at this time due to decreased vision, but it would be patient's choice.   Patient is not interested in surgery at this time.    Will continue to monitor yearly.  Patient will call sooner than 1 year recall if they notice a change in vision.  New glasses RX written.       Floater, vitreous, bilateral   There is no evidence of retinal pathology.  All signs and symptoms of retinal detachment/tears explained in detail.    Patient instructed to call the office if they experience increase in floaters, increase in flashes of light, loss of vision or curtain or veil effect.

## 2024-01-16 NOTE — ASSESSMENT & PLAN NOTE
There is no evidence of retinal pathology.  All signs and symptoms of retinal detachment/tears explained in detail.    Patient instructed to call the office if they experience increase in floaters, increase in flashes of light, loss of vision or curtain or veil effect.

## 2024-01-16 NOTE — ASSESSMENT & PLAN NOTE
Diabetes type II: no background of retinopathy, no signs of neovascularization noted.  Discussed ocular and systemic benefits of blood sugar control.  Diagnosis and treatment discussed in detail with patient.    Will see patient in 1 year for a diabetic exam

## 2024-01-17 RX ORDER — LIDOCAINE 50 MG/G
PATCH TOPICAL
Qty: 90 PATCH | Refills: 0 | OUTPATIENT
Start: 2024-01-17

## 2024-01-17 NOTE — TELEPHONE ENCOUNTER
Called pt and informed her of denial and she hasn't been seen since June. Pt states she isn't due to be seen for another gel injection yet. I advised her Dr Lujan has not been giving her gel injections, she has been getting them from Dr Ortez in physiatry. She asked that I transfer her to that dept. She had no further concerns.

## 2024-01-19 RX ORDER — LIDOCAINE 50 MG/G
PATCH TOPICAL
Qty: 90 PATCH | Refills: 0 | OUTPATIENT
Start: 2024-01-19

## 2024-01-19 NOTE — TELEPHONE ENCOUNTER
[facility]  Hospitalist Admission Note  Name: Daya Ignacio    MRN: [id number]  YOB: 1930    Age: 88 year old  Date of admission: 8/27/2019  Primary care provider: Marcy Soares    Chief Complaint:  Fall, ankle pain    Assessment and Plan:   Fall, acute traumatic right proximal tibia, fibula fracture: Patient fell at home in the kitchen.  Describes just feeling unwell, but cannot describe any further with this means.  I suspect she may have had an orthostatic episode as her blood pressure was in the low 100 systolic on arrival here.  Unclear if he took all of her home blood pressure medications or not, she was not receiving her isosorbide while here the past 6 days and discharged 12 hours ago.  EKG shows NSR without any ischemic change in similar to previous EKGs.  Appears to have isolated injury of a proximal mildly displaced right tibia and fibula fracture.  Splint placed in ED.  Pain improved on IV morphine.  -Orthopedics consult, n.p.o. as anticipate operative repair needed  -Scheduled acetaminophen 1 g 3 times daily, oxycodone and IV Dilaudid as needed for pain  -Zofran for nausea  -RCRI of 0 indicating low preoperative risk, although inherently to her she has frequent ESBL UTIs and C. difficile trace, so although not a cardiac risk elevation she does have a potential postoperative issues that may arise if she required Almanza catheter or is receiving perioperative antibiotics.  At this time does not appear to have acute infection    History of recurrent falls and deconditioning: Multiple hospitalizations this past year for ESBL UTIs and C. difficile.  Each time comes and deconditioned.  Sent home 12 hours ago with home physical therapy recommended.  Certainly going to need ongoing therapy with a new tib-fib fracture.  May need more permanent placement following TCU.    Recent UTI, history ESBL UTIs: Was here for 6 days for presumed UTI initially with hypotension and sepsis.   Please refer to 12/30 TE , pt no currently under ortho care and being tx by Dr Ortez.    Received 3 days of meropenem and everything seemed to improve.  ID was consulted and recommended no further antibiotics.  Did receive prophylactic oral vancomycin as below.    History C. difficile: Recently received meropenem and is currently on oral vancomycin 125 mg twice daily for prophylaxis through 9/3/2019 which I will continue.    HTN: PTA on amlodipine 10 mg daily, clonidine 0.1 mg twice daily, Isordil 10 mg 3 times daily, and metoprolol succinate 100 mg daily.  During her last hospital stay she received all these medications except for Isordil and left with blood pressure systolic in the 130 range.  Her Isordil was continued at home, question if she took this or not but her blood pressure is low 100 systolic so possibility of orthostatic event.  -For now resume metoprolol succinate in the perioperative period with dose tomorrow and continue Isordil.  Place hold parameters on amlodipine and clonidine for low blood pressure    Anxiety: Resume sertraline 100 mg daily.    History of lumbar compression fractures: Back pain secondary to this.  Lidocaine patch as needed.  Acetaminophen as above.  Continue her gabapentin 200 mg twice daily.    Possibility of a GI bleed within the past 1 year: Continue omeprazole 20 mg daily.    Hypokalemia:  potassium is mildly low at 3.2.  Potassium replacement protocol.    Chronic anemia and thrombocytopenia: Hemoglobin at baseline 9-10.  Platelets also baseline 100-150k.      DVT Prophylaxis: Pneumatic Compression Devices  Code Status: DNR / DNI, discussed with patient and also DNR/DNI last 2 admissions  FEN: N.p.o. for potential operative repair, normal saline at 100 ml/hr  Discharge Dispo: PT, OT, social work consult.  May need more permanent placement following a TCU stay.  Estimated Disch Date / # of Days until Disch: Admit inpatient for traumatic right tib-fib fracture, anticipate operative repair, needing ongoing IV opiates for pain control.  Anticipate at least 2 night  hospital stay      History of Present Illness:  Daya Ignacio is a 88 year old female with PMH including HTN, lumbar compression fractures, upper GI bleed, recurrent ESBL UTIs and C. difficile infection with recent admission here for 6 days for UTI and was just discharged 12 hours ago to home with home care who presents following a fall at her senior living facility.  The patient was reportedly doing better prior to discharge and all vital signs were stable.  She discharged on her home medications and oral vancomycin for C. difficile prophylaxis.  Home care was ordered.  The patient recalls walking in her kitchen and then suddenly feeling unwell.  I asked her to clarify this further and she says she is unable to.  She is not sure if this means lightheadedness or dizziness.  She does not recall any chest pain.  She is not sure about any palpitations.  She did not lose consciousness or hit her head.  She instantly had severe pain in her right leg.  Also has some nausea, no emesis.  Denies any fevers or chills.  Denies any shortness of breath currently.  Family here earlier, but if since left and got home prior to my arrival.    History obtained from patient, medical record, and from Dr. Gold in the emergency department.  Blood pressure on the softer side at 102/51, pulse 70, temperature 98.2, oxygen 97% on room air initially.  Obvious right lower leg deformity.  Received morphine 4 mg x 2 with improvement in pain.  BMP obtained that shows slightly low potassium at 3.2.  CBC shows a white blood cell count 11.0, hemoglobin 10.0, platelet count 139 similar to previous.  X-ray of the right lower leg shows a proximal mildly displaced tib-fib fracture.  She was placed in a splint.  Anticipate operative repair will be needed.  EKG similar to previous showing NSR without any ST elevation or depression or block.  Admit inpatient for ongoing pain control requiring IV opiates and consult orthopedics.  Keep n.p.o.     Past  Medical History reviewed:  Past Medical History:   Diagnosis Date     Branch retinal vein occlusion of right eye 4/16/2014     h/o Clostridium difficile colitis 06/2019     Hypertension      Lumbar compression fracture (H)      Macular degeneration (senile) of retina, unspecified      Upper GI bleed 06/2019     Urinary tract infection due to extended-spectrum beta lactamase (ESBL)-producing Klebsiella 05/2019    resistant to Bactrim     Past Surgical History reviewed:  Past Surgical History:   Procedure Laterality Date     Cataract removal NOS Bilateral      CYSTOSCOPY N/A 8/1/2019    Procedure: Exam under anesthesia, video cystopanendoscopy;  Surgeon: Dennis Carlson MD;  Location:  OR     ESOPHAGOSCOPY, GASTROSCOPY, DUODENOSCOPY (EGD), COMBINED N/A 6/11/2019    Procedure: ESOPHAGOGASTRODUODENOSCOPY (EGD);  Surgeon: Gaurav Degroot MD;  Location:  GI     Hip Pinning procedure Left      Hip replacement NOS Right      TONSILLECTOMY, ADENOIDECTOMY, COMBINED       Social History reviewed:  Social History     Tobacco Use     Smoking status: Never Smoker     Smokeless tobacco: Never Used   Substance Use Topics     Alcohol use: No     Social History     Social History Narrative     Not on file     Family History reviewed:  Family History   Problem Relation Age of Onset     Alzheimer Disease Mother         mild     Cardiovascular Father         heart attack     Neurologic Disorder Brother 83        Parkinson's     Allergies:  Allergies   Allergen Reactions     Atorvastatin Muscle Pain (Myalgia)     Macrobid [Nitrofurantoin Anhydrous] Other (See Comments)     Body aches     Sulfa Drugs      Tolerated Bactrim May 2019     Medications:  Prior to Admission medications    Medication Sig Last Dose Taking? Auth Provider   amLODIPine (NORVASC) 10 MG tablet Take 1 tablet (10 mg) by mouth daily 8/27/2019 at Unknown time Yes Marcy Soares PA-C   cloNIDine (CATAPRES) 0.1 MG tablet TAKE ONE TABLET BY MOUTH TWO TIMES A  DAY 8/27/2019 at Unknown time Yes Marcy Soares PA-C   estradiol (ESTRACE) 0.1 MG/GM vaginal cream Place 2 g vaginally three times a week paraben-free 8/27/2019 at Unknown time Yes Marcy Soares PA-C   gabapentin (NEURONTIN) 100 MG capsule Take 2 capsules (200 mg) by mouth 2 times daily 8/27/2019 at Unknown time Yes Amanda Kenyon MD   isosorbide dinitrate (ISORDIL) 10 MG tablet Take 1 tablet (10 mg) by mouth 3 times daily 8/27/2019 at Unknown time Yes Marcy Soares PA-C   loperamide (IMODIUM) 2 MG capsule Take 1 capsule (2 mg) by mouth 4 times daily as needed for diarrhea 8/27/2019 at Unknown time Yes Amanda Kenyon MD   metoprolol succinate ER (TOPROL-XL) 100 MG 24 hr tablet Take 1 tablet (100 mg) by mouth daily 8/27/2019 at Unknown time Yes Marcy Soares PA-C   omeprazole (PRILOSEC) 20 MG DR capsule Take 1 capsule (20 mg) by mouth every morning (before breakfast) 8/27/2019 at Unknown time Yes Fany Dos Santos MD   sertraline (ZOLOFT) 100 MG tablet Take 1 tablet (100 mg) by mouth daily 8/27/2019 at Unknown time Yes Marcy Soares PA-C   vancomycin (FIRVANQ) 50 MG/ML oral solution Take 2.5 mLs (125 mg) by mouth 2 times daily for 7 days 8/27/2019 at Unknown time Yes Fany Dos Santos MD   Calcium Carbonate (CALCIUM 600 PO) Take 1 tablet by mouth 2 times daily    Reported, Patient   Cholecalciferol (VITAMIN D3) 2000 UNITS CAPS Take 2,000 Units by mouth daily (with dinner)   Unknown, Entered By History   fish oil-omega-3 fatty acids 1000 MG capsule Take 1 g by mouth daily   Unknown, Entered By History   Lidocaine (LIDOCARE) 4 % Patch Apply to low back topically one time a day for pain and remove per schedule as needed.   Reported, Patient   Multiple Vitamins-Minerals (PRESERVISION AREDS) CAPS Take 1 capsule by mouth 2 times daily    Unknown, Entered By History   vitamin C (ASCORBIC ACID) 500 MG tablet Take 500 mg by mouth daily   Reported, Patient     Review of Systems:  A  Comprehensive greater than 10 system review of systems was carried out.  Pertinent positives and negatives are noted above.  Otherwise negative.     Physical Exam:  Blood pressure 102/51, pulse 70, temperature 98.2  F (36.8  C), temperature source Oral, resp. rate 16, weight 56.2 kg (124 lb), SpO2 97 %, not currently breastfeeding.  Wt Readings from Last 1 Encounters:   08/27/19 56.2 kg (124 lb)     Exam:  Constitutional: Awake, NAD  Eyes: sclera white   HEENT: atraumatic, MMM  Respiratory: no respiratory distress, lungs cta bilaterally, no crackles or wheeze  Cardiovascular: RRR.  No murmur   GI: non-tender, not distended, bowel sounds present  Skin: no rash or lesions, acyanotic  Musculoskeletal/extremities: Obvious deformity to right lower extremity, trace bilateral lower extremity edema  Neurologic: Alert, no she is in the ED, speech clear, can wiggle toes, light touch sensation intact in lower extremities  Psychiatric: calm, cooperative, normal affect    Lab and imaging data personally reviewed:  Labs:  Recent Labs   Lab 08/28/19  0004 08/27/19 0715 08/26/19 0712   WBC 11.0 6.2 4.8   HGB 10.0* 9.0* 8.8*   HCT 31.6* 28.8* 28.4*   MCV 99 98 98   * 143* 119*     Recent Labs   Lab 08/28/19  0004 08/27/19 0715 08/26/19 0712    136 139   POTASSIUM 3.2* 3.7 4.0   CHLORIDE 105 105 108   CO2 26 28 28   ANIONGAP 7 3 3   GLC 80 80 89   BUN 23 13 16   CR 0.78 0.47* 0.54   GFRESTIMATED 68 88 83   GFRESTBLACK 78 >90 >90   DAVE 9.0 8.8 8.7       EKG: NSR with no ST elevation or depression    Imaging:  Recent Results (from the past 24 hour(s))   XR Tibia & Fibula Right 2 Views    Narrative    EXAM: XR TIBIA and FIBULA RT 2 VW  LOCATION: [facility]  DATE/TIME: 8/28/2019 12:19 AM    INDICATION: Fall, proximal tibia.  COMPARISON: None.    FINDINGS: Mildly impacted, comminuted, and posteriorly angulated fracture of the proximal tibia. There is a mildly comminuted impacted fracture of the proximal  right fibula.       Jermaine Pereira MD  Hospitalist  [facility]

## 2024-02-10 RX ORDER — ISOPROPYL ALCOHOL 70 ML/100ML
1 SWAB TOPICAL AS DIRECTED
Qty: 400 EACH | Refills: 3 | Status: SHIPPED | OUTPATIENT
Start: 2024-02-10

## 2024-02-10 NOTE — TELEPHONE ENCOUNTER
Refill passed per [facility] protocol.   Refill encounter came in from [facility] asking for quantity of 400  Requested Prescriptions   Pending Prescriptions Disp Refills    DROPSAFE ALCOHOL PREP 70 % Does not apply Pads [Pharmacy Med Name: DROPSAFE ALCOHOL PREP PADS 70 % Pad]  3     Sig: APPLY TOPICALLY AS DIRECTED       There is no refill protocol information for this order         Recent Outpatient Visits              3 weeks ago Diabetes mellitus type 2 without retinopathy ([facility])    [facility] Bret Solis MD    Office Visit    2 months ago Type 2 diabetes mellitus with diabetic neuropathy, without long-term current use of insulin [facility]    [facility] Visit    2 months ago Type 2 diabetes mellitus without complication, with long-term current use of insulin ([facility])    [facility] Sherry James DPM    Office Visit    4 months ago Primary osteoarthritis of right knee    [facility] Kevin Ortez MD    Office Visit    5 months ago Type 2 diabetes mellitus with diabetic neuropathy, without long-term current use of insulin ([facility]    Office Visit           Future Appointments         Provider Department Appt Notes    In 9 months Sherry James DPM [facility] DM YRLY EXAM    In 11 months Bret Solis MD [facility] CONF, EP/ 1 yr DM EE

## 2024-03-16 ENCOUNTER — OFFICE VISIT (OUTPATIENT)
Dept: INTERNAL MEDICINE CLINIC | Facility: CLINIC | Age: 74
End: 2024-03-16
Payer: MEDICARE

## 2024-03-16 VITALS
TEMPERATURE: 98 F | OXYGEN SATURATION: 98 % | WEIGHT: 153 LBS | HEART RATE: 59 BPM | SYSTOLIC BLOOD PRESSURE: 130 MMHG | DIASTOLIC BLOOD PRESSURE: 74 MMHG | RESPIRATION RATE: 16 BRPM | BODY MASS INDEX: 27.11 KG/M2 | HEIGHT: 63 IN

## 2024-03-16 DIAGNOSIS — E78.5 HYPERLIPIDEMIA, UNSPECIFIED HYPERLIPIDEMIA TYPE: ICD-10-CM

## 2024-03-16 DIAGNOSIS — I10 ESSENTIAL HYPERTENSION: ICD-10-CM

## 2024-03-16 DIAGNOSIS — Z00.00 MEDICARE ANNUAL WELLNESS VISIT, SUBSEQUENT: Primary | ICD-10-CM

## 2024-03-16 DIAGNOSIS — G89.29 CHRONIC PAIN OF RIGHT KNEE: ICD-10-CM

## 2024-03-16 DIAGNOSIS — H25.13 AGE-RELATED NUCLEAR CATARACT OF BOTH EYES: ICD-10-CM

## 2024-03-16 DIAGNOSIS — M25.561 CHRONIC PAIN OF BOTH KNEES: ICD-10-CM

## 2024-03-16 DIAGNOSIS — I63.50 CEREBRAL ARTERY OCCLUSION WITH CEREBRAL INFARCTION (HCC): ICD-10-CM

## 2024-03-16 DIAGNOSIS — E11.9 DIABETES MELLITUS TYPE 2 WITHOUT RETINOPATHY (HCC): ICD-10-CM

## 2024-03-16 DIAGNOSIS — M25.561 CHRONIC PAIN OF RIGHT KNEE: ICD-10-CM

## 2024-03-16 DIAGNOSIS — M17.11 PRIMARY OSTEOARTHRITIS OF RIGHT KNEE: ICD-10-CM

## 2024-03-16 DIAGNOSIS — I65.23 CAROTID ARTERY PLAQUE, BILATERAL: ICD-10-CM

## 2024-03-16 DIAGNOSIS — R53.1 RIGHT SIDED WEAKNESS: ICD-10-CM

## 2024-03-16 DIAGNOSIS — I50.32 CHRONIC DIASTOLIC CONGESTIVE HEART FAILURE, NYHA CLASS 1 (HCC): ICD-10-CM

## 2024-03-16 DIAGNOSIS — E11.40 TYPE 2 DIABETES MELLITUS WITH DIABETIC NEUROPATHY, WITHOUT LONG-TERM CURRENT USE OF INSULIN (HCC): ICD-10-CM

## 2024-03-16 DIAGNOSIS — I69.351 SPASTIC HEMIPLEGIA OF RIGHT DOMINANT SIDE AS LATE EFFECT OF CEREBRAL INFARCTION (HCC): ICD-10-CM

## 2024-03-16 DIAGNOSIS — G89.29 CHRONIC PAIN OF BOTH KNEES: ICD-10-CM

## 2024-03-16 DIAGNOSIS — I69.398 ABNORMALITY OF GAIT AS LATE EFFECT OF CEREBROVASCULAR ACCIDENT (CVA): ICD-10-CM

## 2024-03-16 DIAGNOSIS — Z00.00 ENCOUNTER FOR ANNUAL HEALTH EXAMINATION: ICD-10-CM

## 2024-03-16 DIAGNOSIS — M25.562 CHRONIC PAIN OF BOTH KNEES: ICD-10-CM

## 2024-03-16 DIAGNOSIS — R26.9 ABNORMALITY OF GAIT AS LATE EFFECT OF CEREBROVASCULAR ACCIDENT (CVA): ICD-10-CM

## 2024-03-16 LAB
ALBUMIN SERPL-MCNC: 4.7 G/DL (ref 3.2–4.8)
ALBUMIN/GLOB SERPL: 1.5 {RATIO} (ref 1–2)
ALP LIVER SERPL-CCNC: 56 U/L
ALT SERPL-CCNC: 25 U/L
ANION GAP SERPL CALC-SCNC: 6 MMOL/L (ref 0–18)
AST SERPL-CCNC: 28 U/L (ref ?–34)
BASOPHILS # BLD AUTO: 0.01 X10(3) UL (ref 0–0.2)
BASOPHILS NFR BLD AUTO: 0.2 %
BILIRUB SERPL-MCNC: 0.8 MG/DL (ref 0.2–1.1)
BILIRUB UR QL: NEGATIVE
BUN BLD-MCNC: 15 MG/DL (ref 9–23)
BUN/CREAT SERPL: 15.5 (ref 10–20)
CALCIUM BLD-MCNC: 10.3 MG/DL (ref 8.7–10.4)
CHLORIDE SERPL-SCNC: 108 MMOL/L (ref 98–112)
CHOLEST SERPL-MCNC: 112 MG/DL (ref ?–200)
CLARITY UR: CLEAR
CO2 SERPL-SCNC: 28 MMOL/L (ref 21–32)
CREAT BLD-MCNC: 0.97 MG/DL
CREAT UR-SCNC: 149.2 MG/DL
DEPRECATED RDW RBC AUTO: 43.8 FL (ref 35.1–46.3)
EGFRCR SERPLBLD CKD-EPI 2021: 62 ML/MIN/1.73M2 (ref 60–?)
EOSINOPHIL # BLD AUTO: 0.03 X10(3) UL (ref 0–0.7)
EOSINOPHIL NFR BLD AUTO: 0.7 %
ERYTHROCYTE [DISTWIDTH] IN BLOOD BY AUTOMATED COUNT: 15.1 % (ref 11–15)
EST. AVERAGE GLUCOSE BLD GHB EST-MCNC: 160 MG/DL (ref 68–126)
FASTING PATIENT LIPID ANSWER: YES
FASTING STATUS PATIENT QL REPORTED: YES
GLOBULIN PLAS-MCNC: 3.1 G/DL (ref 2.8–4.4)
GLUCOSE BLD-MCNC: 137 MG/DL (ref 70–99)
GLUCOSE UR-MCNC: NORMAL MG/DL
HBA1C MFR BLD: 7.2 % (ref ?–5.7)
HCT VFR BLD AUTO: 36.9 %
HDLC SERPL-MCNC: 58 MG/DL (ref 40–59)
HGB BLD-MCNC: 11.6 G/DL
HGB UR QL STRIP.AUTO: NEGATIVE
IMM GRANULOCYTES # BLD AUTO: 0.01 X10(3) UL (ref 0–1)
IMM GRANULOCYTES NFR BLD: 0.2 %
LDLC SERPL CALC-MCNC: 43 MG/DL (ref ?–100)
LEUKOCYTE ESTERASE UR QL STRIP.AUTO: 75
LYMPHOCYTES # BLD AUTO: 1.49 X10(3) UL (ref 1–4)
LYMPHOCYTES NFR BLD AUTO: 35.1 %
MCH RBC QN AUTO: 25 PG (ref 26–34)
MCHC RBC AUTO-ENTMCNC: 31.4 G/DL (ref 31–37)
MCV RBC AUTO: 79.5 FL
MICROALBUMIN UR-MCNC: 0.4 MG/DL
MICROALBUMIN/CREAT 24H UR-RTO: 2.7 UG/MG (ref ?–30)
MONOCYTES # BLD AUTO: 0.28 X10(3) UL (ref 0.1–1)
MONOCYTES NFR BLD AUTO: 6.6 %
NEUTROPHILS # BLD AUTO: 2.43 X10 (3) UL (ref 1.5–7.7)
NEUTROPHILS # BLD AUTO: 2.43 X10(3) UL (ref 1.5–7.7)
NEUTROPHILS NFR BLD AUTO: 57.2 %
NITRITE UR QL STRIP.AUTO: NEGATIVE
NONHDLC SERPL-MCNC: 54 MG/DL (ref ?–130)
OSMOLALITY SERPL CALC.SUM OF ELEC: 297 MOSM/KG (ref 275–295)
PH UR: 5 [PH] (ref 5–8)
PLATELET # BLD AUTO: 244 10(3)UL (ref 150–450)
POTASSIUM SERPL-SCNC: 4 MMOL/L (ref 3.5–5.1)
PROT SERPL-MCNC: 7.8 G/DL (ref 5.7–8.2)
PROT UR-MCNC: NEGATIVE MG/DL
RBC # BLD AUTO: 4.64 X10(6)UL
SODIUM SERPL-SCNC: 142 MMOL/L (ref 136–145)
SP GR UR STRIP: 1.02 (ref 1–1.03)
TRIGL SERPL-MCNC: 41 MG/DL (ref 30–149)
TSI SER-ACNC: 0.65 MIU/ML (ref 0.55–4.78)
UROBILINOGEN UR STRIP-ACNC: NORMAL
VLDLC SERPL CALC-MCNC: 6 MG/DL (ref 0–30)
WBC # BLD AUTO: 4.3 X10(3) UL (ref 4–11)

## 2024-03-16 PROCEDURE — 1126F AMNT PAIN NOTED NONE PRSNT: CPT | Performed by: INTERNAL MEDICINE

## 2024-03-16 PROCEDURE — 1159F MED LIST DOCD IN RCRD: CPT | Performed by: INTERNAL MEDICINE

## 2024-03-16 PROCEDURE — 1170F FXNL STATUS ASSESSED: CPT | Performed by: INTERNAL MEDICINE

## 2024-03-16 PROCEDURE — 3075F SYST BP GE 130 - 139MM HG: CPT | Performed by: INTERNAL MEDICINE

## 2024-03-16 PROCEDURE — 36415 COLL VENOUS BLD VENIPUNCTURE: CPT | Performed by: INTERNAL MEDICINE

## 2024-03-16 PROCEDURE — 1160F RVW MEDS BY RX/DR IN RCRD: CPT | Performed by: INTERNAL MEDICINE

## 2024-03-16 PROCEDURE — 3008F BODY MASS INDEX DOCD: CPT | Performed by: INTERNAL MEDICINE

## 2024-03-16 PROCEDURE — 3051F HG A1C>EQUAL 7.0%<8.0%: CPT | Performed by: INTERNAL MEDICINE

## 2024-03-16 PROCEDURE — 3078F DIAST BP <80 MM HG: CPT | Performed by: INTERNAL MEDICINE

## 2024-03-16 PROCEDURE — 3061F NEG MICROALBUMINURIA REV: CPT | Performed by: INTERNAL MEDICINE

## 2024-03-16 NOTE — PATIENT INSTRUCTIONS
Radha August's SCREENING SCHEDULE   Tests on this list are recommended by your physician but may not be covered, or covered at this frequency, by your insurer.   Please check with your insurance carrier before scheduling to verify coverage.   PREVENTATIVE SERVICES FREQUENCY &  COVERAGE DETAILS LAST COMPLETION DATE   Diabetes Screening    Fasting Blood Sugar /  Glucose    One screening every 12 months if never tested or if previously tested but not diagnosed with pre-diabetes   One screening every 6 months if diagnosed with pre-diabetes Lab Results   Component Value Date     (H) 05/13/2023        Cardiovascular Disease Screening    Lipid Panel  Cholesterol  Lipoprotein (HDL)  Triglycerides Covered every 5 years for all Medicare beneficiaries without apparent signs or symptoms of cardiovascular disease Lab Results   Component Value Date    CHOLEST 129 05/13/2023    HDL 79 (H) 05/13/2023    LDL 41 05/13/2023    TRIG 34 05/13/2023         Electrocardiogram (EKG)   Covered if needed at Welcome to Medicare, and non-screening if indicated for medical reasons 04/19/2019      Ultrasound Screening for Abdominal Aortic Aneurysm (AAA) Covered once in a lifetime for one of the following risk factors   • Men who are 65-75 years old and have ever smoked   • Anyone with a family history -     Colorectal Cancer Screening  Covered for ages 50-85; only need ONE of the following:    Colonoscopy   Covered every 10 years    Covered every 2 years if patient is at high risk or previous colonoscopy was abnormal 03/09/2021    Health Maintenance   Topic Date Due   • Colorectal Cancer Screening  03/09/2026       Flexible Sigmoidoscopy   Covered every 4 years -    Fecal Occult Blood Test Covered annually -   Bone Density Screening    Bone density screening    Covered every 2 years after age 65 if diagnosed with risk of osteoporosis or estrogen deficiency.    Covered yearly for long-term glucocorticoid medication use (Steroids) Last  Dexa Scan:    XR DEXA BONE DENSITOMETRY (CPT=77080) 05/22/2021      No recommendations at this time   Pap and Pelvic    Pap   Covered every 2 years for women at normal risk; Annually if at high risk -  No recommendations at this time    Chlamydia Annually if high risk -  No recommendations at this time   Screening Mammogram    Mammogram     Recommend annually for all female patients aged 40 and older    One baseline mammogram covered for patients aged 35-39 11/03/2023    Health Maintenance   Topic Date Due   • Mammogram  11/03/2024       Immunizations    Influenza Covered once per flu season  Please get every year 12/09/2023  No recommendations at this time    Pneumococcal Each vaccine ([id number] & [id number]) covered once after 65 Prevnar 13: 05/13/2016    [id number]: 11/17/2017     No recommendations at this time    Hepatitis B One screening covered for patients with certain risk factors   -  No recommendations at this time    Tetanus Toxoid Not covered by Medicare Part B unless medically necessary (cut with metal); may be covered with your pharmacy prescription benefits -    Tetanus, Diptheria and Pertusis TD and TDaP Not covered by Medicare Part B -  No recommendations at this time    Zoster Not covered by Medicare Part B; may be covered with your pharmacy  prescription benefits -  Zoster Vaccines(2 of 2) due on 12/07/2018     Diabetes      Hemoglobin A1C Annually; if last result is elevated, may be asked to retest more frequently.    Medicare covers every 3 months Lab Results   Component Value Date     (H) 12/09/2023    A1C 7.5 (H) 12/09/2023       No recommendations at this time    Creat/alb ratio Annually Lab Results   Component Value Date    MICROALBCREA 9.4 05/13/2023       LDL Annually Lab Results   Component Value Date    LDL 41 05/13/2023       Dilated Eye Exam Annually Last Diabetic Eye Exam:  Last Dilated Eye Exam: 01/16/24  Eye Exam shows Diabetic Retinopathy?: No       Annual Monitoring of  Persistent Medications (ACE/ARB, digoxin diuretics, anticonvulsants)    Potassium Annually Lab Results   Component Value Date    K 3.6 05/13/2023         Creatinine   Annually Lab Results   Component Value Date    CREATSERUM 0.84 05/13/2023         BUN Annually Lab Results   Component Value Date    BUN 19 (H) 05/13/2023       Drug Serum Conc Annually No results found for: \"DIGOXIN\", \"DIG\", \"VALP\"

## 2024-03-16 NOTE — PROGRESS NOTES
Subjective:   Radha August is a 73 year old female who presents for a Medicare Subsequent Annual Wellness visit (Pt already had Initial Annual Wellness) and .     Patient comes in for Medicare annual overall doing okay no new complaints continues to have pain in her knees right 1 more follows with physiatry has been getting gel shots which helps denies any chest pain or shortness of breath      History/Other:   Fall Risk Assessment:   She has been screened for Falls and is low risk.      Cognitive Assessment:   She had a completely normal cognitive assessment - see flowsheet entries     Functional Ability/Status:   Radha August has a completely normal functional assessment. See flowsheet for details.      Depression Screening (PHQ-2/PHQ-9): PHQ-2 SCORE: 0  , done 3/16/2024   If you checked off any problems, how difficult have these problems made it for you to do your work, take care of things at home, or get along with other people?: Not difficult at all    Last [place] Suicide Screening on 3/16/2024 was No Risk.          Advanced Directives:   She does NOT have a Living Will. [Do you have a living will?: No]  She does NOT have a Power of  for Health Care. [Do you have a healthcare power of ?: No]  Discussed Advance Care Planning with patient (and family/surrogate if present). Standard forms made available to patient in After Visit Summary.      Patient Active Problem List   Diagnosis    Cerebral artery occlusion with cerebral infarction (HCC)    Essential hypertension    Hyperlipidemia    Speech disturbance    Abnormality of gait as late effect of cerebrovascular accident (CVA)    Age-related nuclear cataract of both eyes    Type 2 diabetes mellitus with diabetic neuropathy, without long-term current use of insulin (HCC)    Carotid artery plaque, bilateral    Chronic diastolic congestive heart failure, NYHA class 1 (HCC)    Diabetes mellitus type 2 without retinopathy (HCC)    Usman,  vitreous, bilateral    Primary osteoarthritis of right knee    Hemarthrosis of right knee    Postmenopausal    Spastic hemiplegia of right dominant side as late effect of cerebral infarction (HCC)    Primary osteoarthritis of both knees    Mild anemia     Allergies:  She has No Known Allergies.    Current Medications:  Outpatient Medications Marked as Taking for the 3/16/24 encounter (Office Visit) with Shawn Mathias MD   Medication Sig    Alcohol Swabs (DROPSAFE ALCOHOL PREP) 70 % Does not apply Pads Apply 1 Application topically As Directed.    atorvastatin 20 MG Oral Tab Take 1 tablet (20 mg total) by mouth nightly.    metoprolol succinate ER 25 MG Oral Tablet 24 Hr Take 1 tablet (25 mg total) by mouth daily.    metFORMIN 1000 MG Oral Tab Take 1 tablet (1,000 mg total) by mouth 2 (two) times daily with meals.    clopidogrel 75 MG Oral Tab Take 1 tablet (75 mg total) by mouth daily.    lisinopril-hydroCHLOROthiazide 20-25 MG Oral Tab Take 1 tablet by mouth daily.    gabapentin 100 MG Oral Cap Take 1 capsule (100 mg total) by mouth nightly.    Glucose Blood (TRUE METRIX BLOOD GLUCOSE TEST) In Vitro Strip TEST BLOOD SUGAR EVERY DAY    Acetaminophen-Codeine (TYLENOL WITH CODEINE #3) 300-30 MG Oral Tab Take 1 tablet by mouth every 12 (twelve) hours as needed for Pain.    LIDOCAINE 5 % External Patch APPLY 1 PATCH TOPICALLY TO THE SKIN EVERY 24 HOURS AS NEEDED    naproxen 500 MG Oral Tab Take 1 tablet (500 mg total) by mouth 2 (two) times daily as needed.    amLODIPine 2.5 MG Oral Tab Take 1 tablet (2.5 mg total) by mouth daily.    Diclofenac Sodium 1 % External Gel Apply 2 g topically 4 (four) times daily.    SHINGRIX 50 MCG/0.5ML Intramuscular Recon Susp     Pyridoxine HCl (VITAMIN B-6 OR) Take by mouth.    CALCIUM OR Take by mouth.    Cholecalciferol (VITAMIN D-3 OR) Take by mouth.    aspirin 81 MG Oral Tab EC TAKE 1 TABLET BY MOUTH EVERY DAY    melatonin 3 MG Oral Tab Take one tab at night 30 min before going to  bed     Current Facility-Administered Medications for the 3/16/24 encounter (Office Visit) with Shawn Mathias MD   Medication    triamcinolone acetonide (Kenalog-40) 40 MG/ML injection 40 mg       Medical History:  She  has a past medical history of Ametropia, Diabetes (HCC), Lipid screening (5/29/2014), Numbness and tingling of leg, Other and unspecified hyperlipidemia, Stroke (HCC) (1/2014), Type II or unspecified type diabetes mellitus without mention of complication, not stated as uncontrolled, and Unspecified essential hypertension.  Surgical History:  She  has a past surgical history that includes hysterectomy; kenn localization wire 1 site right (cpt=19281); cyst aspiration right (Right); colonoscopy; and oophorectomy.   Family History:  Her family history includes Breast Cancer in an other family member; Diabetes in an other family member; Heart Disorder in her mother.  Social History:  She  reports that she has never smoked. She has never used smokeless tobacco. She reports that she does not drink alcohol and does not use drugs.    Tobacco:  She has never smoked tobacco.    CAGE Alcohol Screen:   CAGE screening score of 0 on 3/16/2024, showing low risk of alcohol abuse.      Patient Care Team:  Shawn Mathias MD as PCP - General (Internal Medicine)    Review of Systems   Constitutional: Negative.    HENT: Negative.     Eyes: Negative.    Respiratory: Negative.     Cardiovascular: Negative.    Gastrointestinal: Negative.    Genitourinary: Negative.    Musculoskeletal:  Positive for arthralgias.   Skin: Negative.    Neurological:  Positive for weakness.   Psychiatric/Behavioral: Negative.            Objective:   Physical Exam  Vitals and nursing note reviewed.   Constitutional:       Appearance: She is well-developed.   HENT:      Head: Normocephalic and atraumatic.      Right Ear: External ear normal.      Left Ear: External ear normal.      Nose: Nose normal.   Eyes:      Conjunctiva/sclera: Conjunctivae  normal.      Pupils: Pupils are equal, round, and reactive to light.   Cardiovascular:      Rate and Rhythm: Normal rate and regular rhythm.      Heart sounds: Normal heart sounds.   Pulmonary:      Effort: Pulmonary effort is normal.      Breath sounds: Normal breath sounds.   Abdominal:      General: Bowel sounds are normal.      Palpations: Abdomen is soft.   Genitourinary:     Vagina: Normal.   Musculoskeletal:         General: Tenderness present. Normal range of motion.      Cervical back: Normal range of motion and neck supple.      Comments: Patient with right-sided weakness due to prior stroke   Skin:     General: Skin is warm and dry.   Neurological:      Mental Status: She is alert and oriented to person, place, and time.      Deep Tendon Reflexes: Reflexes are normal and symmetric.   Psychiatric:         Behavior: Behavior normal.         Thought Content: Thought content normal.         Judgment: Judgment normal.            /74 (BP Location: Left arm, Patient Position: Sitting, Cuff Size: adult)   Pulse 59   Temp 97.9 °F (36.6 °C) (Oral)   Resp 16   Ht 5' 3\" (1.6 m)   Wt 153 lb (69.4 kg)   SpO2 98%   BMI 27.10 kg/m²  Estimated body mass index is 27.1 kg/m² as calculated from the following:    Height as of this encounter: 5' 3\" (1.6 m).    Weight as of this encounter: 153 lb (69.4 kg).    Medicare Hearing Assessment:   Hearing Screening    Screening Method: Questionnaire  I have a problem hearing over the telephone: No I have trouble following the conversations when two or more people are talking at the same time: No   I have trouble understanding things on the TV: No I have to strain to understand conversations: No   I have to worry about missing the telephone ring or doorbell: No I have trouble hearing conversations in a noisy background such as a crowded room or restaurant: No   I get confused about where sounds come from: No I misunderstand some words in a sentence and need to ask people  to repeat themselves: No   I especially have trouble understanding the speech of women and children: No I have trouble understanding the speaker in a large room such as at a meeting or place of Christianity: No   Many people I talk to seem to mumble (or don't speak clearly): No People get annoyed because I misunderstand what they say: No   I misunderstand what others are saying and make inappropriate responses: No I avoid social activities because I cannot hear well and fear I will reply improperly: No   Family members and friends have told me they think I may have hearing loss: No             Visual Acuity:   Right Eye Visual Acuity: Corrected Right Eye Chart Acuity: 20/20   Left Eye Visual Acuity: Corrected Left Eye Chart Acuity: 20/20   Both Eyes Visual Acuity: Corrected Both Eyes Chart Acuity: 20/20   Able To Tolerate Visual Acuity: Yes        Assessment & Plan:   Radha August is a 73 year old female who presents for a Medicare Assessment.     1. Medicare annual wellness visit, subsequent (Primary) exam is okay will order labs  -     CBC With Differential With Platelet; Future; Expected date: 03/16/2024  -     Comp Metabolic Panel (14); Future; Expected date: 03/16/2024  -     Lipid Panel; Future; Expected date: 03/16/2024  -     TSH W Reflex To Free T4; Future; Expected date: 03/16/2024  -     Urinalysis, Routine; Future; Expected date: 03/16/2024  -     Hemoglobin A1C; Future; Expected date: 03/16/2024  -     Microalb/Creat Ratio, Random Urine; Future; Expected date: 03/16/2024  -     CBC With Differential With Platelet  -     Comp Metabolic Panel (14)  -     Lipid Panel  -     TSH W Reflex To Free T4  -     Urinalysis, Routine  -     Hemoglobin A1C  -     Microalb/Creat Ratio, Random Urine  2. Hyperlipidemia, unspecified hyperlipidemia type continue current treatment watch diet  -     CBC With Differential With Platelet; Future; Expected date: 03/16/2024  -     Comp Metabolic Panel (14); Future; Expected  date: 03/16/2024  -     Lipid Panel; Future; Expected date: 03/16/2024  -     TSH W Reflex To Free T4; Future; Expected date: 03/16/2024  -     Urinalysis, Routine; Future; Expected date: 03/16/2024  -     Hemoglobin A1C; Future; Expected date: 03/16/2024  -     Microalb/Creat Ratio, Random Urine; Future; Expected date: 03/16/2024  -     CBC With Differential With Platelet  -     Comp Metabolic Panel (14)  -     Lipid Panel  -     TSH W Reflex To Free T4  -     Urinalysis, Routine  -     Hemoglobin A1C  -     Microalb/Creat Ratio, Random Urine  3. Type 2 diabetes mellitus with diabetic neuropathy, without long-term current use of insulin (HCC) we will retest watch diet take medication -     CBC With Differential With Platelet; Future; Expected date: 03/16/2024  -     Comp Metabolic Panel (14); Future; Expected date: 03/16/2024  -     Lipid Panel; Future; Expected date: 03/16/2024  -     TSH W Reflex To Free T4; Future; Expected date: 03/16/2024  -     Urinalysis, Routine; Future; Expected date: 03/16/2024  -     Hemoglobin A1C; Future; Expected date: 03/16/2024  -     Microalb/Creat Ratio, Random Urine; Future; Expected date: 03/16/2024  -     CBC With Differential With Platelet  -     Comp Metabolic Panel (14)  -     Lipid Panel  -     TSH W Reflex To Free T4  -     Urinalysis, Routine  -     Hemoglobin A1C  -     Microalb/Creat Ratio, Random Urine  4. Cerebral artery occlusion with cerebral infarction (HCC)-chronic stable medical management    -     CBC With Differential With Platelet; Future; Expected date: 03/16/2024  -     Comp Metabolic Panel (14); Future; Expected date: 03/16/2024  -     Lipid Panel; Future; Expected date: 03/16/2024  -     TSH W Reflex To Free T4; Future; Expected date: 03/16/2024  -     Urinalysis, Routine; Future; Expected date: 03/16/2024  -     Hemoglobin A1C; Future; Expected date: 03/16/2024  -     Microalb/Creat Ratio, Random Urine; Future; Expected date: 03/16/2024  -     CBC With  Differential With Platelet  -     Comp Metabolic Panel (14)  -     Lipid Panel  -     TSH W Reflex To Free T4  -     Urinalysis, Routine  -     Hemoglobin A1C  -     Microalb/Creat Ratio, Random Urine  5. Abnormality of gait as late effect of cerebrovascular accident (CVA) uses a cane no falls  -     CBC With Differential With Platelet; Future; Expected date: 03/16/2024  -     Comp Metabolic Panel (14); Future; Expected date: 03/16/2024  -     Lipid Panel; Future; Expected date: 03/16/2024  -     TSH W Reflex To Free T4; Future; Expected date: 03/16/2024  -     Urinalysis, Routine; Future; Expected date: 03/16/2024  -     Hemoglobin A1C; Future; Expected date: 03/16/2024  -     Microalb/Creat Ratio, Random Urine; Future; Expected date: 03/16/2024  -     CBC With Differential With Platelet  -     Comp Metabolic Panel (14)  -     Lipid Panel  -     TSH W Reflex To Free T4  -     Urinalysis, Routine  -     Hemoglobin A1C  -     Microalb/Creat Ratio, Random Urine  6. Essential hypertension well-controlled continue current treatment  -     CBC With Differential With Platelet; Future; Expected date: 03/16/2024  -     Comp Metabolic Panel (14); Future; Expected date: 03/16/2024  -     Lipid Panel; Future; Expected date: 03/16/2024  -     TSH W Reflex To Free T4; Future; Expected date: 03/16/2024  -     Urinalysis, Routine; Future; Expected date: 03/16/2024  -     Hemoglobin A1C; Future; Expected date: 03/16/2024  -     Microalb/Creat Ratio, Random Urine; Future; Expected date: 03/16/2024  -     CBC With Differential With Platelet  -     Comp Metabolic Panel (14)  -     Lipid Panel  -     TSH W Reflex To Free T4  -     Urinalysis, Routine  -     Hemoglobin A1C  -     Microalb/Creat Ratio, Random Urine  7. Carotid artery plaque, bilateral stable will repeat test next year  -     CBC With Differential With Platelet; Future; Expected date: 03/16/2024  -     Comp Metabolic Panel (14); Future; Expected date: 03/16/2024  -      Lipid Panel; Future; Expected date: 03/16/2024  -     TSH W Reflex To Free T4; Future; Expected date: 03/16/2024  -     Urinalysis, Routine; Future; Expected date: 03/16/2024  -     Hemoglobin A1C; Future; Expected date: 03/16/2024  -     Microalb/Creat Ratio, Random Urine; Future; Expected date: 03/16/2024  -     CBC With Differential With Platelet  -     Comp Metabolic Panel (14)  -     Lipid Panel  -     TSH W Reflex To Free T4  -     Urinalysis, Routine  -     Hemoglobin A1C  -     Microalb/Creat Ratio, Random Urine  8. Chronic diastolic congestive heart failure, NYHA class 1 (HCC) medical management stable  -     CBC With Differential With Platelet; Future; Expected date: 03/16/2024  -     Comp Metabolic Panel (14); Future; Expected date: 03/16/2024  -     Lipid Panel; Future; Expected date: 03/16/2024  -     TSH W Reflex To Free T4; Future; Expected date: 03/16/2024  -     Urinalysis, Routine; Future; Expected date: 03/16/2024  -     Hemoglobin A1C; Future; Expected date: 03/16/2024  -     Microalb/Creat Ratio, Random Urine; Future; Expected date: 03/16/2024  -     CBC With Differential With Platelet  -     Comp Metabolic Panel (14)  -     Lipid Panel  -     TSH W Reflex To Free T4  -     Urinalysis, Routine  -     Hemoglobin A1C  -     Microalb/Creat Ratio, Random Urine  9. Primary osteoarthritis of right knee medical management follows with physiatry  -     Physiatry Referral - In Network  -     CBC With Differential With Platelet; Future; Expected date: 03/16/2024  -     Comp Metabolic Panel (14); Future; Expected date: 03/16/2024  -     Lipid Panel; Future; Expected date: 03/16/2024  -     TSH W Reflex To Free T4; Future; Expected date: 03/16/2024  -     Urinalysis, Routine; Future; Expected date: 03/16/2024  -     Hemoglobin A1C; Future; Expected date: 03/16/2024  -     Microalb/Creat Ratio, Random Urine; Future; Expected date: 03/16/2024  -     CBC With Differential With Platelet  -     Comp Metabolic  Panel (14)  -     Lipid Panel  -     TSH W Reflex To Free T4  -     Urinalysis, Routine  -     Hemoglobin A1C  -     Microalb/Creat Ratio, Random Urine  10. Chronic pain of right knee as above follows with physiatry  -     Physiatry Referral - In Network  -     CBC With Differential With Platelet; Future; Expected date: 03/16/2024  -     Comp Metabolic Panel (14); Future; Expected date: 03/16/2024  -     Lipid Panel; Future; Expected date: 03/16/2024  -     TSH W Reflex To Free T4; Future; Expected date: 03/16/2024  -     Urinalysis, Routine; Future; Expected date: 03/16/2024  -     Hemoglobin A1C; Future; Expected date: 03/16/2024  -     Microalb/Creat Ratio, Random Urine; Future; Expected date: 03/16/2024  -     CBC With Differential With Platelet  -     Comp Metabolic Panel (14)  -     Lipid Panel  -     TSH W Reflex To Free T4  -     Urinalysis, Routine  -     Hemoglobin A1C  -     Microalb/Creat Ratio, Random Urine  11. Diabetes mellitus type 2 without retinopathy (HCC) follows with eye specialist  12. Age-related nuclear cataract of both eyes follows with eye specialist  13. Spastic hemiplegia of right dominant side as late effect of cerebral infarction (HCC) chronic stable  14. Chronic pain of both knees resting medication for pain  15. Right sided weakness due to prior stroke stable uses cane no falls    The patient indicates understanding of these issues and agrees to the plan.  Reinforced healthy diet, lifestyle, and exercise.            No follow-ups on file.     Shawn Mathias MD, 3/16/2024     Supplementary Documentation:   General Health:  In the past six months, have you lost more than 10 pounds without trying?: 2 - No  Has your appetite been poor?: No  Type of Diet: Balanced;Low Salt  How does the patient maintain a good energy level?: Appropriate Exercise  How would you describe your daily physical activity?: Light  How would you describe your current health state?: Good  How do you maintain positive  mental well-being?: Visiting Family  On a scale of 0 to 10, with 0 being no pain and 10 being severe pain, what is your pain level?: 0 - (None)  In the past six months, have you experienced urine leakage?: 0-No  At any time do you feel concerned for the safety/well-being of yourself and/or your children, in your home or elsewhere?: No  Have you had any immunizations at another office such as Influenza, Hepatitis B, Tetanus, or Pneumococcal?: No       Radha August's SCREENING SCHEDULE   Tests on this list are recommended by your physician but may not be covered, or covered at this frequency, by your insurer.   Please check with your insurance carrier before scheduling to verify coverage.   PREVENTATIVE SERVICES FREQUENCY &  COVERAGE DETAILS LAST COMPLETION DATE   Diabetes Screening    Fasting Blood Sugar /  Glucose    One screening every 12 months if never tested or if previously tested but not diagnosed with pre-diabetes   One screening every 6 months if diagnosed with pre-diabetes Lab Results   Component Value Date     (H) 05/13/2023        Cardiovascular Disease Screening    Lipid Panel  Cholesterol  Lipoprotein (HDL)  Triglycerides Covered every 5 years for all Medicare beneficiaries without apparent signs or symptoms of cardiovascular disease Lab Results   Component Value Date    CHOLEST 129 05/13/2023    HDL 79 (H) 05/13/2023    LDL 41 05/13/2023    TRIG 34 05/13/2023         Electrocardiogram (EKG)   Covered if needed at Welcome to Medicare, and non-screening if indicated for medical reasons 04/19/2019      Ultrasound Screening for Abdominal Aortic Aneurysm (AAA) Covered once in a lifetime for one of the following risk factors    Men who are 65-75 years old and have ever smoked    Anyone with a family history -     Colorectal Cancer Screening  Covered for ages 50-85; only need ONE of the following:    Colonoscopy   Covered every 10 years    Covered every 2 years if patient is at high risk or  previous colonoscopy was abnormal 03/09/2021    Health Maintenance   Topic Date Due    Colorectal Cancer Screening  03/09/2026       Flexible Sigmoidoscopy   Covered every 4 years -    Fecal Occult Blood Test Covered annually -   Bone Density Screening    Bone density screening    Covered every 2 years after age 65 if diagnosed with risk of osteoporosis or estrogen deficiency.    Covered yearly for long-term glucocorticoid medication use (Steroids) Last Dexa Scan:    XR DEXA BONE DENSITOMETRY (CPT=77080) 05/22/2021      No recommendations at this time   Pap and Pelvic    Pap   Covered every 2 years for women at normal risk; Annually if at high risk -  No recommendations at this time    Chlamydia Annually if high risk -  No recommendations at this time   Screening Mammogram    Mammogram     Recommend annually for all female patients aged 40 and older    One baseline mammogram covered for patients aged 35-39 11/03/2023    Health Maintenance   Topic Date Due    Mammogram  11/03/2024       Immunizations    Influenza Covered once per flu season  Please get every year 12/09/2023  No recommendations at this time    Pneumococcal Each vaccine ([id number] & [id number]) covered once after 65 Prevnar 13: 05/13/2016    Phrurbwbw23: 11/17/2017     No recommendations at this time    Hepatitis B One screening covered for patients with certain risk factors   -  No recommendations at this time    Tetanus Toxoid Not covered by Medicare Part B unless medically necessary (cut with metal); may be covered with your pharmacy prescription benefits -    Tetanus, Diptheria and Pertusis TD and TDaP Not covered by Medicare Part B -  No recommendations at this time    Zoster Not covered by Medicare Part B; may be covered with your pharmacy  prescription benefits -  Zoster Vaccines(2 of 2) due on 12/07/2018     Diabetes      Hemoglobin A1C Annually; if last result is elevated, may be asked to retest more frequently.    Medicare covers every 3  months Lab Results   Component Value Date     (H) 12/09/2023    A1C 7.5 (H) 12/09/2023       No recommendations at this time    Creat/alb ratio Annually Lab Results   Component Value Date    MICROALBCREA 9.4 05/13/2023       LDL Annually Lab Results   Component Value Date    LDL 41 05/13/2023       Dilated Eye Exam Annually Last Diabetic Eye Exam:  Last Dilated Eye Exam: 01/16/24  Eye Exam shows Diabetic Retinopathy?: No       Annual Monitoring of Persistent Medications (ACE/ARB, digoxin diuretics, anticonvulsants)    Potassium Annually Lab Results   Component Value Date    K 3.6 05/13/2023         Creatinine   Annually Lab Results   Component Value Date    CREATSERUM 0.84 05/13/2023         BUN Annually Lab Results   Component Value Date    BUN 19 (H) 05/13/2023       Drug Serum Conc Annually No results found for: \"DIGOXIN\", \"DIG\", \"VALP\"

## 2024-04-02 ENCOUNTER — OFFICE VISIT (OUTPATIENT)
Dept: PHYSICAL MEDICINE AND REHAB | Facility: CLINIC | Age: 74
End: 2024-04-02
Payer: MEDICARE

## 2024-04-02 ENCOUNTER — TELEPHONE (OUTPATIENT)
Dept: PHYSICAL MEDICINE AND REHAB | Facility: CLINIC | Age: 74
End: 2024-04-02

## 2024-04-02 ENCOUNTER — MED REC SCAN ONLY (OUTPATIENT)
Dept: PHYSICAL MEDICINE AND REHAB | Facility: CLINIC | Age: 74
End: 2024-04-02

## 2024-04-02 DIAGNOSIS — E11.40 TYPE 2 DIABETES MELLITUS WITH DIABETIC NEUROPATHY, WITHOUT LONG-TERM CURRENT USE OF INSULIN (HCC): ICD-10-CM

## 2024-04-02 DIAGNOSIS — M25.461 EFFUSION OF RIGHT KNEE: ICD-10-CM

## 2024-04-02 DIAGNOSIS — I69.351 SPASTIC HEMIPLEGIA OF RIGHT DOMINANT SIDE AS LATE EFFECT OF CEREBRAL INFARCTION (HCC): ICD-10-CM

## 2024-04-02 DIAGNOSIS — M17.0 PRIMARY OSTEOARTHRITIS OF BOTH KNEES: Primary | ICD-10-CM

## 2024-04-02 NOTE — TELEPHONE ENCOUNTER
Initiated Bilateral knee Synvisc 3/3 w/ possible ultrasound CPT Code: , 07436 Dx: M17.0 to be done in office with site Lawrence GEORGE: Destini MEYER   Status: Pending- clinicals pending.     Paperwork faxed to [phone]    No referral on file, unable to process via [employer].  Will forward to [facility] Centralized RFL pool

## 2024-04-02 NOTE — PROCEDURES
Diagnostic ultrasound  Using a linear array transducer, the right knee was visualized under ultrasound.  There is normal morphology of the lateral patellofemoral joint, there is irregularity of the distal lateral femoral condyle.  No anterior or anterolateral effusion.  There is a very small far lateral effusion in the suprapatellar bursa    Findings:   1.  Abnormal study  2.  Minimal right knee effusion  3.  Bony irregularity around the right knee consistent with osteoarthri      Knee injection with Synvisc #1/3  Location: bilateral  After discussing benefits and possible side effects, we proceeded with a knee injection.  Prior to injection the right knee was visualized with ultrasound and no significant effusion was found.  The patient was consented.  The patient was placed in supine, and the lateral knee was palpated. The skin was sterilely prepped.  Via a lateral approach a 25-gauge needle was introduced into the knee joint. A total of 2 cc of Synvisc was injected into the knee.     The same procedure was repeated on the contralateral side.     The patient tolerated the procedure well without adverse effects.

## 2024-04-02 NOTE — PROGRESS NOTES
[facility]  Progress Note    CHIEF COMPLAINT:    Chief Complaint   Patient presents with    Follow - Up     R knee pain lov 9/20/2024 She got an inejction in August and states tjay it helped. She states that she has fluid in her right knee and would like that checked.        History of Present Illness:  Radha August is a 73 year old female who presents today for follow up for symptoms of bilateral knee pain.  She has a history of knee arthritis.  Last September I performed Synvisc injection of the right knee which helped significantly.  She now has bilateral knee pain.  She previously had an effusion, the knee looks swollen to her and she would like to get that checked out.    PAST MEDICAL HISTORY:  Past Medical History:   Diagnosis Date    Ametropia     Hyperopia    Diabetes (HCC)     Lipid screening 5/29/2014    Numbness and tingling of leg     Other and unspecified hyperlipidemia     Stroke (HCC) 1/2014    Type II or unspecified type diabetes mellitus without mention of complication, not stated as uncontrolled     Unspecified essential hypertension        SURGICAL HISTORY:  Past Surgical History:   Procedure Laterality Date    COLONOSCOPY      5 years ago date unknown    CYST ASPIRATION RIGHT Right     HYSTERECTOMY      [place] LOCALIZATION WIRE 1 SITE RIGHT (CPT=19281)      pt states 20 yrs+ she had a benign cyst removed from her rt breast.     OOPHORECTOMY         SOCIAL HISTORY:   Social History     Occupational History    Not on file   Tobacco Use    Smoking status: Never    Smokeless tobacco: Never   Vaping Use    Vaping Use: Never used   Substance and Sexual Activity    Alcohol use: No    Drug use: No    Sexual activity: Not on file       CURRENT MEDICATIONS:   Current Outpatient Medications   Medication Sig Dispense Refill    Alcohol Swabs (DROPSAFE ALCOHOL PREP) 70 % Does not apply Pads Apply 1 Application topically As Directed. 400 each 3    atorvastatin  20 MG Oral Tab Take 1 tablet (20 mg total) by mouth nightly. 30 tablet 0    metoprolol succinate ER 25 MG Oral Tablet 24 Hr Take 1 tablet (25 mg total) by mouth daily. 30 tablet 0    metFORMIN 1000 MG Oral Tab Take 1 tablet (1,000 mg total) by mouth 2 (two) times daily with meals. 180 tablet 1    clopidogrel 75 MG Oral Tab Take 1 tablet (75 mg total) by mouth daily. 90 tablet 3    lisinopril-hydroCHLOROthiazide 20-25 MG Oral Tab Take 1 tablet by mouth daily. 90 tablet 3    gabapentin 100 MG Oral Cap Take 1 capsule (100 mg total) by mouth nightly. 30 capsule 2    Glucose Blood (TRUE METRIX BLOOD GLUCOSE TEST) In Vitro Strip TEST BLOOD SUGAR EVERY  strip 3    Acetaminophen-Codeine (TYLENOL WITH CODEINE #3) 300-30 MG Oral Tab Take 1 tablet by mouth every 12 (twelve) hours as needed for Pain. 30 tablet 0    LIDOCAINE 5 % External Patch APPLY 1 PATCH TOPICALLY TO THE SKIN EVERY 24 HOURS AS NEEDED 90 patch 0    naproxen 500 MG Oral Tab Take 1 tablet (500 mg total) by mouth 2 (two) times daily as needed. 15 tablet 0    amLODIPine 2.5 MG Oral Tab Take 1 tablet (2.5 mg total) by mouth daily. 30 tablet 2    SHINGRIX 50 MCG/0.5ML Intramuscular Recon Susp       Pyridoxine HCl (VITAMIN B-6 OR) Take by mouth.      CALCIUM OR Take by mouth.      Cholecalciferol (VITAMIN D-3 OR) Take by mouth.      aspirin 81 MG Oral Tab EC TAKE 1 TABLET BY MOUTH EVERY DAY 30 tablet 2    melatonin 3 MG Oral Tab Take one tab at night 30 min before going to bed 30 tablet 0    methylPREDNISolone 4 MG Oral Tablet Therapy Pack Take as directed on package. (Patient not taking: Reported on 7/3/2023) 1 each 0    Blood Glucose Monitoring Suppl (TRUE METRIX AIR GLUCOSE METER) w/Device Does not apply Kit USE AS DIRECTED 1 kit 0    methylPREDNISolone 4 MG Oral Tablet Therapy Pack Take as directed on package. 1 each 0    Diclofenac Sodium 1 % External Gel Apply 2 g topically 4 (four) times daily. (Patient not taking: Reported on 4/2/2024) 1 each 0        ALLERGIES:   No Known Allergies        PHYSICAL EXAM:   There were no vitals taken for this visit.    There is no height or weight on file to calculate BMI.      General: No immediate distress  Extremities: No lower extremity edema bilaterally   Knees: Full left knee range of motion, no effusion, pain with extension overpressure.  On the right no pain with extension overpressure, limited flexion with pain, boggy feeling knee without obvious ballottement or obvious effusion  Neuro:   Cognition: alert & oriented x 3, attentive, able to follow 2 step commands, comprehention intact, spontaneous speech intact  Strength: Lower extremities have 5/5 strength  Sensation: Normal lower extremities  Reflexes: Normal lower extremities  SLR: neg            ASSESSMENT AND PLAN:  1. Primary osteoarthritis of both knees  The patient wished to proceed with repeat Synvisc injections this time in both knees.  I think that is appropriate and we set her up for that.  She will return for series of 3.  The first injection was given today.  - SPECIALTY (OTHER) - EXTERNAL    2. Effusion of right knee  I used the ultrasound to survey the right knee, no significant effusion, very small thimble full over the far lateral aspect of the suprapatellar bursa.  Effusion is essentially resolved.    3. Type 2 diabetes mellitus with diabetic neuropathy, without long-term current use of insulin (HCC)  Avoiding steroids    4. Spastic hemiplegia of right dominant side as late effect of cerebral infarction (HCC)  Impacting gait        RTC 1 week      The patient was in agreement with the assessment and plan.  All questions were answered.        Kevin Ortez MD  Physical Medicine and Rehabilitation/Sports Medicine  [facility]

## 2024-04-09 ENCOUNTER — OFFICE VISIT (OUTPATIENT)
Dept: PHYSICAL MEDICINE AND REHAB | Facility: CLINIC | Age: 74
End: 2024-04-09
Payer: MEDICARE

## 2024-04-09 DIAGNOSIS — M17.0 PRIMARY OSTEOARTHRITIS OF BOTH KNEES: Primary | ICD-10-CM

## 2024-04-09 PROCEDURE — 20610 DRAIN/INJ JOINT/BURSA W/O US: CPT | Performed by: PHYSICAL MEDICINE & REHABILITATION

## 2024-04-09 PROCEDURE — 20611 DRAIN/INJ JOINT/BURSA W/US: CPT | Performed by: PHYSICAL MEDICINE & REHABILITATION

## 2024-04-10 NOTE — PROGRESS NOTES
Knee injection with ultrasound guidance, Synvisc #2/3  Location: bilateral  After discussing benefits and possible side effects, we proceeded with a right knee injection.  An effusion was palpable.  The patient was consented.  The patient was placed in supine, and the lateral knee was palpated. The skin was sterilely prepped.  Using a 13 MHz linear array transducer, ultrasound was used to visualize the lateral knee.  The junction of the patella and distal femur was visualized with a moderate effusion. Under direct ultrasound visualization, a 18-gauge needle was introduced into the knee joint via a lateral approach. 15 ml of mickey colored joint fluid were aspirated.  A total of 2 cc of Synvisc was injected into the knee.     The same procedure was repeated on the contralateral side without ultrasound as there was no effusion on the left.     Permanent images were saved. The patient tolerated the procedure well without adverse effects.  She will return in 1 week for injection #3.

## 2024-04-11 NOTE — TELEPHONE ENCOUNTER
Received Approval from [facility] for Synvisc w/ authorization EOC [id number] valid 4/11/24-12/31/24

## 2024-04-11 NOTE — TELEPHONE ENCOUNTER
Synvisc One, Monovisc, Orthovisc, Supartz FX and Durolane are preferred HA drugs for this members plan    Synvisc is NON Preferred    Patient last had Right knee Orthovisc done 8/9/23, 8/16/23 and 8/23/23    Patient has already began Bilateral Knee Synvisc Injections on 4/2/24 and 4/9/24 without an approval     Spoke to Julia HATHAWAY at [facility] to follow up regarding Bilateral knee Synvisc x3 [facility]  x6  Julia states previous case initiated by MARISABEL Day was not followed up on and was closed out d/t previous denial from 2023  New case initiated  Case #[id number].  Clinicals faxed to [phone]  Status: pending

## 2024-04-16 ENCOUNTER — OFFICE VISIT (OUTPATIENT)
Dept: PHYSICAL MEDICINE AND REHAB | Facility: CLINIC | Age: 74
End: 2024-04-16
Payer: MEDICARE

## 2024-04-16 DIAGNOSIS — M17.0 PRIMARY OSTEOARTHRITIS OF BOTH KNEES: Primary | ICD-10-CM

## 2024-04-16 PROCEDURE — 20610 DRAIN/INJ JOINT/BURSA W/O US: CPT | Performed by: PHYSICAL MEDICINE & REHABILITATION

## 2024-04-16 RX ORDER — LIDOCAINE 50 MG/G
1 PATCH TOPICAL EVERY 24 HOURS
Qty: 90 PATCH | Refills: 0 | Status: SHIPPED | OUTPATIENT
Start: 2024-04-16

## 2024-04-17 ENCOUNTER — MED REC SCAN ONLY (OUTPATIENT)
Dept: NEUROLOGY | Facility: CLINIC | Age: 74
End: 2024-04-17

## 2024-04-18 NOTE — PROCEDURES
Knee injection with ultrasound guidance, Synvisc #3/3  Location: bilateral  After discussing benefits and possible side effects, we proceeded with a right knee injection.  An effusion was palpable.  The patient was consented.  The patient was placed in supine, and the lateral knee was palpated. The skin was sterilely prepped.  Using a 13 MHz linear array transducer, ultrasound was used to visualize the lateral knee.  The junction of the patella and distal femur was visualized with a moderate effusion. Under direct ultrasound visualization, a 18-gauge needle was introduced into the knee joint via a lateral approach. 15 ml of mickey colored joint fluid were aspirated.  A total of 2 cc of Synvisc was injected into the knee.     The same procedure was repeated on the contralateral side without ultrasound as there was no effusion on the left.     Permanent images were saved. The patient tolerated the procedure well without adverse effects.  She will return in 1 week for injection #3.

## 2024-05-17 ENCOUNTER — OFFICE VISIT (OUTPATIENT)
Dept: PHYSICAL MEDICINE AND REHAB | Facility: CLINIC | Age: 74
End: 2024-05-17

## 2024-05-17 VITALS — BODY MASS INDEX: 27.11 KG/M2 | HEIGHT: 63 IN | WEIGHT: 153 LBS

## 2024-05-17 DIAGNOSIS — E11.40 TYPE 2 DIABETES MELLITUS WITH DIABETIC NEUROPATHY, WITHOUT LONG-TERM CURRENT USE OF INSULIN (HCC): ICD-10-CM

## 2024-05-17 DIAGNOSIS — M17.0 PRIMARY OSTEOARTHRITIS OF BOTH KNEES: Primary | ICD-10-CM

## 2024-05-17 DIAGNOSIS — M25.461 EFFUSION OF RIGHT KNEE: ICD-10-CM

## 2024-05-17 PROCEDURE — 99213 OFFICE O/P EST LOW 20 MIN: CPT | Performed by: PHYSICAL MEDICINE & REHABILITATION

## 2024-05-17 PROCEDURE — 1159F MED LIST DOCD IN RCRD: CPT | Performed by: PHYSICAL MEDICINE & REHABILITATION

## 2024-05-17 PROCEDURE — 3008F BODY MASS INDEX DOCD: CPT | Performed by: PHYSICAL MEDICINE & REHABILITATION

## 2024-05-17 PROCEDURE — 20610 DRAIN/INJ JOINT/BURSA W/O US: CPT | Performed by: PHYSICAL MEDICINE & REHABILITATION

## 2024-05-17 RX ORDER — LIDOCAINE HYDROCHLORIDE 10 MG/ML
4 INJECTION, SOLUTION INFILTRATION; PERINEURAL ONCE
Status: COMPLETED | OUTPATIENT
Start: 2024-05-17 | End: 2024-05-17

## 2024-05-17 RX ORDER — TRIAMCINOLONE ACETONIDE 40 MG/ML
80 INJECTION, SUSPENSION INTRA-ARTICULAR; INTRAMUSCULAR ONCE
Status: COMPLETED | OUTPATIENT
Start: 2024-05-17 | End: 2024-05-17

## 2024-05-17 NOTE — PROGRESS NOTES
[facility]  Progress Note    CHIEF COMPLAINT:    Chief Complaint   Patient presents with    Knee Pain     LOV 04/16/2024 Patient presents with Osteoarthritis of both knee. Patient states that the R knee stay swollen and the R >L. Patient states her pain level today 10/10 and she is taking Tylenol and lidocaine patch both gives her a little relief.        History of Present Illness:  Radha August is a 73 year old female who presents today for follow up for symptoms of knee pain.  I performed bilateral Synvisc injections, completing those in mid April.  It has been a month since then.  The left knee is better but the right knee is still swollen and painful.    PAST MEDICAL HISTORY:  Past Medical History:    Ametropia    Hyperopia    Diabetes (HCC)    Lipid screening    Numbness and tingling of leg    Other and unspecified hyperlipidemia    Stroke (HCC)    Type II or unspecified type diabetes mellitus without mention of complication, not stated as uncontrolled    Unspecified essential hypertension       SURGICAL HISTORY:  Past Surgical History:   Procedure Laterality Date    Colonoscopy      5 years ago date unknown    Cyst aspiration right Right     Hysterectomy      [place] localization wire 1 site right (cpt=19281)      pt states 20 yrs+ she had a benign cyst removed from her rt breast.     Oophorectomy         SOCIAL HISTORY:   Social History     Occupational History    Not on file   Tobacco Use    Smoking status: Never    Smokeless tobacco: Never   Vaping Use    Vaping status: Never Used   Substance and Sexual Activity    Alcohol use: No    Drug use: No    Sexual activity: Not on file       CURRENT MEDICATIONS:   Current Outpatient Medications   Medication Sig Dispense Refill    lidocaine 5 % External Patch Place 1 patch onto the skin daily. 90 patch 0    Alcohol Swabs (DROPSAFE ALCOHOL PREP) 70 % Does not apply Pads Apply 1 Application topically As Directed. 400  each 3    atorvastatin 20 MG Oral Tab Take 1 tablet (20 mg total) by mouth nightly. 30 tablet 0    metoprolol succinate ER 25 MG Oral Tablet 24 Hr Take 1 tablet (25 mg total) by mouth daily. 30 tablet 0    metFORMIN 1000 MG Oral Tab Take 1 tablet (1,000 mg total) by mouth 2 (two) times daily with meals. 180 tablet 1    clopidogrel 75 MG Oral Tab Take 1 tablet (75 mg total) by mouth daily. 90 tablet 3    lisinopril-hydroCHLOROthiazide 20-25 MG Oral Tab Take 1 tablet by mouth daily. 90 tablet 3    gabapentin 100 MG Oral Cap Take 1 capsule (100 mg total) by mouth nightly. 30 capsule 2    Glucose Blood (TRUE METRIX BLOOD GLUCOSE TEST) In Vitro Strip TEST BLOOD SUGAR EVERY  strip 3    Acetaminophen-Codeine (TYLENOL WITH CODEINE #3) 300-30 MG Oral Tab Take 1 tablet by mouth every 12 (twelve) hours as needed for Pain. 30 tablet 0    naproxen 500 MG Oral Tab Take 1 tablet (500 mg total) by mouth 2 (two) times daily as needed. 15 tablet 0    amLODIPine 2.5 MG Oral Tab Take 1 tablet (2.5 mg total) by mouth daily. 30 tablet 2    Diclofenac Sodium 1 % External Gel Apply 2 g topically 4 (four) times daily. 1 each 0    SHINGRIX 50 MCG/0.5ML Intramuscular Recon Susp       Pyridoxine HCl (VITAMIN B-6 OR) Take by mouth.      CALCIUM OR Take by mouth.      aspirin 81 MG Oral Tab EC TAKE 1 TABLET BY MOUTH EVERY DAY 30 tablet 2    melatonin 3 MG Oral Tab Take one tab at night 30 min before going to bed 30 tablet 0    methylPREDNISolone 4 MG Oral Tablet Therapy Pack Take as directed on package. (Patient not taking: Reported on 7/3/2023) 1 each 0    Blood Glucose Monitoring Suppl (TRUE METRIX AIR GLUCOSE METER) w/Device Does not apply Kit USE AS DIRECTED 1 kit 0    methylPREDNISolone 4 MG Oral Tablet Therapy Pack Take as directed on package. 1 each 0    Cholecalciferol (VITAMIN D-3 OR) Take by mouth.         ALLERGIES:   No Known Allergies        PHYSICAL EXAM:   Ht 63\"   Wt 153 lb (69.4 kg)   BMI 27.10 kg/m²     Body mass index  is 27.1 kg/m².      General: No immediate distress  Extremities: No lower extremity edema bilaterally   Knees: Moderate effusion, painful range of motion of the right knee  Neuro:   Cognition: alert & oriented x 3, attentive, able to follow 2 step commands, comprehention intact, spontaneous speech intact  Strength: Lower extremities have 5/5 strength          Data    Radiology Imagin.  I reviewed a plain film x-ray of the right knee showing osteophytes of the superior patella and superior aspect of the femoral condyles, joint space narrowing of the medial patellofemoral joint.  2.  A right knee MRI showed chronic meniscal degeneration, tricompartment cartilage loss, significant synovitis and an effusion with a moderately large Baker's cyst and suprapatellar effusion.      ASSESSMENT AND PLAN:  1. Primary osteoarthritis of both knees  We decided to reinject the knee.  Knee replacement might be difficult given her history of stroke and gait with chronic hyperextension of the knee with ambulation.  - lidocaine (Xylocaine) 1 % injection  - triamcinolone acetonide (Kenalog-40) 40 MG/ML injection 80 mg    2. Effusion of right knee  - SPECIALTY (OTHER) - EXTERNAL    3. Type 2 diabetes mellitus with diabetic neuropathy, without long-term current use of insulin (HCC)  Caution with blood sugars, she will monitor          The patient was in agreement with the assessment and plan.  All questions were answered.        Kevin Ortez MD  Physical Medicine and Rehabilitation/Sports Medicine  [facility]

## 2024-05-18 NOTE — LETTER
WHERE IS YOUR PAIN NOW?  Lelo the areas on your body where you feel the described sensations.  Use the appropriate symbol.  Lelo the areas of radiation.  Include all affected areas.  Just to complete the picture, please draw in the face.     ACHE:  ^ ^ ^   NUMBNESS:  0000   PINS & NEEDLES:  = = = =                              ^ ^ ^                       0000              = = = =                                    ^ ^ ^                       0000            = = = =      BURNING:  XXXX   STABBING: ////                  XXXX                ////                         XXXX          ////     Please lelo the line below indicating your degree of pain right now  with 0 being no pain 10 being the worst pain possible.                                         0             1             2              3             4              5              6              7             8             9             10         Patient Signature:             0

## 2024-05-21 ENCOUNTER — TELEPHONE (OUTPATIENT)
Dept: PHYSICAL MEDICINE AND REHAB | Facility: CLINIC | Age: 74
End: 2024-05-21

## 2024-05-21 NOTE — TELEPHONE ENCOUNTER
Initiated retro authorization for Right knee injection [facility] [place] [address],  with [employer]  Status: Approved w/ authorization #[id number] valid 5/17/24-7/15/24      Injection done in office

## 2024-05-29 NOTE — PROCEDURES
Knee injection  Location: right  After discussing benefits and possible side effects, we proceeded with a knee injection. The patient was consented.  The patient was placed in supine, and the lateral knee was palpated. The skin was sterilely prepped.  Via a lateral approach a 22-gauge needle was introduced into the knee joint. A total of 2 cc of 40 mg/ml Kenalog and 4 cc of 1% lidocaine was injected into the knee.     The patient tolerated the procedure well without adverse effects.

## 2024-06-04 NOTE — PROGRESS NOTES
Group Topic: BH Recovery Skills    Date: 6/4/2024  Start Time: 1300  End Time: 1350  Facilitators: Radha Zuluaga    Focus: Recovery Coping Skill Group  Number in attendance: 2    Radha Zuluaga LPC-GARD[facility]    Pt was recruited for group but did not attend. Efforts to encourage participation in programming on the unit will continue.     LOWER EXTREMITY EVALUATION:   Referring Physician: Dr. Jennifer Little  Diagnosis: Chronic pain of right knee (M25.561,G89.29)    Date of Service: 12/20/2019     PATIENT SUMMARY   Maddie Barney is a 71year old y/o female who presents to therapy today with co therapy evaluation with primary c/o chronic R knee swelling and pain related to a stroke January 2014.  The results of the objective tests and measures show a significant loss of RLE strength and stability, poor neuromuscular activation of the R-side, alter gait; significant hyperextension R knee during stance phase     Today’s Treatment and Response:   Pt education was provided on exam findings, treatment diagnosis, treatment plan, expectations, and prognosis.  Pt was also provided recommendations for activit options and has agreed to actively participate in planning and for this course of care. Thank you for your referral. Please co-sign or sign and return this letter via fax as soon as possible to [phone].  If you have any questions, please contact me

## 2024-06-29 ENCOUNTER — OFFICE VISIT (OUTPATIENT)
Dept: INTERNAL MEDICINE CLINIC | Facility: CLINIC | Age: 74
End: 2024-06-29

## 2024-06-29 VITALS
DIASTOLIC BLOOD PRESSURE: 62 MMHG | BODY MASS INDEX: 26.58 KG/M2 | OXYGEN SATURATION: 98 % | HEIGHT: 63 IN | SYSTOLIC BLOOD PRESSURE: 130 MMHG | TEMPERATURE: 98 F | WEIGHT: 150 LBS | RESPIRATION RATE: 17 BRPM | HEART RATE: 66 BPM

## 2024-06-29 DIAGNOSIS — M25.471 EDEMA OF BOTH ANKLES: ICD-10-CM

## 2024-06-29 DIAGNOSIS — E11.40 TYPE 2 DIABETES MELLITUS WITH DIABETIC NEUROPATHY, WITHOUT LONG-TERM CURRENT USE OF INSULIN (HCC): Primary | ICD-10-CM

## 2024-06-29 DIAGNOSIS — G44.219 EPISODIC TENSION-TYPE HEADACHE, NOT INTRACTABLE: ICD-10-CM

## 2024-06-29 DIAGNOSIS — I10 PRIMARY HYPERTENSION: ICD-10-CM

## 2024-06-29 DIAGNOSIS — M25.472 EDEMA OF BOTH ANKLES: ICD-10-CM

## 2024-06-29 LAB
EST. AVERAGE GLUCOSE BLD GHB EST-MCNC: 160 MG/DL (ref 68–126)
HBA1C MFR BLD: 7.2 % (ref ?–5.7)

## 2024-06-29 PROCEDURE — 3008F BODY MASS INDEX DOCD: CPT | Performed by: INTERNAL MEDICINE

## 2024-06-29 PROCEDURE — 1160F RVW MEDS BY RX/DR IN RCRD: CPT | Performed by: INTERNAL MEDICINE

## 2024-06-29 PROCEDURE — 1126F AMNT PAIN NOTED NONE PRSNT: CPT | Performed by: INTERNAL MEDICINE

## 2024-06-29 PROCEDURE — 3051F HG A1C>EQUAL 7.0%<8.0%: CPT | Performed by: INTERNAL MEDICINE

## 2024-06-29 PROCEDURE — 3078F DIAST BP <80 MM HG: CPT | Performed by: INTERNAL MEDICINE

## 2024-06-29 PROCEDURE — 1159F MED LIST DOCD IN RCRD: CPT | Performed by: INTERNAL MEDICINE

## 2024-06-29 PROCEDURE — 36415 COLL VENOUS BLD VENIPUNCTURE: CPT | Performed by: INTERNAL MEDICINE

## 2024-06-29 PROCEDURE — 3075F SYST BP GE 130 - 139MM HG: CPT | Performed by: INTERNAL MEDICINE

## 2024-06-29 PROCEDURE — 99214 OFFICE O/P EST MOD 30 MIN: CPT | Performed by: INTERNAL MEDICINE

## 2024-06-29 NOTE — PROGRESS NOTES
Benadryl    Benadryl Subjective:   Patient ID: Radha August is a 74 year old female.    HPI    Patient comes in today with complaint of fluctuating blood pressure at home also with headaches on and off some swelling to lower extremities feet and ankle patient says she does not really watch her diet as far as how much salt is supposed to be so she does eat out an order in a lot at times blood pressure at home could be 150s 160s and that is when she gets the headaches mostly no chest pain or shortness of breath had recent stress test which was normal    History/Other:   Review of Systems   Constitutional: Negative.    HENT: Negative.     Eyes: Negative.    Respiratory: Negative.     Cardiovascular:  Positive for leg swelling.   Gastrointestinal: Negative.    Genitourinary: Negative.    Musculoskeletal: Negative.    Skin: Negative.    Neurological:  Positive for headaches.   Psychiatric/Behavioral: Negative.       Current Outpatient Medications   Medication Sig Dispense Refill    lidocaine 5 % External Patch Place 1 patch onto the skin daily. 90 patch 0    Alcohol Swabs (DROPSAFE ALCOHOL PREP) 70 % Does not apply Pads Apply 1 Application topically As Directed. 400 each 3    atorvastatin 20 MG Oral Tab Take 1 tablet (20 mg total) by mouth nightly. 30 tablet 0    metoprolol succinate ER 25 MG Oral Tablet 24 Hr Take 1 tablet (25 mg total) by mouth daily. 30 tablet 0    metFORMIN 1000 MG Oral Tab Take 1 tablet (1,000 mg total) by mouth 2 (two) times daily with meals. 180 tablet 1    clopidogrel 75 MG Oral Tab Take 1 tablet (75 mg total) by mouth daily. 90 tablet 3    lisinopril-hydroCHLOROthiazide 20-25 MG Oral Tab Take 1 tablet by mouth daily. 90 tablet 3    gabapentin 100 MG Oral Cap Take 1 capsule (100 mg total) by mouth nightly. 30 capsule 2    Glucose Blood (TRUE METRIX BLOOD GLUCOSE TEST) In Vitro Strip TEST BLOOD SUGAR EVERY  strip 3    Acetaminophen-Codeine (TYLENOL WITH CODEINE #3) 300-30 MG Oral Tab  Health Maintenance Due   Topic Date Due   • Pneumococcal Vaccine 0-64 (1 of 1 - PPSV23) 09/16/1989   • Hepatitis B Vaccine (3 of 3 - 3-dose primary series) 07/11/2005       Patient to discuss.            Take 1 tablet by mouth every 12 (twelve) hours as needed for Pain. 30 tablet 0    naproxen 500 MG Oral Tab Take 1 tablet (500 mg total) by mouth 2 (two) times daily as needed. 15 tablet 0    amLODIPine 2.5 MG Oral Tab Take 1 tablet (2.5 mg total) by mouth daily. 30 tablet 2    Diclofenac Sodium 1 % External Gel Apply 2 g topically 4 (four) times daily. 1 each 0    SHINGRIX 50 MCG/0.5ML Intramuscular Recon Susp       Pyridoxine HCl (VITAMIN B-6 OR) Take by mouth.      CALCIUM OR Take by mouth.      Cholecalciferol (VITAMIN D-3 OR) Take by mouth.      aspirin 81 MG Oral Tab EC TAKE 1 TABLET BY MOUTH EVERY DAY 30 tablet 2    melatonin 3 MG Oral Tab Take one tab at night 30 min before going to bed 30 tablet 0    methylPREDNISolone 4 MG Oral Tablet Therapy Pack Take as directed on package. (Patient not taking: Reported on 7/3/2023) 1 each 0    Blood Glucose Monitoring Suppl (TRUE METRIX AIR GLUCOSE METER) w/Device Does not apply Kit USE AS DIRECTED 1 kit 0    methylPREDNISolone 4 MG Oral Tablet Therapy Pack Take as directed on package. 1 each 0     Allergies:No Known Allergies    Objective:   Physical Exam  Vitals and nursing note reviewed.   Constitutional:       Appearance: She is well-developed.   HENT:      Head: Normocephalic and atraumatic.      Right Ear: External ear normal.      Left Ear: External ear normal.      Nose: Nose normal.   Eyes:      Conjunctiva/sclera: Conjunctivae normal.      Pupils: Pupils are equal, round, and reactive to light.   Cardiovascular:      Rate and Rhythm: Normal rate and regular rhythm.      Heart sounds: Normal heart sounds.   Pulmonary:      Effort: Pulmonary effort is normal.      Breath sounds: Normal breath sounds.   Abdominal:      General: Bowel sounds are normal.      Palpations: Abdomen is soft.   Genitourinary:     Vagina: Normal.   Musculoskeletal:         General: Normal range of motion.      Cervical back: Normal range of motion and neck supple.      Right lower leg:  Edema present.      Left lower leg: Edema present.      Comments: Mil bl feet and ankles    Skin:     General: Skin is warm and dry.   Neurological:      Mental Status: She is alert and oriented to person, place, and time.      Deep Tendon Reflexes: Reflexes are normal and symmetric.   Psychiatric:         Behavior: Behavior normal.         Thought Content: Thought content normal.         Judgment: Judgment normal.         Assessment & Plan:   1. Type 2 diabetes mellitus with diabetic neuropathy, without long-term current use of insulin ([facility]) -will retest watch diet take medication    2. Edema of both ankles.  Need to watch salt intake closer to 2 g a day monitor blood pressure at home monitor edema follow-up with me in few weeks   3. Primary hypertension has been fluctuating at home going high possibly due to salt intake will monitor closely we will follow   4. Episodic tension-type headache, not intractable probably due to above with fluctuation in blood pressure we will monitor and see if blood pressure under control if  still gets the headaches       Orders Placed This Encounter   Procedures    Hemoglobin A1C       Meds This Visit:  Requested Prescriptions      No prescriptions requested or ordered in this encounter       Imaging & Referrals:  None

## 2024-07-08 DIAGNOSIS — I10 ESSENTIAL HYPERTENSION: ICD-10-CM

## 2024-07-10 RX ORDER — CALCIUM CITRATE/VITAMIN D3 200MG-6.25
TABLET ORAL
Qty: 100 STRIP | Refills: 3 | Status: SHIPPED | OUTPATIENT
Start: 2024-07-10

## 2024-07-11 RX ORDER — METOPROLOL SUCCINATE 25 MG/1
25 TABLET, EXTENDED RELEASE ORAL DAILY
Qty: 90 TABLET | Refills: 3 | Status: SHIPPED | OUTPATIENT
Start: 2024-07-11

## 2024-07-11 NOTE — TELEPHONE ENCOUNTER
Routed to Dr Shawn Mathias for advise, thanks.  Last ref metoprolol 12-11-23 # 30      Refill Passed Per Protocol    Requested Prescriptions   Pending Prescriptions Disp Refills    METOPROLOL SUCCINATE ER 25 MG Oral Tablet 24 Hr [Pharmacy Med Name: METOPROLOL SUCCINATE ER 25 MG Tablet Extended Release 24 Hour] 90 tablet 3     Sig: TAKE 1 TABLET EVERY DAY       Hypertension Medications Protocol Passed - 7/8/2024 12:53 AM        Passed - CMP or BMP in past 12 months        Passed - Last BP reading less than 140/90     BP Readings from Last 1 Encounters:   06/29/24 130/62               Passed - In person appointment or virtual visit in the past 12 mos or appointment in next 3 mos     Recent Outpatient Visits              1 week ago Type 2 diabetes mellitus with diabetic neuropathy, without long-term current use of insulin ([facility]    [facility] Visit    1 month ago Primary osteoarthritis of both knees    [facility] [place]    Office Visit    2 months ago Primary osteoarthritis of both knees    [facility], Kevin Suárez MD    Office Visit    3 months ago Primary osteoarthritis of both knees    [facility], Kevin Suárez MD    Office Visit    3 months ago Primary osteoarthritis of both knees    [facility], Kevin Suárez MD    Office Visit          Future Appointments         Provider Department Appt Notes    In 2 days [facility]    In 4 months Sherry James DPM [facility] DM [facility]RLY EXAM    In 6 months Bret Solis [facility] CON[facility], EP/ 1 yr DM EE                    Passed - EGFRCR or GFRAA > 50      GFR Evaluation  EGFRCR: 62 , resulted on 3/16/2024            TRUE METRIX BLOOD GLUCOSE TEST In Vitro Strip [Pharmacy Med Name: TRUE METRIX SELF MONITORING BLOOD GLUCOSE STRIPS   Strip] 100 strip 3     Sig: TEST BLOOD SUGAR EVERY DAY       Diabetic Supplies Protocol Passed - 7/8/2024 12:53 AM        Passed - In person appointment or virtual visit in the past 12 mos or appointment in next 3 mos     Recent Outpatient Visits              1 week ago Type 2 diabetes mellitus with diabetic neuropathy, without long-term current use of insulin (HCC)    [facility]    Office Visit    1 month ago Primary osteoarthritis of both knees    [facility], Kevin Suárez MD    Office Visit    2 months ago Primary osteoarthritis of both knees    [facility], Kevin Suárez MD    Office Visit    3 months ago Primary osteoarthritis of both knees    [facility] Kevin Suárez MD    Office Visit    3 months ago Primary osteoarthritis of both knees    [facility], Kevin Suárez MD    Office Visit          Future Appointments         Provider Department Appt Notes    In 2 days Shawn Mathias MD [facility] bp check w dr    In 4 months [facility]    In 6 months Bret Solis MD [facility] CONF, EP/ 1 yr DM EE                         Future Appointments         Provider Department Appt Notes    In 2 days Shawn Mathias MD [facility] bp check w dr    In 4 months Sherry James DPM [facility] DM YRLY EXAM    In 6 months Bret Solis MD  [facility] [place] CONF, EP/ 1 yr DM EE          Recent Outpatient Visits              1 week ago Type 2 diabetes mellitus with diabetic neuropathy, without long-term current use of insulin (HCC)    [facility] [place] [facility]    Office Visit    1 month ago Primary osteoarthritis of both knees    [facility], Kevin Suárez MD    Office Visit    2 months ago [facility] osteoarthritis of both knees    [facility]    Office Visit    3 months ago Primary osteoarthritis of both knees    [facility], Kevin Suárez MD    Office Visit    3 months ago Primary osteoarthritis of both knees    [facility] [place]    Office Visit

## 2024-07-18 ENCOUNTER — OFFICE VISIT (OUTPATIENT)
Dept: INTERNAL MEDICINE CLINIC | Facility: CLINIC | Age: 74
End: 2024-07-18

## 2024-07-18 VITALS
RESPIRATION RATE: 17 BRPM | HEART RATE: 65 BPM | OXYGEN SATURATION: 100 % | SYSTOLIC BLOOD PRESSURE: 132 MMHG | HEIGHT: 63 IN | BODY MASS INDEX: 26.22 KG/M2 | WEIGHT: 148 LBS | DIASTOLIC BLOOD PRESSURE: 84 MMHG

## 2024-07-18 DIAGNOSIS — R23.8 SCALP IRRITATION: ICD-10-CM

## 2024-07-18 DIAGNOSIS — E11.42 TYPE 2 DIABETES MELLITUS WITH DIABETIC POLYNEUROPATHY, WITHOUT LONG-TERM CURRENT USE OF INSULIN (HCC): Primary | ICD-10-CM

## 2024-07-18 PROCEDURE — 3075F SYST BP GE 130 - 139MM HG: CPT | Performed by: INTERNAL MEDICINE

## 2024-07-18 PROCEDURE — 3008F BODY MASS INDEX DOCD: CPT | Performed by: INTERNAL MEDICINE

## 2024-07-18 PROCEDURE — 1159F MED LIST DOCD IN RCRD: CPT | Performed by: INTERNAL MEDICINE

## 2024-07-18 PROCEDURE — 1160F RVW MEDS BY RX/DR IN RCRD: CPT | Performed by: INTERNAL MEDICINE

## 2024-07-18 PROCEDURE — 99213 OFFICE O/P EST LOW 20 MIN: CPT | Performed by: INTERNAL MEDICINE

## 2024-07-18 PROCEDURE — 3079F DIAST BP 80-89 MM HG: CPT | Performed by: INTERNAL MEDICINE

## 2024-07-18 PROCEDURE — 1125F AMNT PAIN NOTED PAIN PRSNT: CPT | Performed by: INTERNAL MEDICINE

## 2024-07-18 RX ORDER — KETOCONAZOLE 20 MG/ML
1 SHAMPOO TOPICAL
Qty: 100 ML | Refills: 0 | Status: SHIPPED | OUTPATIENT
Start: 2024-07-18

## 2024-07-18 RX ORDER — GABAPENTIN 100 MG/1
200 CAPSULE ORAL NIGHTLY
Qty: 60 CAPSULE | Refills: 2 | Status: SHIPPED | OUTPATIENT
Start: 2024-07-18

## 2024-07-20 NOTE — PROGRESS NOTES
Subjective:     Patient ID: Radha August is a 74 year old female.    HPI    Patient comes in today for follow-up she says that lately she been having increased tingling numbness in bilateral feet sometimes in the hands to ongoing problem in the past was given gabapentin 100 mg but says it has not helped much.  Patient also with irritation of the scalp with areas of dry patches    History/Other:   Review of Systems   Constitutional: Negative.    HENT: Negative.     Eyes: Negative.    Respiratory: Negative.     Cardiovascular: Negative.    Gastrointestinal: Negative.    Genitourinary: Negative.    Musculoskeletal: Negative.    Skin: Negative.         Dry patch to scalp   Neurological:  Positive for numbness.        Bl feet and hand numbness    Psychiatric/Behavioral: Negative.       Current Outpatient Medications   Medication Sig Dispense Refill    ketoconazole 2 % External Shampoo Apply 1 Application topically twice a week. 100 mL 0    gabapentin 100 MG Oral Cap Take 2 capsules (200 mg total) by mouth at bedtime. 60 capsule 2    metoprolol succinate ER 25 MG Oral Tablet 24 Hr Take 1 tablet (25 mg total) by mouth daily. 90 tablet 3    Glucose Blood (TRUE METRIX BLOOD GLUCOSE TEST) In Vitro Strip TEST BLOOD SUGAR EVERY  strip 3    lidocaine 5 % External Patch Place 1 patch onto the skin daily. 90 patch 0    Alcohol Swabs (DROPSAFE ALCOHOL PREP) 70 % Does not apply Pads Apply 1 Application topically As Directed. 400 each 3    atorvastatin 20 MG Oral Tab Take 1 tablet (20 mg total) by mouth nightly. 30 tablet 0    metFORMIN 1000 MG Oral Tab Take 1 tablet (1,000 mg total) by mouth 2 (two) times daily with meals. 180 tablet 1    clopidogrel 75 MG Oral Tab Take 1 tablet (75 mg total) by mouth daily. 90 tablet 3    lisinopril-hydroCHLOROthiazide 20-25 MG Oral Tab Take 1 tablet by mouth daily. 90 tablet 3    Acetaminophen-Codeine (TYLENOL WITH CODEINE #3) 300-30 MG Oral Tab Take 1 tablet by mouth every 12 (twelve)  hours as needed for Pain. 30 tablet 0    naproxen 500 MG Oral Tab Take 1 tablet (500 mg total) by mouth 2 (two) times daily as needed. 15 tablet 0    amLODIPine 2.5 MG Oral Tab Take 1 tablet (2.5 mg total) by mouth daily. 30 tablet 2    Diclofenac Sodium 1 % External Gel Apply 2 g topically 4 (four) times daily. 1 each 0    SHINGRIX 50 MCG/0.5ML Intramuscular Recon Susp       Pyridoxine HCl (VITAMIN B-6 OR) Take by mouth.      CALCIUM OR Take by mouth.      Cholecalciferol (VITAMIN D-3 OR) Take by mouth.      aspirin 81 MG Oral Tab EC TAKE 1 TABLET BY MOUTH EVERY DAY 30 tablet 2    melatonin 3 MG Oral Tab Take one tab at night 30 min before going to bed 30 tablet 0    methylPREDNISolone 4 MG Oral Tablet Therapy Pack Take as directed on package. (Patient not taking: Reported on 7/3/2023) 1 each 0    Blood Glucose Monitoring Suppl (TRUE METRIX AIR GLUCOSE METER) w/Device Does not apply Kit USE AS DIRECTED 1 kit 0    methylPREDNISolone 4 MG Oral Tablet Therapy Pack Take as directed on package. 1 each 0     Allergies:No Known Allergies    Past Medical History:    Ametropia    Hyperopia    Diabetes (HCC)    Lipid screening    Numbness and tingling of leg    Other and unspecified hyperlipidemia    Stroke (HCC)    Type II or unspecified type diabetes mellitus without mention of complication, not stated as uncontrolled    Unspecified essential hypertension      Past Surgical History:   Procedure Laterality Date    Colonoscopy      5 years ago date unknown    Cyst aspiration right Right     Hysterectomy      Brendon localization wire 1 site right (cpt=19281)      pt states 20 yrs+ she had a benign cyst removed from her rt breast.     Oophorectomy        Family History   Problem Relation Age of Onset    Heart Disorder Mother         congestive heart failure    Diabetes Other         Daughter has DM    Breast Cancer Other         maternal cousin    Macular degeneration Neg     Glaucoma Neg       Social History:   Social History      Socioeconomic History    Marital status: Single   Tobacco Use    Smoking status: Never    Smokeless tobacco: Never   Vaping Use    Vaping status: Never Used   Substance and Sexual Activity    Alcohol use: No    Drug use: No   Other Topics Concern    Caffeine Concern No     Social Determinants of Health      [facility]        Objective:   Physical Exam  Vitals and nursing note reviewed.   Constitutional:       Appearance: She is well-developed.   HENT:      Head: Normocephalic and atraumatic.      Right Ear: External ear normal.      Left Ear: External ear normal.      Nose: Nose normal.   Eyes:      Conjunctiva/sclera: Conjunctivae normal.      Pupils: Pupils are equal, round, and reactive to light.   Cardiovascular:      Rate and Rhythm: Normal rate and regular rhythm.      Heart sounds: Normal heart sounds.   Pulmonary:      Effort: Pulmonary effort is normal.      Breath sounds: Normal breath sounds.   Abdominal:      General: Bowel sounds are normal.      Palpations: Abdomen is soft.   Genitourinary:     Vagina: Normal.   Musculoskeletal:         General: Normal range of motion.      Cervical back: Normal range of motion and neck supple.   Skin:     General: Skin is warm and dry.      Comments: Dry scalp patches   Neurological:      Mental Status: She is alert and oriented to person, place, and time.      Deep Tendon Reflexes: Reflexes are normal and symmetric.   Psychiatric:         Behavior: Behavior normal.         Thought Content: Thought content normal.         Judgment: Judgment normal.         Assessment & Plan:   1. Type 2 diabetes mellitus with diabetic polyneuropathy, without long-term current use of insulin ([facility]) increase the gabapentin to 2 tablets if no improvement increase it to 3 tablets nightly and let us know   2. Scalp irritation give ketoconazole shampoo use it as prescribed let us know if not better       No orders of the defined types were placed in this  encounter.      Meds This Visit:  Requested Prescriptions     Signed Prescriptions Disp Refills    ketoconazole 2 % External Shampoo 100 mL 0     Sig: Apply 1 Application topically twice a week.    gabapentin 100 MG Oral Cap 60 capsule 2     Sig: Take 2 capsules (200 mg total) by mouth at bedtime.       Imaging & Referrals:  None

## 2024-07-25 NOTE — TELEPHONE ENCOUNTER
Refill passed per [facility] protocol.    Requested Prescriptions   Pending Prescriptions Disp Refills    METFORMIN 850 MG Oral Tab [Pharmacy Med Name: METFORMIN HYDROCHLORIDE 850 MG Tablet] 270 tablet 3     Sig: TAKE 1 TABLET THREE TIMES DAILY       Diabetes Medication Protocol Passed - 7/22/2024  6:10 PM        Passed - Last A1C < 7.5 and within past 6 months     Lab Results   Component Value Date    A1C 7.2 (H) 06/29/2024             Passed - In person appointment or virtual visit in the past 6 mos or appointment in next 3 mos     Recent Outpatient Visits              6 days ago Type 2 diabetes mellitus with diabetic polyneuropathy, without long-term current use of insulin ([facility])    [facility]    3 weeks ago Type 2 diabetes mellitus with diabetic neuropathy, without long-term current use of insulin ([facility])    [facility]    Office Visit    2 months ago Primary osteoarthritis of both knees    [facility]    Office Visit    3 months ago Primary osteoarthritis of both knees    [facility] Kevin Suárez MD    Office Visit    3 months ago Primary osteoarthritis of both knees    [facility] Kevin Suárez MD    Office Visit          Future Appointments         Provider Department Appt Notes    In 3 months [facility] month    In 4 months Sherry James DPM [facility] DM YRLY EXAM    In 6 months [facility] EP[facility] 1 yr DM EE                    Passed - Microalbumin procedure in past 12 months or taking ACE/ARB        Passed - EGFRCR or GFRAA >  50     GFR Evaluation  EGFRCR: 62 , resulted on 3/16/2024          Passed - GFR in the past 12 months           Future Appointments         Provider Department Appt Notes    In 3 months Shawn Mathias MD [facility] 3 month    In 4 months Sherry James DPM [facility] DM YRLY EXAM    In 6 months Bret Solis MD [facility] CONF, EP/ 1 yr DM EE          Recent Outpatient Visits              6 days ago Type 2 diabetes mellitus with diabetic polyneuropathy, without long-term current use of insulin ([facility])    [facility] Shawn Mathias MD    Office Visit    3 weeks ago Type 2 diabetes mellitus with diabetic neuropathy, without long-term current use of insulin ([facility])    [facility]    Office Visit    2 months ago Primary osteoarthritis of both knees    [facility] Kevin Suárez MD    Office Visit    3 months ago Primary osteoarthritis of both knees    [facility]    Office Visit    3 months ago Primary osteoarthritis of both knees    [facility]    [facility] Visit

## 2024-09-22 DIAGNOSIS — E78.5 HYPERLIPIDEMIA, UNSPECIFIED HYPERLIPIDEMIA TYPE: ICD-10-CM

## 2024-09-27 RX ORDER — LISINOPRIL AND HYDROCHLOROTHIAZIDE 20; 25 MG/1; MG/1
1 TABLET ORAL DAILY
Qty: 90 TABLET | Refills: 3 | Status: SHIPPED | OUTPATIENT
Start: 2024-09-27

## 2024-09-27 RX ORDER — ATORVASTATIN CALCIUM 20 MG/1
20 TABLET, FILM COATED ORAL DAILY
Qty: 90 TABLET | Refills: 3 | Status: SHIPPED | OUTPATIENT
Start: 2024-09-27

## 2024-09-27 RX ORDER — CLOPIDOGREL BISULFATE 75 MG/1
75 TABLET ORAL DAILY
Qty: 90 TABLET | Refills: 3 | Status: SHIPPED | OUTPATIENT
Start: 2024-09-27

## 2024-09-27 NOTE — TELEPHONE ENCOUNTER
Please review.  Clopidogrel Has no protocol.     Requested Prescriptions   Pending Prescriptions Disp Refills    ATORVASTATIN 20 MG Oral Tab [Pharmacy Med Name: Atorvastatin Calcium Oral Tablet 20 MG] 90 tablet 3     Sig: TAKE 1 TABLET EVERY DAY       Cholesterol Medication Protocol Passed - 9/22/2024  7:25 AM        Passed - ALT < 80     Lab Results   Component Value Date    ALT 25 03/16/2024             Passed - ALT resulted within past year        Passed - Lipid panel within past 12 months     Lab Results   Component Value Date    CHOLEST 112 03/16/2024    TRIG 41 03/16/2024    HDL 58 03/16/2024    LDL 43 03/16/2024    VLDL 6 03/16/2024    NONHDLC 54 03/16/2024             Passed - In person appointment or virtual visit in the past 12 mos or appointment in next 3 mos     Recent Outpatient Visits              2 months ago Type 2 diabetes mellitus with diabetic polyneuropathy, without long-term current use of insulin ([facility])    [facility]    Office Visit    3 months ago Type 2 diabetes mellitus with diabetic neuropathy, without long-term current use of insulin ([facility])    [facility]    Office Visit    4 months ago Primary osteoarthritis of both knees    [facility]    Office Visit    5 months ago Primary osteoarthritis of both knees    [facility]    Office Visit    5 months ago Primary osteoarthritis of both knees    [facility] Kevin Su[facility]rez MD    Office Visit          Future Appointments         Provider Department Appt Notes    In 4 weeks [facility]    In 2 months [facility]                       CLOPIDOGREL 75 MG Oral Tab [Pharmacy Med Name: Clopidogrel Bisulfate Oral Tablet 75 MG] 90 tablet 3     Sig: TAKE 1 TABLET EVERY DAY       There is no refill protocol information for this order       LISINOPRIL-HYDROCHLOROTHIAZIDE 20-25 MG Oral Tab [Pharmacy Med Name: Lisinopril-hydroCHLOROthiazide Oral Tablet 20-25 MG] 90 tablet 3     Sig: TAKE 1 TABLET EVERY DAY       Hypertension Medications Protocol Passed - 9/22/2024  7:25 AM        Passed - CMP or BMP in past 12 months        Passed - Last BP reading less than 140/90     BP Readings from Last 1 Encounters:   07/18/24 132/84               Passed - In person appointment or virtual visit in the past 12 mos or appointment in next 3 mos     Recent Outpatient Visits              2 months ago Type 2 diabetes mellitus with diabetic polyneuropathy, without long-term current use of insulin ([facility])    [facility]    [facility]    3 months ago Type 2 diabetes mellitus with diabetic neuropathy, without long-term current use of insulin ([facility])    [facility]    Office Visit    4 months ago Primary osteoarthritis of both knees    [facility]    [facility]    5 months ago Primary osteoarthritis of both knees    [facility]    Office Visit    5 months ago Primary osteoarthritis of both knees    [facility]    [facility]          Future Appointments         Provider Department Appt Notes    In 4 weeks [facility]    In 2 months Sherry James DPM [facility] DM YRLY EXAM                    Passed - EGFRCR or GFRAA > 50     GFR  Evaluation  EGFRCR: 62 , resulted on 3/16/2024             Future Appointments         Provider Department Appt Notes    In 4 weeks Shawn Mathias MD [facility] 3 month    In 2 months [facility]AM          Recent Outpatient Visits              2 months ago Type 2 diabetes mellitus with diabetic polyneuropathy, without long-term current use of insulin ([facility])    [facility]    Office Visit    3 months ago Type 2 diabetes mellitus with diabetic neuropathy, without long-term current use of insulin ([facility])    [facility]    Office Visit    4 months ago Primary osteoarthritis of both knees    [facility]    [facility] Visit    5 months ago Primary osteoarthritis of both knees    [facility] Kevin Su[facility]rez MD    Office Visit    5 months ago Primary osteoarthritis of both knees    [facility]    [facility] Visit

## 2024-10-18 ENCOUNTER — OFFICE VISIT (OUTPATIENT)
Dept: PHYSICAL MEDICINE AND REHAB | Facility: CLINIC | Age: 74
End: 2024-10-18
Payer: MEDICARE

## 2024-10-18 ENCOUNTER — TELEPHONE (OUTPATIENT)
Dept: PHYSICAL MEDICINE AND REHAB | Facility: CLINIC | Age: 74
End: 2024-10-18

## 2024-10-18 VITALS — WEIGHT: 148 LBS | HEIGHT: 63 IN | BODY MASS INDEX: 26.22 KG/M2

## 2024-10-18 DIAGNOSIS — M25.461 EFFUSION OF RIGHT KNEE: ICD-10-CM

## 2024-10-18 DIAGNOSIS — I69.351 SPASTIC HEMIPLEGIA OF RIGHT DOMINANT SIDE AS LATE EFFECT OF CEREBRAL INFARCTION (HCC): ICD-10-CM

## 2024-10-18 DIAGNOSIS — M17.11 PRIMARY OSTEOARTHRITIS OF RIGHT KNEE: Primary | ICD-10-CM

## 2024-10-18 DIAGNOSIS — E11.40 TYPE 2 DIABETES MELLITUS WITH DIABETIC NEUROPATHY, WITHOUT LONG-TERM CURRENT USE OF INSULIN (HCC): ICD-10-CM

## 2024-10-18 PROCEDURE — 3008F BODY MASS INDEX DOCD: CPT | Performed by: PHYSICAL MEDICINE & REHABILITATION

## 2024-10-18 PROCEDURE — 20610 DRAIN/INJ JOINT/BURSA W/O US: CPT | Performed by: PHYSICAL MEDICINE & REHABILITATION

## 2024-10-18 PROCEDURE — 99214 OFFICE O/P EST MOD 30 MIN: CPT | Performed by: PHYSICAL MEDICINE & REHABILITATION

## 2024-10-18 RX ORDER — TRIAMCINOLONE ACETONIDE 40 MG/ML
80 INJECTION, SUSPENSION INTRA-ARTICULAR; INTRAMUSCULAR ONCE
Status: COMPLETED | OUTPATIENT
Start: 2024-10-18 | End: 2024-10-18

## 2024-10-18 RX ORDER — LIDOCAINE HYDROCHLORIDE 10 MG/ML
4 INJECTION, SOLUTION INFILTRATION; PERINEURAL ONCE
Status: COMPLETED | OUTPATIENT
Start: 2024-10-18 | End: 2024-10-18

## 2024-10-18 RX ADMIN — LIDOCAINE HYDROCHLORIDE 4 ML: 10 INJECTION, SOLUTION INFILTRATION; PERINEURAL at 12:02:00

## 2024-10-18 NOTE — PROGRESS NOTES
[facility]  Progress Note    CHIEF COMPLAINT:    Chief Complaint   Patient presents with    Knee Pain     LOV 5/17/24 pt is here for a follow up on knee pain. No N/T. Extra strength tylenol and uses a lidocaine patch but states they don't help. Reports constant soreness in her right knee throughout the day and believe's she has fluid in it.No current physical therapy.  Pain 10/10       History of Present Illness:  Radha August is a 74 year old female who presents today for follow up for symptoms of chronic right knee pain.  She is had several knee injections in the past.  The last series of Synvisc I performed did not help.  I did a right steroid injection in May.  He has also seen Dr. Lujan in the past.  The knee is sore and she is requesting another injection.    PAST MEDICAL HISTORY:  Past Medical History:    Ametropia    Hyperopia    Diabetes (HCC)    Lipid screening    Numbness and tingling of leg    Other and unspecified hyperlipidemia    Stroke (HCC)    Type II or unspecified type diabetes mellitus without mention of complication, not stated as uncontrolled    Unspecified essential hypertension       SURGICAL HISTORY:  Past Surgical History:   Procedure Laterality Date    Colonoscopy      5 years ago date unknown    Cyst aspiration right Right     Hysterectomy      Brendon localization wire 1 site right (cpt=19281)      pt states 20 yrs+ she had a benign cyst removed from her rt breast.     Oophorectomy         SOCIAL HISTORY:   Social History     Occupational History    Not on file   Tobacco Use    Smoking status: Never    Smokeless tobacco: Never   Vaping Use    Vaping status: Never Used   Substance and Sexual Activity    Alcohol use: No    Drug use: No    Sexual activity: Not on file       CURRENT MEDICATIONS:   Current Outpatient Medications   Medication Sig Dispense Refill    metFORMIN 500 MG Oral Tab Take 1 tablet (500 mg total) by mouth 2 (two) times  daily with meals. 60 tablet 2    glipiZIDE ER 2.5 MG Oral Tablet 24 Hr Take 1 tablet (2.5 mg total) by mouth daily with breakfast. 90 tablet 1    gabapentin 100 MG Oral Cap Take 2 capsules (200 mg total) by mouth at bedtime. 180 capsule 1    atorvastatin 20 MG Oral Tab Take 1 tablet (20 mg total) by mouth daily. 90 tablet 3    clopidogrel 75 MG Oral Tab Take 1 tablet (75 mg total) by mouth daily. 90 tablet 3    lisinopril-hydroCHLOROthiazide 20-25 MG Oral Tab Take 1 tablet by mouth daily. 90 tablet 3    ketoconazole 2 % External Shampoo Apply 1 Application topically twice a week. 100 mL 0    metoprolol succinate ER 25 MG Oral Tablet 24 Hr Take 1 tablet (25 mg total) by mouth daily. 90 tablet 3    Glucose Blood (TRUE METRIX BLOOD GLUCOSE TEST) In Vitro Strip TEST BLOOD SUGAR EVERY  strip 3    lidocaine 5 % External Patch Place 1 patch onto the skin daily. 90 patch 0    Alcohol Swabs (DROPSAFE ALCOHOL PREP) 70 % Does not apply Pads Apply 1 Application topically As Directed. 400 each 3    Blood Glucose Monitoring Suppl (TRUE METRIX AIR GLUCOSE METER) w/Device Does not apply Kit USE AS DIRECTED 1 kit 0    Acetaminophen-Codeine (TYLENOL WITH CODEINE #3) 300-30 MG Oral Tab Take 1 tablet by mouth every 12 (twelve) hours as needed for Pain. 30 tablet 0    naproxen 500 MG Oral Tab Take 1 tablet (500 mg total) by mouth 2 (two) times daily as needed. 15 tablet 0    amLODIPine 2.5 MG Oral Tab Take 1 tablet (2.5 mg total) by mouth daily. 30 tablet 2    Diclofenac Sodium 1 % External Gel Apply 2 g topically 4 (four) times daily. 1 each 0    SHINGRIX 50 MCG/0.5ML Intramuscular Recon Susp       Pyridoxine HCl (VITAMIN B-6 OR) Take by mouth.      CALCIUM OR Take by mouth.      Cholecalciferol (VITAMIN D-3 OR) Take by mouth.      aspirin 81 MG Oral Tab EC TAKE 1 TABLET BY MOUTH EVERY DAY 30 tablet 2    melatonin 3 MG Oral Tab Take one tab at night 30 min before going to bed 30 tablet 0       ALLERGIES:    Allergies[1]        PHYSICAL EXAM:   Ht 63\"   Wt 148 lb (67.1 kg)   BMI 26.22 kg/m²     Body mass index is 26.22 kg/m².      General: No immediate distress  Extremities: No lower extremity edema bilaterally   Knees: Right knee with painful range of motion and effusion  Neuro:   Cognition: alert & oriented x 3, attentive, able to follow 2 step commands, comprehention intact, spontaneous speech intact  Strength: Hemiparesis due to stroke  Sensation: Normal lower extremities          Data    Radiology Imagin.  I reviewed a plain film x-ray of the right knee showing osteophytes of the superior patella and superior aspect of the femoral condyles, joint space narrowing of the medial patellofemoral joint.  2.  A right knee MRI showed chronic meniscal degeneration, tricompartment cartilage loss, significant synovitis and an effusion with a moderately large Baker's cyst and suprapatellar effusion.      ASSESSMENT AND PLAN:  1. Primary osteoarthritis of right knee  I performed a right knee injection today.  This seems to be the only thing that works for her.  Not a great candidate for knee replacement given stroke related hyperextension gait.  - lidocaine (Xylocaine) 1 % injection  - triamcinolone acetonide (Kenalog-40) 40 MG/ML injection 80 mg    2. Effusion of right knee  As above    3. Type 2 diabetes mellitus with diabetic neuropathy, without long-term current use of insulin (HCC)  Caution with blood sugars    4. Spastic hemiplegia of right dominant side as late effect of cerebral infarction (HCC)  Stable        RTC as needed      The patient was in agreement with the assessment and plan.  All questions were answered.        Kevin Ortez MD  Physical Medicine and Rehabilitation/Sports Medicine  [facility]           [1] No Known Allergies

## 2024-10-18 NOTE — TELEPHONE ENCOUNTER
Initiated authorization for Right knee injection and aspiration. CPT/HCPCS 27209,  dx:M17.0 with [employer]  Completed in the office today    Status: Approved - no auth required  Authorization is not required based on medical necessity when being performed, however is not a guarantee of payment and may be subject to review once claim is submitted-Covered Benefit

## 2024-10-26 ENCOUNTER — OFFICE VISIT (OUTPATIENT)
Dept: INTERNAL MEDICINE CLINIC | Facility: CLINIC | Age: 74
End: 2024-10-26

## 2024-10-26 VITALS
DIASTOLIC BLOOD PRESSURE: 62 MMHG | TEMPERATURE: 98 F | HEART RATE: 76 BPM | BODY MASS INDEX: 27.29 KG/M2 | SYSTOLIC BLOOD PRESSURE: 124 MMHG | HEIGHT: 63 IN | WEIGHT: 154 LBS | RESPIRATION RATE: 18 BRPM | OXYGEN SATURATION: 97 %

## 2024-10-26 DIAGNOSIS — I10 ESSENTIAL HYPERTENSION: ICD-10-CM

## 2024-10-26 DIAGNOSIS — Z12.31 SCREENING MAMMOGRAM FOR BREAST CANCER: ICD-10-CM

## 2024-10-26 DIAGNOSIS — E11.40 TYPE 2 DIABETES MELLITUS WITH DIABETIC NEUROPATHY, WITHOUT LONG-TERM CURRENT USE OF INSULIN (HCC): Primary | ICD-10-CM

## 2024-10-26 DIAGNOSIS — E78.5 HYPERLIPIDEMIA, UNSPECIFIED HYPERLIPIDEMIA TYPE: ICD-10-CM

## 2024-10-26 DIAGNOSIS — E11.9 COMPREHENSIVE DIABETIC FOOT EXAMINATION, TYPE 2 DM, ENCOUNTER FOR (HCC): ICD-10-CM

## 2024-10-26 LAB
ANION GAP SERPL CALC-SCNC: 8 MMOL/L (ref 0–18)
BUN BLD-MCNC: 18 MG/DL (ref 9–23)
BUN/CREAT SERPL: 18.4 (ref 10–20)
CALCIUM BLD-MCNC: 10.3 MG/DL (ref 8.7–10.4)
CHLORIDE SERPL-SCNC: 106 MMOL/L (ref 98–112)
CHOLEST SERPL-MCNC: 123 MG/DL (ref ?–200)
CO2 SERPL-SCNC: 27 MMOL/L (ref 21–32)
CREAT BLD-MCNC: 0.98 MG/DL
EGFRCR SERPLBLD CKD-EPI 2021: 61 ML/MIN/1.73M2 (ref 60–?)
EST. AVERAGE GLUCOSE BLD GHB EST-MCNC: 169 MG/DL (ref 68–126)
FASTING PATIENT LIPID ANSWER: YES
FASTING STATUS PATIENT QL REPORTED: YES
GLUCOSE BLD-MCNC: 148 MG/DL (ref 70–99)
HBA1C MFR BLD: 7.5 % (ref ?–5.7)
HDLC SERPL-MCNC: 61 MG/DL (ref 40–59)
LDLC SERPL CALC-MCNC: 51 MG/DL (ref ?–100)
NONHDLC SERPL-MCNC: 62 MG/DL (ref ?–130)
OSMOLALITY SERPL CALC.SUM OF ELEC: 297 MOSM/KG (ref 275–295)
POTASSIUM SERPL-SCNC: 3.9 MMOL/L (ref 3.5–5.1)
SODIUM SERPL-SCNC: 141 MMOL/L (ref 136–145)
TRIGL SERPL-MCNC: 43 MG/DL (ref 30–149)
VLDLC SERPL CALC-MCNC: 6 MG/DL (ref 0–30)

## 2024-10-26 PROCEDURE — 3078F DIAST BP <80 MM HG: CPT | Performed by: INTERNAL MEDICINE

## 2024-10-26 PROCEDURE — 3074F SYST BP LT 130 MM HG: CPT | Performed by: INTERNAL MEDICINE

## 2024-10-26 PROCEDURE — 99214 OFFICE O/P EST MOD 30 MIN: CPT | Performed by: INTERNAL MEDICINE

## 2024-10-26 PROCEDURE — 3008F BODY MASS INDEX DOCD: CPT | Performed by: INTERNAL MEDICINE

## 2024-10-26 PROCEDURE — G0008 ADMIN INFLUENZA VIRUS VAC: HCPCS | Performed by: INTERNAL MEDICINE

## 2024-10-26 PROCEDURE — 3051F HG A1C>EQUAL 7.0%<8.0%: CPT | Performed by: INTERNAL MEDICINE

## 2024-10-26 PROCEDURE — 90662 IIV NO PRSV INCREASED AG IM: CPT | Performed by: INTERNAL MEDICINE

## 2024-10-26 PROCEDURE — 36415 COLL VENOUS BLD VENIPUNCTURE: CPT | Performed by: INTERNAL MEDICINE

## 2024-10-26 PROCEDURE — 1126F AMNT PAIN NOTED NONE PRSNT: CPT | Performed by: INTERNAL MEDICINE

## 2024-10-28 RX ORDER — GLIPIZIDE 2.5 MG/1
2.5 TABLET, EXTENDED RELEASE ORAL
Qty: 90 TABLET | Refills: 1 | Status: SHIPPED | OUTPATIENT
Start: 2024-10-28

## 2024-10-29 RX ORDER — GABAPENTIN 100 MG/1
200 CAPSULE ORAL NIGHTLY
Qty: 180 CAPSULE | Refills: 1 | Status: SHIPPED | OUTPATIENT
Start: 2024-10-29

## 2024-10-29 NOTE — TELEPHONE ENCOUNTER
Refill passed per [facility] protocol.  Requested Prescriptions   Pending Prescriptions Disp Refills    gabapentin 100 MG Oral Cap 60 capsule 2     Sig: Take 2 capsules (200 mg total) by mouth at bedtime.       Neurology Medications Passed - 10/29/2024  3:26 PM        Passed - In person appointment or virtual visit in the past 6 mos or appointment in next 3 mos     Recent Outpatient Visits              3 days ago Type 2 diabetes mellitus with diabetic neuropathy, without long-term current use of insulin ([facility])    [facility], Shawn Macias MD    Office Visit    1 week ago Primary osteoarthritis of both knees    [facility], Kevin Suárez MD    Office Visit    3 months ago Type 2 diabetes mellitus with diabetic polyneuropathy, without long-term current use of insulin ([facility])    [facility], Shawn Macias MD    Office Visit    4 months ago Type 2 diabetes mellitus with diabetic neuropathy, without long-term current use of insulin ([facility]    Office Visit    5 months ago Primary osteoarthritis of both knees    [facility], Kevin Suárez MD    Office Visit          Future Appointments         Provider Department Appt Notes    In 6 days [address] Mammography [facility]     In 3 weeks Lacy Ag DPM [facility] R/S 12-5-24 - Dr. BURGER / DM YRLY EXAM **Pt will be getting a NEW REF.                       Future Appointments         Provider Department Appt Notes    In 6 days [address]     In 3 weeks Lacy Ag DPM [facility] R/S 12-5-24 - Dr. BURGER / DM YRLY EXAM **Pt will be getting a NEW REF.          Recent  Outpatient Visits              3 days ago Type 2 diabetes mellitus with diabetic neuropathy, without long-term current use of insulin ([facility])    [facility], Shawn Macias MD    Office Visit    1 week ago Primary osteoarthritis of both knees    [facility], Kevin Suárez MD    Office Visit    3 months ago Type 2 diabetes mellitus with diabetic polyneuropathy, without long-term current use of insulin ([facility])    [facility], Shawn Macias MD    Office Visit    4 months ago Type 2 diabetes mellitus with diabetic neuropathy, without long-term current use of insulin ([facility])    [facility], Shawn Macias MD    Office Visit    5 months ago Primary osteoarthritis of both knees    [facility], Kevin Suárez MD    Office Visit

## 2024-10-31 NOTE — PROGRESS NOTES
Subjective:     Patient ID: Radha August is a 74 year old female.    HPI    Patient comes in for follow-up overall doing okay except for continues to have too much GI issues diarrhea with metformin wants to cut it down    History/Other:   Review of Systems   Constitutional: Negative.    HENT: Negative.     Eyes: Negative.    Respiratory: Negative.     Cardiovascular: Negative.    Gastrointestinal:  Positive for diarrhea.   Genitourinary: Negative.    Musculoskeletal: Negative.    Skin: Negative.    Neurological: Negative.    Psychiatric/Behavioral: Negative.       Current Outpatient Medications   Medication Sig Dispense Refill    metFORMIN 500 MG Oral Tab Take 1 tablet (500 mg total) by mouth 2 (two) times daily with meals. 60 tablet 2    glipiZIDE ER 2.5 MG Oral Tablet 24 Hr Take 1 tablet (2.5 mg total) by mouth daily with breakfast. 90 tablet 1    atorvastatin 20 MG Oral Tab Take 1 tablet (20 mg total) by mouth daily. 90 tablet 3    clopidogrel 75 MG Oral Tab Take 1 tablet (75 mg total) by mouth daily. 90 tablet 3    lisinopril-hydroCHLOROthiazide 20-25 MG Oral Tab Take 1 tablet by mouth daily. 90 tablet 3    ketoconazole 2 % External Shampoo Apply 1 Application topically twice a week. 100 mL 0    metoprolol succinate ER 25 MG Oral Tablet 24 Hr Take 1 tablet (25 mg total) by mouth daily. 90 tablet 3    Glucose Blood (TRUE METRIX BLOOD GLUCOSE TEST) In Vitro Strip TEST BLOOD SUGAR EVERY  strip 3    lidocaine 5 % External Patch Place 1 patch onto the skin daily. 90 patch 0    Alcohol Swabs (DROPSAFE ALCOHOL PREP) 70 % Does not apply Pads Apply 1 Application topically As Directed. 400 each 3    Acetaminophen-Codeine (TYLENOL WITH CODEINE #3) 300-30 MG Oral Tab Take 1 tablet by mouth every 12 (twelve) hours as needed for Pain. 30 tablet 0    naproxen 500 MG Oral Tab Take 1 tablet (500 mg total) by mouth 2 (two) times daily as needed. 15 tablet 0    amLODIPine 2.5 MG Oral Tab Take 1 tablet (2.5 mg total) by  mouth daily. 30 tablet 2    Diclofenac Sodium 1 % External Gel Apply 2 g topically 4 (four) times daily. 1 each 0    SHINGRIX 50 MCG/0.5ML Intramuscular Recon Susp       Pyridoxine HCl (VITAMIN B-6 OR) Take by mouth.      CALCIUM OR Take by mouth.      Cholecalciferol (VITAMIN D-3 OR) Take by mouth.      aspirin 81 MG Oral Tab EC TAKE 1 TABLET BY MOUTH EVERY DAY 30 tablet 2    melatonin 3 MG Oral Tab Take one tab at night 30 min before going to bed 30 tablet 0    gabapentin 100 MG Oral Cap Take 2 capsules (200 mg total) by mouth at bedtime. 180 capsule 1    Blood Glucose Monitoring Suppl (TRUE METRIX AIR GLUCOSE METER) w/Device Does not apply Kit USE AS DIRECTED 1 kit 0     Allergies:Allergies[1]    Past Medical History:    Ametropia    Hyperopia    Diabetes (HCC)    Lipid screening    Numbness and tingling of leg    Other and unspecified hyperlipidemia    Stroke (HCC)    Type II or unspecified type diabetes mellitus without mention of complication, not stated as uncontrolled    Unspecified essential hypertension      Past Surgical History:   Procedure Laterality Date    Colonoscopy      5 years ago date unknown    Cyst aspiration right Right     Hysterectomy      Brendon localization wire 1 site right (cpt=19281)      pt states 20 yrs+ she had a benign cyst removed from her rt breast.     Oophorectomy        Family History   Problem Relation Age of Onset    Heart Disorder Mother         congestive heart failure    Diabetes Other         Daughter has DM    Breast Cancer Other         maternal cousin    Macular degeneration Neg     Glaucoma Neg       Social History:   Social History     Socioeconomic History    Marital status: Single   Tobacco Use    Smoking status: Never    Smokeless tobacco: Never   Vaping Use    Vaping status: Never Used   Substance and Sexual Activity    Alcohol use: No    Drug use: No   Other Topics Concern    Caffeine Concern No     Social Drivers of Health      Received from [employer],  [facility]        Objective:   Physical Exam  Vitals and nursing note reviewed.   Constitutional:       Appearance: She is well-developed.   HENT:      Head: Normocephalic and atraumatic.      Right Ear: External ear normal.      Left Ear: External ear normal.      Nose: Nose normal.   Eyes:      Conjunctiva/sclera: Conjunctivae normal.      Pupils: Pupils are equal, round, and reactive to light.   Cardiovascular:      Rate and Rhythm: Normal rate and regular rhythm.      Heart sounds: Normal heart sounds.   Pulmonary:      Effort: Pulmonary effort is normal.      Breath sounds: Normal breath sounds.   Abdominal:      General: Bowel sounds are normal.      Palpations: Abdomen is soft.   Genitourinary:     Vagina: Normal.   Musculoskeletal:         General: Normal range of motion.      Cervical back: Normal range of motion and neck supple.      Comments: Bilateral barefoot skin diabetic exam is normal, visualized feet and the appearance is normal.  Bilateral monofilament/sensation of both feet is normal.  Pulsation pedal pulse exam of both lower legs/feet is normal as well.         Skin:     General: Skin is warm and dry.   Neurological:      Mental Status: She is alert and oriented to person, place, and time.      Deep Tendon Reflexes: Reflexes are normal and symmetric.   Psychiatric:         Behavior: Behavior normal.         Thought Content: Thought content normal.         Judgment: Judgment normal.         Assessment & Plan:   1. Type 2 diabetes mellitus with diabetic neuropathy, without long-term current use of insulin ([facility]) will check A1c will cut down on metformin to 500  Depending on results we will decide if we need to add another medication   2. Hyperlipidemia, unspecified hyperlipidemia type continue current treatment we will retest    3. Essential hypertension well-controlled continue current treatment   4. Screening mammogram for breast cancer we will order mammogram   5. Comprehensive  diabetic foot examination, type 2 DM, encounter for (HCC) Bilateral barefoot skin diabetic exam is normal, visualized feet and the appearance is normal.  Bilateral monofilament/sensation of both feet is normal.  Pulsation pedal pulse exam of both lower legs/feet is normal as well.           Orders Placed This Encounter   Procedures    Hemoglobin A1C    Lipid Panel    Basic Metabolic Panel (8) [E]    INFLUENZA VAC HIGH DOSE PRSV FREE       Meds This Visit:  Requested Prescriptions      No prescriptions requested or ordered in this encounter       Imaging & Referrals:  INFLUENZA VAC HIGH DOSE PRSV FREE  OPHTHALMOLOGY - INTERNAL  CLIFTON SAIRA 2D+3D SCREENING BILAT (CPT=77067/46539)            [1] No Known Allergies

## 2024-10-31 NOTE — PROCEDURES
Knee injection   Location: Right  After discussing benefits and possible side effects, we proceeded with a right knee injection.  An effusion was palpable.  The patient was consented.  The patient was placed in supine, and the lateral knee was palpated. The skin was sterilely prepped.  There was a moderate effusion. An 18-gauge needle was introduced into the knee joint via a lateral approach. 13 ml of mickey colored joint fluid were aspirated.  A total of 2 cc 40 mg/mL Kenalog and 4 cc of 1% lidocaine was injected into the knee.     The patient tolerated the procedure well without adverse effects.

## 2024-11-04 ENCOUNTER — HOSPITAL ENCOUNTER (OUTPATIENT)
Dept: MAMMOGRAPHY | Age: 74
Discharge: HOME | End: 2024-11-04
Attending: INTERNAL MEDICINE
Payer: MEDICARE

## 2024-11-04 ENCOUNTER — TELEPHONE (OUTPATIENT)
Dept: PHYSICAL MEDICINE AND REHAB | Facility: CLINIC | Age: 74
End: 2024-11-04

## 2024-11-04 DIAGNOSIS — Z12.31 SCREENING MAMMOGRAM FOR BREAST CANCER: ICD-10-CM

## 2024-11-04 PROCEDURE — 77067 SCR MAMMO BI INCL CAD: CPT | Performed by: INTERNAL MEDICINE

## 2024-11-04 PROCEDURE — 77063 BREAST TOMOSYNTHESIS BI: CPT | Performed by: INTERNAL MEDICINE

## 2024-11-04 NOTE — TELEPHONE ENCOUNTER
Received fax approval for Synvisc for the year 2025. Approval granted for anytime of the year.  Status: Approved  Reference/Authorization # [id number]  Valid: 01/01/2025-12/31/2025

## 2024-11-05 ENCOUNTER — MED REC SCAN ONLY (OUTPATIENT)
Dept: PHYSICAL MEDICINE AND REHAB | Facility: CLINIC | Age: 74
End: 2024-11-05

## 2024-11-18 ENCOUNTER — TELEPHONE (OUTPATIENT)
Dept: CASE MANAGEMENT | Age: 74
End: 2024-11-18

## 2024-11-18 DIAGNOSIS — E11.42 TYPE 2 DIABETES MELLITUS WITH DIABETIC POLYNEUROPATHY, WITHOUT LONG-TERM CURRENT USE OF INSULIN (HCC): Primary | ICD-10-CM

## 2024-11-18 DIAGNOSIS — E11.9 ENCOUNTER FOR DIABETIC FOOT EXAM (HCC): ICD-10-CM

## 2024-11-18 NOTE — TELEPHONE ENCOUNTER
Dr. Shawn Mathias,     Patient called requesting referral to Dr. Ag.     Pended referral please review diagnosis and sign off if you agree.    Thank you.  Alcira Maxwell  Managed Care

## 2024-11-22 ENCOUNTER — OFFICE VISIT (OUTPATIENT)
Dept: PODIATRY CLINIC | Facility: CLINIC | Age: 74
End: 2024-11-22
Payer: MEDICARE

## 2024-11-22 DIAGNOSIS — B35.1 PAIN DUE TO ONYCHOMYCOSIS OF TOENAILS OF BOTH FEET: ICD-10-CM

## 2024-11-22 DIAGNOSIS — M79.675 PAIN DUE TO ONYCHOMYCOSIS OF TOENAILS OF BOTH FEET: ICD-10-CM

## 2024-11-22 DIAGNOSIS — Z79.4 TYPE 2 DIABETES MELLITUS WITHOUT COMPLICATION, WITH LONG-TERM CURRENT USE OF INSULIN (HCC): Primary | ICD-10-CM

## 2024-11-22 DIAGNOSIS — M79.674 PAIN DUE TO ONYCHOMYCOSIS OF TOENAILS OF BOTH FEET: ICD-10-CM

## 2024-11-22 DIAGNOSIS — E11.9 TYPE 2 DIABETES MELLITUS WITHOUT COMPLICATION, WITH LONG-TERM CURRENT USE OF INSULIN (HCC): Primary | ICD-10-CM

## 2024-11-22 NOTE — PROGRESS NOTES
Reason for Visit      Radha August is a 74 year old female presents today complaining of bilateral diabetic foot evaluation.     History of Present Illness     Presents to clinic today after last being seen by podiatry on 11/30/2023 for for routine diabetic foot evaluation.  Patient is a non-insulin-dependent diabetic whose last A1c was 7.5 on 10/26/24. Patient presents complaining of painful, elongated nails that she is unable to debride himself.    The following portions of the patient's history were reviewed and updated as appropriate: allergies, current medications, past family history, past medical history, past social history, past surgical history and problem list.    Allergies[1]      Current Outpatient Medications:     gabapentin 100 MG Oral Cap, Take 2 capsules (200 mg total) by mouth at bedtime., Disp: 180 capsule, Rfl: 1    metFORMIN 500 MG Oral Tab, Take 1 tablet (500 mg total) by mouth 2 (two) times daily with meals., Disp: 60 tablet, Rfl: 2    glipiZIDE ER 2.5 MG Oral Tablet 24 Hr, Take 1 tablet (2.5 mg total) by mouth daily with breakfast., Disp: 90 tablet, Rfl: 1    atorvastatin 20 MG Oral Tab, Take 1 tablet (20 mg total) by mouth daily., Disp: 90 tablet, Rfl: 3    clopidogrel 75 MG Oral Tab, Take 1 tablet (75 mg total) by mouth daily., Disp: 90 tablet, Rfl: 3    lisinopril-hydroCHLOROthiazide 20-25 MG Oral Tab, Take 1 tablet by mouth daily., Disp: 90 tablet, Rfl: 3    ketoconazole 2 % External Shampoo, Apply 1 Application topically twice a week., Disp: 100 mL, Rfl: 0    metoprolol succinate ER 25 MG Oral Tablet 24 Hr, Take 1 tablet (25 mg total) by mouth daily., Disp: 90 tablet, Rfl: 3    Glucose Blood (TRUE METRIX BLOOD GLUCOSE TEST) In Vitro Strip, TEST BLOOD SUGAR EVERY DAY, Disp: 100 strip, Rfl: 3    lidocaine 5 % External Patch, Place 1 patch onto the skin daily., Disp: 90 patch, Rfl: 0    Alcohol Swabs (DROPSAFE ALCOHOL PREP) 70 % Does not apply Pads, Apply 1 Application topically As  Directed., Disp: 400 each, Rfl: 3    Acetaminophen-Codeine (TYLENOL WITH CODEINE #3) 300-30 MG Oral Tab, Take 1 tablet by mouth every 12 (twelve) hours as needed for Pain., Disp: 30 tablet, Rfl: 0    naproxen 500 MG Oral Tab, Take 1 tablet (500 mg total) by mouth 2 (two) times daily as needed., Disp: 15 tablet, Rfl: 0    amLODIPine 2.5 MG Oral Tab, Take 1 tablet (2.5 mg total) by mouth daily., Disp: 30 tablet, Rfl: 2    Diclofenac Sodium 1 % External Gel, Apply 2 g topically 4 (four) times daily., Disp: 1 each, Rfl: 0    SHINGRIX 50 MCG/0.5ML Intramuscular Recon Susp, , Disp: , Rfl:     Pyridoxine HCl (VITAMIN B-6 OR), Take by mouth., Disp: , Rfl:     CALCIUM OR, Take by mouth., Disp: , Rfl:     Cholecalciferol (VITAMIN D-3 OR), Take by mouth., Disp: , Rfl:     aspirin 81 MG Oral Tab EC, TAKE 1 TABLET BY MOUTH EVERY DAY, Disp: 30 tablet, Rfl: 2    melatonin 3 MG Oral Tab, Take one tab at night 30 min before going to bed, Disp: 30 tablet, Rfl: 0    Blood Glucose Monitoring Suppl (TRUE METRIX AIR GLUCOSE METER) w/Device Does not apply Kit, USE AS DIRECTED, Disp: 1 kit, Rfl: 0    There are no discontinued medications.    Patient Active Problem List   Diagnosis    Cerebral artery occlusion with cerebral infarction (HCC)    Essential hypertension    Hyperlipidemia    Speech disturbance    Abnormality of gait as late effect of cerebrovascular accident (CVA)    Age-related nuclear cataract of both eyes    Type 2 diabetes mellitus with diabetic neuropathy, without long-term current use of insulin ([facility])    Carotid artery plaque, bilateral    Chronic diastolic congestive heart failure, NYHA class 1 ([facility])    Diabetes mellitus type 2 without retinopathy ([facility])    Floater, vitreous, bilateral    Primary osteoarthritis of right knee    Hemarthrosis of right knee    Postmenopausal    Spastic hemiplegia of right dominant side as late effect of cerebral infarction ([facility])    Primary osteoarthritis of both knees    Mild anemia        Past Medical History:    Ametropia    Hyperopia    Diabetes (HCC)    Lipid screening    Numbness and tingling of leg    Other and unspecified hyperlipidemia    Stroke ([facility])    Type II or unspecified type diabetes mellitus without mention of complication, not stated as uncontrolled    Unspecified essential hypertension       Past Surgical History:   Procedure Laterality Date    Colonoscopy      5 years ago date unknown    Cyst aspiration right Right     Hysterectomy      Brendon localization wire 1 site right (cpt=19281)      pt states 20 yrs+ she had a benign cyst removed from her rt breast.     Oophorectomy         Family History   Problem Relation Age of Onset    Heart Disorder Mother         congestive heart failure    Diabetes Other         Daughter has DM    Breast Cancer Other         maternal cousin    Macular degeneration Neg     Glaucoma Neg        Social History     Occupational History    Not on file   Tobacco Use    Smoking status: Never    Smokeless tobacco: Never   Vaping Use    Vaping status: Never Used   Substance and Sexual Activity    Alcohol use: No    Drug use: No    Sexual activity: Not on file       ROS      Constitutional: negative for chills, fevers and sweats  Gastrointestinal: negative for abdominal pain, diarrhea, nausea and vomiting  Genitourinary:negative for dysuria and hematuria  Musculoskeletal:negative for arthralgias and muscle weakness  Neurological: negative for paresthesia and weakness  All others reviewed and negative.      Physical Exam     LE PHYSICAL EXAM    Constitution: Well-developed and well-nourished. Gait appears normal. No apparent distress. Alert and oriented to person, place, and time.  Integument: There are no varicosities. Skin appears moist, warm, and supple with positive hair growth. There are no color changes. No open lesions. No macerations, No Hyperkeratotic lesions.  Vascular examination: Dorsalis pedis and posterior tibial pulses are strong bilaterally  with capillary filling time less than 3 seconds to all digits. There is no peripheral edema..  Neurological Sensorium: Grossly intact to sharp/dull. Vibratory: Intact.  Musculoskeletal:   5/5 pedal muscle strength b/l       Advanced trophic changes as: hair growth nail changes  (thickening) pigmentary changes (discoloration) skin texture (thin, shiny)   Procedure       Nails 1 through 5 bilateral debrided with use of a nail nipper.  Patient Toller procedure well had no complications.  Neurovascular status intact to the level of the digits post procedure.     Assessment and Plan     Encounter Diagnoses   Name Primary?    Type 2 diabetes mellitus without complication, with long-term current use of insulin (HCC) Yes    Pain due to onychomycosis of toenails of both feet    Diabetic education and instructions have been provided. We reviewed and discussed the following:    -risk categories related to pts with diabetes and foot or lower extremity complications per ADA.    -adherence to medication regimen and close monitoring or blood sugar control.   -daily monitoring/inspection of feet and shoes.   -proper management of diet and weight   -regular follow up with PCP and specialty providers as recommended   -Lower extremity complications related to DM were reviewed and stressed prevention.         Evaluated the patient. Discussed treatment options with the patient.  Discussed with patient proper care and hygiene for their feet.  Patient tolerated procedures well, without incident.    Instrumentation used includes nail nippers and electric  where appropriate.  Procedure: (00997 Debridement of toenails 6-10) Surgically debrided and mechanically reduced 6 or more toenails.Hemmorhage occurred none.Nails that were debrided appeared dystrophic and caused the patient pain in shoe gear.Nails 5 Left & 5 Right.     Evaluated patient. Discussed treatment options with patient.  Discussed proper hygiene and care for feet as well  as use of emollient creams (ie Urea based creams)  Answered all patient questions. Discussed offloading hyperkeratotic lesions with proper shoe gear, offloading pads, and insoles.     Patient was instructed to call the office or on-call podiatric physician immediately with any issues or concerns before the next scheduled visit. Patient to follow-up in clinic in 3 months      Lacy Ortiz DPM, [email or web address] VENTURA  Diplomat, American Board of Foot and Ankle Surgery  Certified in Foot and Rearfoot/Ankle Reconstruction  Fellow of the American College of Foot and Ankle Surgeons  Fellowship Trained Foot and Ankle Surgeon   [facility]     11/22/2024    2:10 PM       [1] No Known Allergies

## 2024-11-25 ENCOUNTER — TELEPHONE (OUTPATIENT)
Dept: INTERNAL MEDICINE CLINIC | Facility: CLINIC | Age: 74
End: 2024-11-25

## 2024-11-25 RX ORDER — GABAPENTIN 100 MG/1
200 CAPSULE ORAL NIGHTLY
Qty: 180 CAPSULE | Refills: 1 | OUTPATIENT
Start: 2024-11-25

## 2024-11-25 NOTE — TELEPHONE ENCOUNTER
Patient is requesting a medication refill and mentions she is running low on dosage and will be going out of town    Gabapentin    [facility] in [facility] confirmed preferred

## 2024-12-12 NOTE — TELEPHONE ENCOUNTER
Refilled passed Diabetic supply protocol     Last Office Visit: 10/26/2024    Messaged patient- to verify how many times per day patient is using a prep pad.     Requested Prescriptions   Pending Prescriptions Disp Refills    DROPSAFE ALCOHOL PREP 70 % Does not apply Pads [Pharmacy Med Name: DropSafe Alcohol Prep Pad 70 %]  3     Sig: Apply 1 Application topically As Directed.       There is no refill protocol information for this order          Recent Outpatient Visits              2 weeks ago Type 2 diabetes mellitus without complication, with long-term current use of insulin ([facility])    [facility], Lacy Corado DPM    Office Visit    1 month ago Type 2 diabetes mellitus with diabetic neuropathy, without long-term current use of insulin ([facility]    [facility] Visit    1 month ago Primary osteoarthritis of right knee    [facility], Kevin Suárez MD    Office Visit    4 months ago Type 2 diabetes mellitus with diabetic polyneuropathy, without long-term current use of insulin ([facility]    Office Visit    5 months ago Type 2 diabetes mellitus with diabetic neuropathy, without long-term current use of insulin ([facility])    [facility]    [facility] Visit

## 2024-12-16 RX ORDER — ISOPROPYL ALCOHOL 70 ML/100ML
1 SWAB TOPICAL DAILY
Qty: 100 EACH | Refills: 3 | Status: SHIPPED | OUTPATIENT
Start: 2024-12-16

## 2024-12-16 NOTE — TELEPHONE ENCOUNTER
Dr. Mathias, please kindly review. This medication has no protocol attached.    Prescription directions changed from as directed to once daily.    Per 7/10/24 test strips prescription - patient tests blood sugar daily.    No future appointments.  Last office visit: 10/26/2024    Requested Prescriptions   Pending Prescriptions Disp Refills    Alcohol Swabs (DROPSAFE ALCOHOL PREP) 70 % Does not apply Pads [Pharmacy Med Name: DropSafe Alcohol Prep Pad 70 %] 100 each 3     Sig: Apply 1 Pad topically daily.       There is no refill protocol information for this order            Recent Outpatient Visits              3 weeks ago Type 2 diabetes mellitus without complication, with long-term current use of insulin ([facility])    [facility] [place]    Office Visit    1 month ago Type 2 diabetes mellitus with diabetic neuropathy, without long-term current use of insulin ([facility]    [facility]    1 month ago Primary osteoarthritis of right knee    [facility]    Office Visit    5 months ago Type 2 diabetes mellitus with diabetic polyneuropathy, without long-term current use of insulin ([facility])    [facility] Shawn Macias MD    Office Visit    5 months ago Type 2 diabetes mellitus with diabetic neuropathy, without long-term current use of insulin ([facility])    [facility] [place]    Office Visit

## 2024-12-16 NOTE — TELEPHONE ENCOUNTER
Per most recent prescription for test strips 7/10/2024: Patient is testing one time daily.    Medication Quantity Refills Start End   Glucose Blood (TRUE METRIX BLOOD GLUCOSE TEST) In Vitro Strip 100 strip 3 7/10/2024 --   Sig:   TEST BLOOD SUGAR EVERY DAY     Route:   (none)     Note to Pharmacy:   Dx Type 2 diabetes mellitus with diabetic neuropathy, without long-term current use of insulin (HCC) E11.40     Order #:   [id number]       [facility] - [place] [id number] [facility] [phone], [phone]     Requested Prescriptions     Pending Prescriptions Disp Refills    Alcohol Swabs (DROPSAFE ALCOHOL PREP) 70 % Does not apply Pads [Pharmacy Med Name: DropSafe Alcohol Prep Pad 70 %] 100 each 3     Sig: Apply 1 Pad topically daily.

## 2025-01-18 ENCOUNTER — OFFICE VISIT (OUTPATIENT)
Dept: INTERNAL MEDICINE CLINIC | Facility: CLINIC | Age: 75
End: 2025-01-18
Payer: MEDICARE

## 2025-01-18 VITALS
TEMPERATURE: 98 F | HEIGHT: 63 IN | WEIGHT: 154.19 LBS | OXYGEN SATURATION: 100 % | HEART RATE: 71 BPM | SYSTOLIC BLOOD PRESSURE: 153 MMHG | DIASTOLIC BLOOD PRESSURE: 88 MMHG | BODY MASS INDEX: 27.32 KG/M2

## 2025-01-18 DIAGNOSIS — M79.642 LEFT HAND PAIN: Primary | ICD-10-CM

## 2025-01-18 DIAGNOSIS — M25.561 CHRONIC PAIN OF RIGHT KNEE: ICD-10-CM

## 2025-01-18 DIAGNOSIS — E78.5 HYPERLIPIDEMIA, UNSPECIFIED HYPERLIPIDEMIA TYPE: ICD-10-CM

## 2025-01-18 DIAGNOSIS — I10 PRIMARY HYPERTENSION: ICD-10-CM

## 2025-01-18 DIAGNOSIS — E11.40 TYPE 2 DIABETES MELLITUS WITH DIABETIC NEUROPATHY, WITHOUT LONG-TERM CURRENT USE OF INSULIN (HCC): ICD-10-CM

## 2025-01-18 DIAGNOSIS — G89.29 CHRONIC PAIN OF RIGHT KNEE: ICD-10-CM

## 2025-01-18 DIAGNOSIS — R20.0 NUMBNESS OF LEFT HAND: ICD-10-CM

## 2025-01-18 LAB
EST. AVERAGE GLUCOSE BLD GHB EST-MCNC: 160 MG/DL (ref 68–126)
HBA1C MFR BLD: 7.2 % (ref ?–5.7)

## 2025-01-18 PROCEDURE — 1125F AMNT PAIN NOTED PAIN PRSNT: CPT | Performed by: INTERNAL MEDICINE

## 2025-01-18 PROCEDURE — 1160F RVW MEDS BY RX/DR IN RCRD: CPT | Performed by: INTERNAL MEDICINE

## 2025-01-18 PROCEDURE — 3077F SYST BP >= 140 MM HG: CPT | Performed by: INTERNAL MEDICINE

## 2025-01-18 PROCEDURE — 3008F BODY MASS INDEX DOCD: CPT | Performed by: INTERNAL MEDICINE

## 2025-01-18 PROCEDURE — 3079F DIAST BP 80-89 MM HG: CPT | Performed by: INTERNAL MEDICINE

## 2025-01-18 PROCEDURE — 99214 OFFICE O/P EST MOD 30 MIN: CPT | Performed by: INTERNAL MEDICINE

## 2025-01-18 PROCEDURE — 3051F HG A1C>EQUAL 7.0%<8.0%: CPT | Performed by: INTERNAL MEDICINE

## 2025-01-18 PROCEDURE — 1159F MED LIST DOCD IN RCRD: CPT | Performed by: INTERNAL MEDICINE

## 2025-01-18 RX ORDER — ACETAMINOPHEN AND CODEINE PHOSPHATE 300; 30 MG/1; MG/1
1 TABLET ORAL NIGHTLY PRN
Qty: 30 TABLET | Refills: 0 | Status: SHIPPED | OUTPATIENT
Start: 2025-01-18

## 2025-01-18 RX ORDER — AMLODIPINE BESYLATE 5 MG/1
5 TABLET ORAL DAILY
Qty: 90 TABLET | Refills: 1 | Status: SHIPPED | OUTPATIENT
Start: 2025-01-18

## 2025-01-20 ENCOUNTER — TELEPHONE (OUTPATIENT)
Dept: INTERNAL MEDICINE CLINIC | Facility: CLINIC | Age: 75
End: 2025-01-20

## 2025-01-20 DIAGNOSIS — R20.0 HAND NUMBNESS: Primary | ICD-10-CM

## 2025-01-20 NOTE — TELEPHONE ENCOUNTER
Patient called and is asking if her referral for Alex Behar can be changed to Kevin Ortez MD, she will be seeing him instead tomorrow.

## 2025-01-21 ENCOUNTER — OFFICE VISIT (OUTPATIENT)
Dept: PHYSICAL MEDICINE AND REHAB | Facility: CLINIC | Age: 75
End: 2025-01-21
Payer: MEDICARE

## 2025-01-21 ENCOUNTER — TELEPHONE (OUTPATIENT)
Dept: PHYSICAL MEDICINE AND REHAB | Facility: CLINIC | Age: 75
End: 2025-01-21

## 2025-01-21 VITALS — HEIGHT: 63 IN | BODY MASS INDEX: 27.29 KG/M2 | WEIGHT: 154 LBS

## 2025-01-21 DIAGNOSIS — I69.351 SPASTIC HEMIPLEGIA OF RIGHT DOMINANT SIDE AS LATE EFFECT OF CEREBRAL INFARCTION (HCC): ICD-10-CM

## 2025-01-21 DIAGNOSIS — G56.02 CARPAL TUNNEL SYNDROME OF LEFT WRIST: Primary | ICD-10-CM

## 2025-01-21 DIAGNOSIS — E11.40 TYPE 2 DIABETES MELLITUS WITH DIABETIC NEUROPATHY, WITHOUT LONG-TERM CURRENT USE OF INSULIN (HCC): ICD-10-CM

## 2025-01-21 NOTE — TELEPHONE ENCOUNTER
Initiated authorization for Left carpal tunnel injections. CPT/HCPCS 15986, , 84111 dx:G56.02 with [employer] [facility].  Completed in the office today.    Status: Approved - no auth required    Authorization is not required based on medical necessity, however, is not a guarantee of payment and may be subject to review once claim is submitted-Covered Benefit.

## 2025-01-21 NOTE — PROCEDURES
Carpal tunnel injection  After discussing the benefits and possible risks of this procedure including bleeding, infection and nerve injury among others and also discussing alternatives including oral medication or foregoing the injection, we proceeded with a left carpal tunnel injection.  Using aseptic technique a total of 2 mL of 1% lidocaine and 1 mL of 40 mg/mL Kenalog was injected into the ulnar bursa / carpal tunnel using an approach at the proximal wrist crease with a 25-gauge needle.  The patient tolerated the procedure without adverse effects.

## 2025-01-21 NOTE — TELEPHONE ENCOUNTER
Dr. Mathias,     Patient called requesting referral to Dr. Ortez for appointment today 1/21/25.     Pended referral please review diagnosis and sign off if you agree.    Thank you.  Alcira Maxwell  [facility]

## 2025-01-21 NOTE — PROGRESS NOTES
[facility]  Progress Note    CHIEF COMPLAINT:    Chief Complaint   Patient presents with    Hand Pain     LOV: 10/18/24 Patient comes in for left hand pain that radiates into forearm with N/T.  Pain started a few months ago, no injury. Wears brace.  Takes Tylenol. Rates pain 8/10.       History of Present Illness:  Radha August is a 74 year old female who presents today for follow up for new symptoms of left forearm and hand pain with numbness and tingling.  Symptoms were insidious onset and have been present for a few months.  She is diabetic and has a history of right spastic hemiplegia due to stroke.  I previously treated her for right knee pain which is arthritic and secondary to chronic hyperextension during gait due to spasticity.    PAST MEDICAL HISTORY:  Past Medical History:    Ametropia    Hyperopia    Diabetes (HCC)    Lipid screening    Numbness and tingling of leg    Other and unspecified hyperlipidemia    Stroke (HCC)    Type II or unspecified type diabetes mellitus without mention of complication, not stated as uncontrolled    Unspecified essential hypertension       SURGICAL HISTORY:  Past Surgical History:   Procedure Laterality Date    Colonoscopy      5 years ago date unknown    Cyst aspiration right Right     Hysterectomy      Brendon localization wire 1 site right (cpt=19281)      pt states 20 yrs+ she had a benign cyst removed from her rt breast.     Oophorectomy         SOCIAL HISTORY:   Social History     Occupational History    Not on file   Tobacco Use    Smoking status: Never    Smokeless tobacco: Never   Vaping Use    Vaping status: Never Used   Substance and Sexual Activity    Alcohol use: No    Drug use: No    Sexual activity: Not on file       CURRENT MEDICATIONS:   Current Outpatient Medications   Medication Sig Dispense Refill    amLODIPine 5 MG Oral Tab Take 1 tablet (5 mg total) by mouth daily. 90 tablet 1    acetaminophen-codeine  300-30 MG Oral Tab Take 1 tablet by mouth nightly as needed for Pain. 30 tablet 0    Alcohol Swabs (DROPSAFE ALCOHOL PREP) 70 % Does not apply Pads Apply 1 Pad topically daily. Prior to each finger stick 100 each 3    gabapentin 100 MG Oral Cap Take 2 capsules (200 mg total) by mouth at bedtime. 180 capsule 1    metFORMIN 500 MG Oral Tab Take 1 tablet (500 mg total) by mouth 2 (two) times daily with meals. 60 tablet 2    glipiZIDE ER 2.5 MG Oral Tablet 24 Hr Take 1 tablet (2.5 mg total) by mouth daily with breakfast. 90 tablet 1    atorvastatin 20 MG Oral Tab Take 1 tablet (20 mg total) by mouth daily. 90 tablet 3    clopidogrel 75 MG Oral Tab Take 1 tablet (75 mg total) by mouth daily. 90 tablet 3    lisinopril-hydroCHLOROthiazide 20-25 MG Oral Tab Take 1 tablet by mouth daily. 90 tablet 3    ketoconazole 2 % External Shampoo Apply 1 Application topically twice a week. 100 mL 0    metoprolol succinate ER 25 MG Oral Tablet 24 Hr Take 1 tablet (25 mg total) by mouth daily. 90 tablet 3    Glucose Blood (TRUE METRIX BLOOD GLUCOSE TEST) In Vitro Strip TEST BLOOD SUGAR EVERY  strip 3    lidocaine 5 % External Patch Place 1 patch onto the skin daily. 90 patch 0    Acetaminophen-Codeine (TYLENOL WITH CODEINE #3) 300-30 MG Oral Tab Take 1 tablet by mouth every 12 (twelve) hours as needed for Pain. 30 tablet 0    naproxen 500 MG Oral Tab Take 1 tablet (500 mg total) by mouth 2 (two) times daily as needed. 15 tablet 0    Diclofenac Sodium 1 % External Gel Apply 2 g topically 4 (four) times daily. 1 each 0    SHINGRIX 50 MCG/0.5ML Intramuscular Recon Susp       Pyridoxine HCl (VITAMIN B-6 OR) Take by mouth.      CALCIUM OR Take by mouth.      Cholecalciferol (VITAMIN D-3 OR) Take by mouth.      aspirin 81 MG Oral Tab EC TAKE 1 TABLET BY MOUTH EVERY DAY 30 tablet 2    melatonin 3 MG Oral Tab Take one tab at night 30 min before going to bed 30 tablet 0       ALLERGIES:   Allergies[1]        PHYSICAL EXAM:   Ht 63\"   Wt  154 lb (69.9 kg)   BMI 27.28 kg/m²     Body mass index is 27.28 kg/m².      General: No immediate distress  Extremities: No lower extremity edema bilaterally   Spine: full and painfree lumbar ROM in all directions  Wrists: full and painfree ROM   Neuro:   Cognition: alert & oriented x 3, attentive, able to follow 2 step commands, comprehention intact, spontaneous speech intact  Strength: Upper extremities have 5/5 strength, no atrophy  Sensation: Decreased over the left middle finger  Tinel's sign: Negative over the wrist          ASSESSMENT AND PLAN:  1. Carpal tunnel syndrome of left wrist  She states had left carpal tunnel syndrome.  Symptoms involve the entire hand, worse at night, she has been given a splint which helps slightly.  We discussed options and decided to do a carpal tunnel injection.  If no better consider EMG.  - SPECIALTY (OTHER) - INTERNAL    2. Spastic hemiplegia of right dominant side as late effect of cerebral infarction (HCC)  Stable    3. Type 2 diabetes mellitus with diabetic neuropathy, without long-term current use of insulin (HCC)  Caution with steroids        RTC as needed      The patient was in agreement with the assessment and plan.  All questions were answered.        Kevin Ortez MD  Physical Medicine and Rehabilitation/Sports Medicine  [facility]           [1] No Known Allergies

## 2025-01-23 NOTE — PROGRESS NOTES
Subjective:     Patient ID: Radha August is a 74 year old female.    Patient comes in today for follow-up complaints of left hand pain and numbness this been going on for few weeks now patient has history of stroke but with right-sided weakness so this is a new kind of problem  Patient also with continued knee pain has seen specialist getting shots        History/Other:   Review of Systems   Constitutional: Negative.    HENT: Negative.     Eyes: Negative.    Respiratory: Negative.     Cardiovascular: Negative.    Gastrointestinal: Negative.    Genitourinary: Negative.    Musculoskeletal:  Positive for arthralgias.   Skin: Negative.    Neurological:  Positive for numbness.   Psychiatric/Behavioral: Negative.     All other systems reviewed and are negative.    Current Outpatient Medications   Medication Sig Dispense Refill    amLODIPine 5 MG Oral Tab Take 1 tablet (5 mg total) by mouth daily. 90 tablet 1    acetaminophen-codeine 300-30 MG Oral Tab Take 1 tablet by mouth nightly as needed for Pain. 30 tablet 0    Alcohol Swabs (DROPSAFE ALCOHOL PREP) 70 % Does not apply Pads Apply 1 Pad topically daily. Prior to each finger stick 100 each 3    gabapentin 100 MG Oral Cap Take 2 capsules (200 mg total) by mouth at bedtime. 180 capsule 1    metFORMIN 500 MG Oral Tab Take 1 tablet (500 mg total) by mouth 2 (two) times daily with meals. 60 tablet 2    glipiZIDE ER 2.5 MG Oral Tablet 24 Hr Take 1 tablet (2.5 mg total) by mouth daily with breakfast. 90 tablet 1    atorvastatin 20 MG Oral Tab Take 1 tablet (20 mg total) by mouth daily. 90 tablet 3    clopidogrel 75 MG Oral Tab Take 1 tablet (75 mg total) by mouth daily. 90 tablet 3    lisinopril-hydroCHLOROthiazide 20-25 MG Oral Tab Take 1 tablet by mouth daily. 90 tablet 3    ketoconazole 2 % External Shampoo Apply 1 Application topically twice a week. 100 mL 0    metoprolol succinate ER 25 MG Oral Tablet 24 Hr Take 1 tablet (25 mg total) by mouth daily. 90 tablet 3     Glucose Blood (TRUE METRIX BLOOD GLUCOSE TEST) In Vitro Strip TEST BLOOD SUGAR EVERY  strip 3    lidocaine 5 % External Patch Place 1 patch onto the skin daily. 90 patch 0    Acetaminophen-Codeine (TYLENOL WITH CODEINE #3) 300-30 MG Oral Tab Take 1 tablet by mouth every 12 (twelve) hours as needed for Pain. 30 tablet 0    naproxen 500 MG Oral Tab Take 1 tablet (500 mg total) by mouth 2 (two) times daily as needed. 15 tablet 0    Diclofenac Sodium 1 % External Gel Apply 2 g topically 4 (four) times daily. 1 each 0    SHINGRIX 50 MCG/0.5ML Intramuscular Recon Susp       Pyridoxine HCl (VITAMIN B-6 OR) Take by mouth.      CALCIUM OR Take by mouth.      Cholecalciferol (VITAMIN D-3 OR) Take by mouth.      aspirin 81 MG Oral Tab EC TAKE 1 TABLET BY MOUTH EVERY DAY 30 tablet 2    melatonin 3 MG Oral Tab Take one tab at night 30 min before going to bed 30 tablet 0     Allergies:Allergies[1]    Past Medical History:    Ametropia    Hyperopia    Diabetes (HCC)    Lipid screening    Numbness and tingling of leg    Other and unspecified hyperlipidemia    Stroke ([facility])    Type II or unspecified type diabetes mellitus without mention of complication, not stated as uncontrolled    Unspecified essential hypertension      Past Surgical History:   Procedure Laterality Date    Colonoscopy      5 years ago date unknown    Cyst aspiration right Right     Hysterectomy      Brendon localization wire 1 site right (cpt=19281)      pt states 20 yrs+ she had a benign cyst removed from her rt breast.     Oophorectomy        Family History   Problem Relation Age of Onset    Heart Disorder Mother         congestive heart failure    Diabetes Other         Daughter has DM    Breast Cancer Other         maternal cousin    Macular degeneration Neg     Glaucoma Neg       Social History:   Social History     Socioeconomic History    Marital status: Single   Tobacco Use    Smoking status: Never    Smokeless tobacco: Never   Vaping Use    Vaping  status: Never Used   Substance and Sexual Activity    Alcohol use: No    Drug use: No   Other Topics Concern    Caffeine Concern No     Social Drivers of Health      Received from [facility], [facility]    [facility]        Objective:   Physical Exam  Vitals and nursing note reviewed.   Constitutional:       Appearance: She is well-developed.   HENT:      Head: Normocephalic and atraumatic.      Right Ear: External ear normal.      Left Ear: External ear normal.      Nose: Nose normal.   Eyes:      Conjunctiva/sclera: Conjunctivae normal.      Pupils: Pupils are equal, round, and reactive to light.   Cardiovascular:      Rate and Rhythm: Normal rate and regular rhythm.      Heart sounds: Normal heart sounds.   Pulmonary:      Effort: Pulmonary effort is normal.      Breath sounds: Normal breath sounds.   Abdominal:      General: Bowel sounds are normal.      Palpations: Abdomen is soft.   Genitourinary:     Vagina: Normal.   Musculoskeletal:         General: Tenderness present. Normal range of motion.      Cervical back: Normal range of motion and neck supple.      Comments:   Left hand numbness and pain also right knee pain   Skin:     General: Skin is warm and dry.   Neurological:      Mental Status: She is alert and oriented to person, place, and time.      Deep Tendon Reflexes: Reflexes are normal and symmetric.   Psychiatric:         Behavior: Behavior normal.         Thought Content: Thought content normal.         Judgment: Judgment normal.         Assessment & Plan:   1. Left hand pain ?  Possible carpal tunnel wear a splint will order an EMG will refer to physiatry also will give medication for pain   2. Numbness of left hand as above   3. Type 2 diabetes mellitus with diabetic neuropathy, without long-term current use of insulin (HCC) - will retest    4. Hyperlipidemia, unspecified hyperlipidemia type - will retest    5. Primary hypertension - will add new med watch diet follow back in few weeks for  retest    6. Chronic pain of right knee - as need med for pain       Orders Placed This Encounter   Procedures    Hemoglobin A1C       Meds This Visit:  Requested Prescriptions     Signed Prescriptions Disp Refills    amLODIPine 5 MG Oral Tab 90 tablet 1     Sig: Take 1 tablet (5 mg total) by mouth daily.    acetaminophen-codeine 300-30 MG Oral Tab 30 tablet 0     Sig: Take 1 tablet by mouth nightly as needed for Pain.       Imaging & Referrals:  PHYSIATRY - INTERNAL  OPHTHALMOLOGY - INTERNAL            [1] No Known Allergies

## 2025-01-29 ENCOUNTER — TELEPHONE (OUTPATIENT)
Dept: INTERNAL MEDICINE CLINIC | Facility: CLINIC | Age: 75
End: 2025-01-29

## 2025-01-29 NOTE — TELEPHONE ENCOUNTER
Patient called wanting to know why she has not received her amlodipine. Patient was inform that our records indicate it was sent to her mail pharmacy on 1/18/25. She will have to call her mail pharmacy for more information. Patient verbalized understanding.      amLODIPine 5 MG Oral Tab 90 tablet 1 1/18/2025 --    Sig - Route: Take 1 tablet (5 mg total) by mouth daily. - Oral    Sent to pharmacy as: amLODIPine Besylate 5 MG Oral Tablet (Norvasc)    E-Prescribing Status: Receipt confirmed by pharmacy (1/18/2025 11:51 AM CST)      Pharmacy    [facility] - [facility] [address] [phone], [phone]

## 2025-01-29 NOTE — TELEPHONE ENCOUNTER
REFILL PASSED PER [facility] PROTOCOLS    Requested Prescriptions   Pending Prescriptions Disp Refills    metFORMIN 500 MG Oral Tab 180 tablet 3     Sig: Take 1 tablet (500 mg total) by mouth 2 (two) times daily with meals.       Diabetes Medication Protocol Passed - 1/29/2025  3:06 PM        Passed - Last A1C < 7.5 and within past 6 months     Lab Results   Component Value Date    A1C 7.2 (H) 01/18/2025             Passed - In person appointment or virtual visit in the past 6 mos or appointment in next 3 mos     Recent Outpatient Visits              [facility]    [facility]    [facility]    Office Visit    2 months ago Type 2 diabetes mellitus without complication, with long-term current use of insulin ([facility]    Office Visit    3 months ago Type 2 diabetes mellitus with diabetic neuropathy, without long-term current use of insulin ([facility])    [facility] Shawn Macias MD    Office Visit    3 months ago Primary osteoarthritis of right knee    [facility]    [facility]          Future Appointments         Provider Department Appt Notes    In [facility]     In 2 months Shawn Mathias MD [facility]                     Passed - Microalbumin procedure in past 12 months or taking ACE/ARB        Passed - EGFRCR or GFRAA > 50     GFR Evaluation  EGFRCR: 61 , resulted on 10/26/2024          Passed - GFR in the past 12 months        Passed - Medication is active on med list             Future Appointments         Provider  Department Appt Notes    In [facility] month [facility]     In 2 months Shawn Mathias MD [facility]           Recent Outpatient Visits              1 week ago [facility]pal tunnel syndrome of left wrist    [facility]    Office Visit    1 week ago Left hand pain    [facility]    Office Visit    2 months ago Type 2 diabetes mellitus without complication, with long-term current use of insulin ([facility])    [facility] Lacy Ag DPM    Office Visit    3 months ago Type 2 diabetes mellitus with diabetic neuropathy, without long-term current use of insulin ([facility])    [facility]    Office Visit    3 months ago Primary osteoarthritis of right knee    [facility] Kevin Ortez MD    Office Visit

## 2025-02-20 ENCOUNTER — TELEPHONE (OUTPATIENT)
Dept: INTERNAL MEDICINE CLINIC | Facility: CLINIC | Age: 75
End: 2025-02-20

## 2025-02-27 ENCOUNTER — MED REC SCAN ONLY (OUTPATIENT)
Dept: INTERNAL MEDICINE CLINIC | Facility: CLINIC | Age: 75
End: 2025-02-27

## 2025-03-01 ENCOUNTER — OFFICE VISIT (OUTPATIENT)
Dept: INTERNAL MEDICINE CLINIC | Facility: CLINIC | Age: 75
End: 2025-03-01

## 2025-03-01 VITALS
HEART RATE: 73 BPM | DIASTOLIC BLOOD PRESSURE: 88 MMHG | OXYGEN SATURATION: 99 % | TEMPERATURE: 97 F | RESPIRATION RATE: 18 BRPM | WEIGHT: 153.38 LBS | BODY MASS INDEX: 27.18 KG/M2 | SYSTOLIC BLOOD PRESSURE: 155 MMHG | HEIGHT: 63 IN

## 2025-03-01 DIAGNOSIS — R19.4 CHANGE IN STOOL HABITS: ICD-10-CM

## 2025-03-01 DIAGNOSIS — I10 PRIMARY HYPERTENSION: Primary | ICD-10-CM

## 2025-03-01 PROCEDURE — 1159F MED LIST DOCD IN RCRD: CPT | Performed by: INTERNAL MEDICINE

## 2025-03-01 PROCEDURE — 1126F AMNT PAIN NOTED NONE PRSNT: CPT | Performed by: INTERNAL MEDICINE

## 2025-03-01 PROCEDURE — 3077F SYST BP >= 140 MM HG: CPT | Performed by: INTERNAL MEDICINE

## 2025-03-01 PROCEDURE — 99214 OFFICE O/P EST MOD 30 MIN: CPT | Performed by: INTERNAL MEDICINE

## 2025-03-01 PROCEDURE — 3008F BODY MASS INDEX DOCD: CPT | Performed by: INTERNAL MEDICINE

## 2025-03-01 PROCEDURE — 3079F DIAST BP 80-89 MM HG: CPT | Performed by: INTERNAL MEDICINE

## 2025-03-01 NOTE — PROGRESS NOTES
Subjective:     Patient ID: Radha August is a 74 year old female.    Patient comes in today for follow-up on her blood pressure patient misunderstood thinking that once she starts the amlodipine she needs to stop the lisinopril hydrochlorothiazide so she has not been taking blood pressure has been elevated.  Patient also today complains of some GI issues with increased in loose stools sometimes with urge incontinent a lot of gas is ongoing for few months now        History/Other:   Review of Systems   Constitutional: Negative.    HENT: Negative.     Eyes: Negative.    Respiratory: Negative.     Cardiovascular: Negative.    Gastrointestinal: Negative.         Change in stool habits, increase gas    Genitourinary: Negative.    Musculoskeletal: Negative.    Skin: Negative.    Neurological: Negative.    Psychiatric/Behavioral: Negative.       Current Outpatient Medications   Medication Sig Dispense Refill    metFORMIN 500 MG Oral Tab Take 1 tablet (500 mg total) by mouth 2 (two) times daily with meals. 180 tablet 3    amLODIPine 5 MG Oral Tab Take 1 tablet (5 mg total) by mouth daily. 90 tablet 1    Alcohol Swabs (DROPSAFE ALCOHOL PREP) 70 % Does not apply Pads Apply 1 Pad topically daily. Prior to each finger stick 100 each 3    gabapentin 100 MG Oral Cap Take 2 capsules (200 mg total) by mouth at bedtime. 180 capsule 1    glipiZIDE ER 2.5 MG Oral Tablet 24 Hr Take 1 tablet (2.5 mg total) by mouth daily with breakfast. 90 tablet 1    atorvastatin 20 MG Oral Tab Take 1 tablet (20 mg total) by mouth daily. 90 tablet 3    clopidogrel 75 MG Oral Tab Take 1 tablet (75 mg total) by mouth daily. 90 tablet 3    lisinopril-hydroCHLOROthiazide 20-25 MG Oral Tab Take 1 tablet by mouth daily. 90 tablet 3    ketoconazole 2 % External Shampoo Apply 1 Application topically twice a week. 100 mL 0    metoprolol succinate ER 25 MG Oral Tablet 24 Hr Take 1 tablet (25 mg total) by mouth daily. 90 tablet 3    Glucose Blood (TRUE  METRIX BLOOD GLUCOSE TEST) In Vitro Strip TEST BLOOD SUGAR EVERY  strip 3    lidocaine 5 % External Patch Place 1 patch onto the skin daily. 90 patch 0    Acetaminophen-Codeine (TYLENOL WITH CODEINE #3) 300-30 MG Oral Tab Take 1 tablet by mouth every 12 (twelve) hours as needed for Pain. 30 tablet 0    naproxen 500 MG Oral Tab Take 1 tablet (500 mg total) by mouth 2 (two) times daily as needed. 15 tablet 0    Diclofenac Sodium 1 % External Gel Apply 2 g topically 4 (four) times daily. 1 each 0    Pyridoxine HCl (VITAMIN B-6 OR) Take by mouth.      CALCIUM OR Take by mouth.      Cholecalciferol (VITAMIN D-3 OR) Take by mouth.      aspirin 81 MG Oral Tab EC TAKE 1 TABLET BY MOUTH EVERY DAY 30 tablet 2    melatonin 3 MG Oral Tab Take one tab at night 30 min before going to bed 30 tablet 0     Allergies:Allergies[1]    Past Medical History:    Ametropia    Hyperopia    Diabetes (HCC)    Lipid screening    Numbness and tingling of leg    Other and unspecified hyperlipidemia    Stroke ([facility])    Type II or unspecified type diabetes mellitus without mention of complication, not stated as uncontrolled    Unspecified essential hypertension      Past Surgical History:   Procedure Laterality Date    Colonoscopy      5 years ago date unknown    Cyst aspiration right Right     Hysterectomy      Brendon localization wire 1 site right (cpt=19281)      pt states 20 yrs+ she had a benign cyst removed from her rt breast.     Oophorectomy        Family History   Problem Relation Age of Onset    Heart Disorder Mother         congestive heart failure    Diabetes Other         Daughter has DM    Breast Cancer Other         maternal cousin    Macular degeneration Neg     Glaucoma Neg       Social History:   Social History     Socioeconomic History    Marital status: Single   Tobacco Use    Smoking status: Never    Smokeless tobacco: Never   Vaping Use    Vaping status: Never Used   Substance and Sexual Activity    Alcohol use: No    Drug  use: No   Other Topics Concern    Caffeine Concern No     Social Drivers of Health      Received from [facility], [facility]    [facility]        Objective:   Physical Exam  Constitutional:       Appearance: She is well-developed.   HENT:      Head: Normocephalic and atraumatic.      Right Ear: External ear normal.      Left Ear: External ear normal.      Nose: Nose normal.   Eyes:      Conjunctiva/sclera: Conjunctivae normal.      Pupils: Pupils are equal, round, and reactive to light.   Cardiovascular:      Rate and Rhythm: Normal rate and regular rhythm.      Heart sounds: Normal heart sounds.   Pulmonary:      Effort: Pulmonary effort is normal.      Breath sounds: Normal breath sounds.   Abdominal:      General: Bowel sounds are normal.      Palpations: Abdomen is soft.   Genitourinary:     Vagina: Normal.   Musculoskeletal:         General: Normal range of motion.      Cervical back: Normal range of motion and neck supple.   Skin:     General: Skin is warm and dry.   Neurological:      Mental Status: She is alert and oriented to person, place, and time.      Deep Tendon Reflexes: Reflexes are normal and symmetric.   Psychiatric:         Behavior: Behavior normal.         Thought Content: Thought content normal.         Judgment: Judgment normal.         Assessment & Plan:   1. Primary hypertension -patient will start taking both medications will monitor blood pressure at home will follow back with me in April but will call us with numbers in few weeks   2. Change in stool habits refer to GI       No orders of the defined types were placed in this encounter.      Meds This Visit:  Requested Prescriptions      No prescriptions requested or ordered in this encounter       Imaging & Referrals:  [facility]STR[facility] - INTERNAL            [1] No Known Allergies

## 2025-03-06 NOTE — H&P
[facility] - Gastroenterology                                                                                                               Reason for consult: diarrhea     Requesting physician or provider: Shawn Mathias MD    Chief Complaint   Patient presents with    Incontinence    Change of Bowel Habits       HPI:   Radha August is a 74 year old year-old female with active diagnoses including type 2 diabetes, hypertension, hyperlipidemia, osteoarthritis. Prior medical/surgical history stroke 2014 in note table.    she is here today for evaluation  #gas  #diarrhea  #fecal urgency  -reports excess gas and worsening diarrhea the past 6 months, now having fecal urgency. Denies specific triggers besides dairy, avoids excess dairy.   -usually has 3 bowel movements daily, bristol stool type 6. Sometimes has abdominal cramping prior to diarrhea  -prior colonoscopy in 2021, 5 year recall for polyp  -diet recall: breakfast: grits or cereal, eggs, sausage  // lunch: none  // dinner: pasta, meat // snack: banana or orange or apple // drinks: water or tea    Patient denies symptoms of nausea, vomiting, dysphagia, odynophagia, globus sensation, heartburn, hematemesis, constipation, hematochezia, or melena. she denies recent change in appetite, fever or unintentional weight loss.      Last colonoscopy: 3/9/2021 Dr. Hamilton - 5 year recall  Polyp  pandiverticulosis   hemorrhoids     Ascending colon polyp:   Tubular adenoma.    Last EGD: none     NSAIDS: none   Tobacco: none   Alcohol: occasional wine   Marijuana: none   Illicit drugs: none     FH GI malignancy: none   FH IBD: none     No history of adverse reaction to sedation  No CELSA  yes anticoagulants/antiplatelet - clopidogrel (Plavix)   No pacemaker/defibrillator    Wt Readings from Last 6 Encounters:   03/11/25 156 lb (70.8 kg)   03/01/25 153 lb 6 oz (69.6 kg)   01/21/25 154  lb (69.9 kg)   01/18/25 154 lb 3.2 oz (69.9 kg)   10/26/24 154 lb (69.9 kg)   10/18/24 148 lb (67.1 kg)        History, Medications, Allergies, ROS:      Past Medical History:    Ametropia    Hyperopia    Diabetes (HCC)    Lipid screening    Numbness and tingling of leg    Other and unspecified hyperlipidemia    Stroke (HCC)    Type II or unspecified type diabetes mellitus without mention of complication, not stated as uncontrolled    Unspecified essential hypertension      Past Surgical History:   Procedure Laterality Date    Colonoscopy      5 years ago date unknown    Cyst aspiration right Right     Hysterectomy      Brendon localization wire 1 site right (cpt=19281)      pt states 20 yrs+ she had a benign cyst removed from her rt breast.     Oophorectomy        Family Hx:   Family History   Problem Relation Age of Onset    Heart Disorder Mother         congestive heart failure    Diabetes Other         Daughter has DM    Breast Cancer Other         maternal cousin    Macular degeneration Neg     Glaucoma Neg       Social History:   Social History     Socioeconomic History    Marital status: Single   Tobacco Use    Smoking status: Never    Smokeless tobacco: Never   Vaping Use    Vaping status: Never Used   Substance and Sexual Activity    Alcohol use: No     Comment: wine occasionally    Drug use: No   Other Topics Concern    Caffeine Concern No     Social Drivers of Health      [facility]        Medications (Active prior to today's visit):  Current Outpatient Medications   Medication Sig Dispense Refill    colestipol 1 g Oral Tab Take 2 tablets (2 g total) by mouth at bedtime. 60 tablet 2    metFORMIN 500 MG Oral Tab Take 1 tablet (500 mg total) by mouth 2 (two) times daily with meals. 180 tablet 3    amLODIPine 5 MG Oral Tab Take 1 tablet (5 mg total) by mouth daily. 90 tablet 1    Alcohol Swabs (DROPSAFE ALCOHOL PREP) 70 % Does not apply Pads Apply 1 Pad topically  daily. Prior to each finger stick 100 each 3    gabapentin 100 MG Oral Cap Take 2 capsules (200 mg total) by mouth at bedtime. 180 capsule 1    glipiZIDE ER 2.5 MG Oral Tablet 24 Hr Take 1 tablet (2.5 mg total) by mouth daily with breakfast. 90 tablet 1    atorvastatin 20 MG Oral Tab Take 1 tablet (20 mg total) by mouth daily. 90 tablet 3    clopidogrel 75 MG Oral Tab Take 1 tablet (75 mg total) by mouth daily. 90 tablet 3    lisinopril-hydroCHLOROthiazide 20-25 MG Oral Tab Take 1 tablet by mouth daily. 90 tablet 3    ketoconazole 2 % External Shampoo Apply 1 Application topically twice a week. 100 mL 0    metoprolol succinate ER 25 MG Oral Tablet 24 Hr Take 1 tablet (25 mg total) by mouth daily. 90 tablet 3    Glucose Blood (TRUE METRIX BLOOD GLUCOSE TEST) In Vitro Strip TEST BLOOD SUGAR EVERY  strip 3    lidocaine 5 % External Patch Place 1 patch onto the skin daily. 90 patch 0    Acetaminophen-Codeine (TYLENOL WITH CODEINE #3) 300-30 MG Oral Tab Take 1 tablet by mouth every 12 (twelve) hours as needed for Pain. 30 tablet 0    Diclofenac Sodium 1 % External Gel Apply 2 g topically 4 (four) times daily. 1 each 0    Pyridoxine HCl (VITAMIN B-6 OR) Take by mouth.      CALCIUM OR Take by mouth.      Cholecalciferol (VITAMIN D-3 OR) Take by mouth.      aspirin 81 MG Oral Tab EC TAKE 1 TABLET BY MOUTH EVERY DAY 30 tablet 2    melatonin 3 MG Oral Tab Take one tab at night 30 min before going to bed 30 tablet 0       Allergies:  Allergies[1]    ROS:   CONSTITUTIONAL: negative for fevers, chills, sweats  EYES Negative for scleral icterus or redness, and diplopia  HEENT: Negative for hoarseness  RESPIRATORY: Negative for cough and severe shortness of breath  CARDIOVASCULAR: Negative for crushing sub-sternal chest pain  GASTROINTESTINAL: See HPI  GENITOURINARY: Negative for dysuria  MUSCULOSKELETAL: Negative for arthralgias and myalgias  SKIN: Negative for jaundice, rash or pruritus  NEUROLOGICAL: Negative for  dizziness and headaches  BEHAVIOR/PSYCH: Negative for psychotic behavior    PHYSICAL EXAM:   Blood pressure 153/88, pulse 93, height 5' 3\" (1.6 m), weight 156 lb (70.8 kg), not currently breastfeeding.    GEN: Alert, no acute distress, well-nourished   HEENT: anicteric sclera, neck supple, trachea midline, MMM, no palpable or tender neck or supraclavicular lymph nodes  CV: RRR, the extremities are warm and well perfused   LUNGS: No increased work of breathing, CTAB  ABDOMEN: Soft, symmetrical, non-tender without distention or guarding. No scars or lesions. Aorta is without bruit or visible pulsation. Umbilicus is midline without herniation. Normoactive bowel sounds are present, No masses, hepatomegaly or splenomegaly noted.  MSK: No erythema, no warmth, no swelling of joints  SKIN: No jaundice, no erythema, no rashes, no lesions  HEMATOLOGIC: No bleeding, no bruising  NEURO: Alert and interactive, YUEN  PSYCH: appropriate mood & affect    Labs/Imaging/Procedures:     Patient's pertinent labs and imaging were reviewed and discussed with patient today.        .  ASSESSMENT/PLAN:   Radha August is a 74 year old year-old female with active diagnoses including type 2 diabetes, hypertension, hyperlipidemia, osteoarthritis. Prior medical/surgical history stroke 2014 in note table.    she is here today for evaluation  #gas  #diarrhea  #fecal urgency  -reports excess gas and worsening diarrhea the past 6 months, now having fecal urgency. Denies specific triggers besides dairy, avoids excess dairy.   -usually has 3 bowel movements daily, bristol stool type 6. Sometimes has abdominal cramping prior to diarrhea  -prior colonoscopy in 2021, 5 year recall for polyp  -diet recall: breakfast: grits or cereal, eggs, sausage  // lunch: none  // dinner: pasta, meat // snack: banana or orange or apple // drinks: water or tea  -recommend C Diff test, repeat CLN to rule out microscopic colitis. Add fiber supplement and trial  colestipol and/or imodium.     Recommendations:  -go to lab to  stool test for C Diff. Tomorrow turn in stool sample.     -for irregular bowel habits  Start taking fiber supplement, such as metamucil (psyllium husk). Take in the afternoon. This can be bought over the counter.  Take colestipol nightly before bed. This is prescription  Okay to take over the counter loperamide (imodium) 2-4mg at a time. Max 8mg daily. This is over the counter     -follow up with me 2-4 weeks after colonoscopy    -------------------------------------------------------------------------------------------------    1. Schedule colonoscopy with Dr. Hamilton  Diagnosis: diarrhea, fecal urgency, CRC screen, hx of colon polyps   Sedation: MAC @ hospital  Prep: split dose miralax /gatorade     2. MEDICATION CHANGES PRIOR TO PROCEDURE    *HOLD metformin & glipizide day before & day of procedure    5 days BEFORE procedure *HOLD clopidogrel (Plavix)    GI RNs please reach out to PCP, for approval to hold anticoagulant/antiplatelet prior to procedure. Takes clopidogrel (Plavix) for hx of stroke. Does NOT need cardiac clearance.     Endoscopy risk/benefit discussion: I have thoroughly discussed the risks, benefits, and alternatives of endoscopic evaluation with the patient, who demonstrated understanding. This includes the potential risks of bleeding, infection, pain, anesthesia complications, and perforation, which may result in prolonged hospitalization or surgical intervention. All of the patient’s questions were addressed to their satisfaction. The patient has chosen to proceed with the endoscopic procedure, including any necessary interventions such as polypectomy, biopsy, control of bleeding.            Orders This Visit:  Orders Placed This Encounter   Procedures    C. diff toxigenic PCR (OPT)       Meds This Visit:  Requested Prescriptions     Signed Prescriptions Disp Refills    colestipol 1 g Oral Tab 60 tablet 2     Sig: Take 2  tablets (2 g total) by mouth at bedtime.       Imaging & Referrals:  None      SISSY Monzon    [facility]  3/11/2025        This note was partially prepared using Dragon Medical voice recognition dictation software. As a result, errors may occur. When identified, these errors have been corrected. While every attempt is made to correct errors during dictation, discrepancies may still exist.          [1] No Known Allergies

## 2025-03-11 ENCOUNTER — TELEPHONE (OUTPATIENT)
Facility: CLINIC | Age: 75
End: 2025-03-11

## 2025-03-11 ENCOUNTER — OFFICE VISIT (OUTPATIENT)
Facility: CLINIC | Age: 75
End: 2025-03-11
Payer: MEDICARE

## 2025-03-11 VITALS
WEIGHT: 156 LBS | BODY MASS INDEX: 27.64 KG/M2 | SYSTOLIC BLOOD PRESSURE: 153 MMHG | HEIGHT: 63 IN | DIASTOLIC BLOOD PRESSURE: 88 MMHG | HEART RATE: 93 BPM

## 2025-03-11 DIAGNOSIS — R15.2 FECAL URGENCY: ICD-10-CM

## 2025-03-11 DIAGNOSIS — R19.7 DIARRHEA, UNSPECIFIED TYPE: Primary | ICD-10-CM

## 2025-03-11 DIAGNOSIS — Z12.11 COLON CANCER SCREENING: ICD-10-CM

## 2025-03-11 DIAGNOSIS — Z86.0100 PERSONAL HISTORY OF COLON POLYPS, UNSPECIFIED: ICD-10-CM

## 2025-03-11 PROCEDURE — 3079F DIAST BP 80-89 MM HG: CPT

## 2025-03-11 PROCEDURE — 3008F BODY MASS INDEX DOCD: CPT

## 2025-03-11 PROCEDURE — 1160F RVW MEDS BY RX/DR IN RCRD: CPT

## 2025-03-11 PROCEDURE — 99204 OFFICE O/P NEW MOD 45 MIN: CPT

## 2025-03-11 PROCEDURE — 1159F MED LIST DOCD IN RCRD: CPT

## 2025-03-11 PROCEDURE — 3077F SYST BP >= 140 MM HG: CPT

## 2025-03-11 RX ORDER — COLESTIPOL HYDROCHLORIDE 1 G/1
2 TABLET ORAL NIGHTLY
Qty: 60 TABLET | Refills: 2 | Status: SHIPPED | OUTPATIENT
Start: 2025-03-11 | End: 2025-06-09

## 2025-03-11 NOTE — TELEPHONE ENCOUNTER
Patient was seen in office today (3/11/2025) by SISSY Alatorre . Provided patient with office number and prep instructions. Reviewed prep instructions with patient in office, verbalized understanding. Patient aware GI schedulers will call patient to schedule the procedure.      Procedure orders:    Schedule: Colonoscopy with Dr Hamilton @ [facility]  Diagnosis:     ICD-10-CM   1. Diarrhea, unspecified type  R19.7   2. Fecal urgency  R15.2   3. Colon cancer screening  Z12.11   4. Personal history of colon polyps, unspecified  Z86.0100      Sedation: MAC   Prep: Split Miralax     Medication changes prior to procedure:   *HOLD metformin & glipizide day before & day of procedure     5 days BEFORE procedure *HOLD clopidogrel (Plavix)     GI RNs please reach out to PCP, for approval to hold anticoagulant/antiplatelet prior to procedure. Takes clopidogrel (Plavix) for hx of stroke. Does NOT need cardiac clearance.

## 2025-03-11 NOTE — PATIENT INSTRUCTIONS
-go to lab to  stool test for C Diff. Tomorrow turn in stool sample.     -for irregular bowel habits  Start taking fiber supplement, such as metamucil (psyllium husk). Take in the afternoon. This can be bought over the counter.  Take colestipol nightly before bed. This is prescription  Okay to take over the counter loperamide (imodium) 2-4mg at a time. Max 8mg daily. This is over the counter     -follow up with me 2-4 weeks after colonoscopy    -------------------------------------------------------------------------------------------------    1. Schedule colonoscopy with Dr. Hamilton  Diagnosis: diarrhea, fecal urgency, CRC screen, hx of colon polyps   Sedation: MAC @ [facility]  Prep: split dose miralax /gatorade     2. MEDICATION CHANGES PRIOR TO PROCEDURE    *HOLD metformin & glipizide day before & day of procedure    5 days BEFORE procedure *HOLD clopidogrel (Plavix)    GI RNs please reach out to PCP, for approval to hold anticoagulant/antiplatelet prior to procedure. Takes clopidogrel (Plavix) for hx of stroke. Does NOT need cardiac clearance.     3.  bowel prep from pharmacy   You can pick the bowel prep up now and store in a cool, dry place in your home until your scheduled bowel prep start date.    4. Read all bowel prep instructions carefully. Bowel prep instructions can also be found online at:  [email or web address]     5. AVOID seeds, nuts, popcorn, raw fruits and vegetables for 5 days before procedure    6. If you start any NEW medication after your visit today, please notify us. Certain medications (like iron or weight loss medications) will need to be held before the procedure, or the procedure cannot be performed safely.

## 2025-03-12 NOTE — TELEPHONE ENCOUNTER
Yes patient can hold medication patient needs to be aware that even thou the  chances of having another stroke are very low it is never 0 so she needs to be okay with taking that risk

## 2025-03-12 NOTE — TELEPHONE ENCOUNTER
Scheduled for: Colonoscopy [address]3905    Provider Name:  Dr Hamilton    Date:  7/28/2025    Location:    [facility]    Sedation:  MAC    Time:  930 am (Patient made aware EM will call the day before with procedure/arrival time)    Prep:  Miralax/Gatorade    Meds/Allergies Reconciled?:  Physician reviewed     Diagnosis with codes:    Diarrhea, unspecified type [R19.7]  Fecal urgency [R15.2]   Colon cancer screening [Z12.11]  Personal history of colon polyps, unspecified [Z86.0100]     Was patient informed to call insurance with codes (Y/N):  Yes, I confirmed [employer] H[employer] insurance with the patient.     Referral sent?:  N/A    EM or [facility] notified?:  I sent an electronic request to Endo Scheduling and received a confirmation today.      Medication Orders:  This patient verbally confirmed that she is not taking:    is diabetic    No latex allergy, No PCN allergy and does not have a pacemaker     Misc Orders:    Hold Iron 7 days prior to procedure  Hold Plavix 5 days prior to procedure (awaiting Cardiology approval - GI Nurse will call to confirm length of hold)    Hold Metformin the day prior to the procedure and the day of the procedure  Hold Glipizide the day prior to the procedure and the day of the procedure    Plavix managed by Dr Mathias     Further instructions given by staff:   I discussed the prep instructions with the patient which she verbally understood and is aware that I will send the instructions today via [employer].    Advised patient:    You will not be able to drive, operate machinery or make critical decisions the day of your procedure. Please make arrangements for transportation. You must have a  (age 18 or older) to accompany you, stay in the facility for the duration of your procedure and drive you home after the procedure.  You cannot use public transportation ([employer], Lyft, Taxi). The procedure involves sedation, and you will not be allowed to leave unaccompanied. Your procedure will not proceed forward if  you're unable to confirm your  planned to escort you home.    Advised Patient:    [facility] requires payment of copay and any patient responsibility at the time of registration.   The [facility] requires copay and 50% of the patient responsibility at the time of service for all Esophagogastroduodenoscopy and diagnostic Colonoscopies.     They do offer payment plans and Care Credit options if unable to pay the full amount at the time of registration.     If you have any questions regarding your potential responsibility, please contact [facility] Insurance Department at [phone] option 1.    You may receive 4 bills related to your medical procedure:   [facility] (the facility)  The procedural physician  The anesthesiologist  The pathology lab (if applicable)

## 2025-03-12 NOTE — TELEPHONE ENCOUNTER
Dr. Mathias,   Pt is scheduled for a colonoscopy on 7/28/25. GI service is requesting that pt hold her plavix for 5 days before procedure. Are you agreeable?  Thank you,  Wanda

## 2025-03-12 NOTE — TELEPHONE ENCOUNTER
Patient is returning the schedulers call. Schedulers was not available, during the time of the call. Patient was informed that she will get a call back.

## 2025-03-13 NOTE — TELEPHONE ENCOUNTER
RN called and spoke to pt, informed her of Dr. Mathias's message below. Pt states she is agreeable to holding the plavix for 5 days before. She states she held it for 5 days before last colonoscopy, RN confirmed she did per chart notes.    [employer] message sent stating that last dose of plavix would be Tuesday 7/22/25.

## 2025-03-27 PROBLEM — Z86.73 HISTORY OF CEREBRAL INFARCTION: Status: ACTIVE | Noted: 2025-03-27

## 2025-04-05 ENCOUNTER — OFFICE VISIT (OUTPATIENT)
Dept: INTERNAL MEDICINE CLINIC | Facility: CLINIC | Age: 75
End: 2025-04-05

## 2025-04-05 ENCOUNTER — LAB ENCOUNTER (OUTPATIENT)
Dept: LAB | Age: 75
End: 2025-04-05
Attending: INTERNAL MEDICINE
Payer: MEDICARE

## 2025-04-05 ENCOUNTER — EKG ENCOUNTER (OUTPATIENT)
Dept: LAB | Age: 75
End: 2025-04-05
Attending: INTERNAL MEDICINE
Payer: MEDICARE

## 2025-04-05 VITALS
BODY MASS INDEX: 27.18 KG/M2 | DIASTOLIC BLOOD PRESSURE: 84 MMHG | OXYGEN SATURATION: 100 % | SYSTOLIC BLOOD PRESSURE: 138 MMHG | TEMPERATURE: 98 F | HEART RATE: 85 BPM | HEIGHT: 63 IN | RESPIRATION RATE: 18 BRPM | WEIGHT: 153.38 LBS

## 2025-04-05 DIAGNOSIS — H25.13 AGE-RELATED NUCLEAR CATARACT OF BOTH EYES: ICD-10-CM

## 2025-04-05 DIAGNOSIS — Z00.00 MEDICARE ANNUAL WELLNESS VISIT, SUBSEQUENT: ICD-10-CM

## 2025-04-05 DIAGNOSIS — I10 PRIMARY HYPERTENSION: ICD-10-CM

## 2025-04-05 DIAGNOSIS — E11.40 TYPE 2 DIABETES MELLITUS WITH DIABETIC NEUROPATHY, WITHOUT LONG-TERM CURRENT USE OF INSULIN (HCC): ICD-10-CM

## 2025-04-05 DIAGNOSIS — Z00.00 MEDICARE ANNUAL WELLNESS VISIT, SUBSEQUENT: Primary | ICD-10-CM

## 2025-04-05 DIAGNOSIS — I10 ESSENTIAL HYPERTENSION: ICD-10-CM

## 2025-04-05 DIAGNOSIS — E78.5 HYPERLIPIDEMIA, UNSPECIFIED HYPERLIPIDEMIA TYPE: ICD-10-CM

## 2025-04-05 DIAGNOSIS — I50.32 CHRONIC DIASTOLIC CONGESTIVE HEART FAILURE, NYHA CLASS 1 (HCC): ICD-10-CM

## 2025-04-05 DIAGNOSIS — R20.0 HAND NUMBNESS: ICD-10-CM

## 2025-04-05 DIAGNOSIS — E11.9 COMPREHENSIVE DIABETIC FOOT EXAMINATION, TYPE 2 DM, ENCOUNTER FOR (HCC): ICD-10-CM

## 2025-04-05 DIAGNOSIS — I65.23 CAROTID ARTERY PLAQUE, BILATERAL: ICD-10-CM

## 2025-04-05 DIAGNOSIS — Z86.73 HISTORY OF CEREBRAL INFARCTION: ICD-10-CM

## 2025-04-05 LAB
ALBUMIN SERPL-MCNC: 4.8 G/DL (ref 3.2–4.8)
ALBUMIN/GLOB SERPL: 1.5 {RATIO} (ref 1–2)
ALP LIVER SERPL-CCNC: 67 U/L
ALT SERPL-CCNC: 16 U/L
ANION GAP SERPL CALC-SCNC: 11 MMOL/L (ref 0–18)
AST SERPL-CCNC: 19 U/L (ref ?–34)
BASOPHILS # BLD AUTO: 0 X10(3) UL (ref 0–0.2)
BASOPHILS NFR BLD AUTO: 0 %
BILIRUB SERPL-MCNC: 0.4 MG/DL (ref 0.2–1.1)
BILIRUB UR QL: NEGATIVE
BUN BLD-MCNC: 22 MG/DL (ref 9–23)
BUN/CREAT SERPL: 21.2 (ref 10–20)
CALCIUM BLD-MCNC: 10.4 MG/DL (ref 8.7–10.4)
CHLORIDE SERPL-SCNC: 100 MMOL/L (ref 98–112)
CHOLEST SERPL-MCNC: 109 MG/DL (ref ?–200)
CLARITY UR: CLEAR
CO2 SERPL-SCNC: 28 MMOL/L (ref 21–32)
CREAT BLD-MCNC: 1.04 MG/DL
CREAT UR-SCNC: 129.6 MG/DL
DEPRECATED RDW RBC AUTO: 42.9 FL (ref 35.1–46.3)
EGFRCR SERPLBLD CKD-EPI 2021: 56 ML/MIN/1.73M2 (ref 60–?)
EOSINOPHIL # BLD AUTO: 0.02 X10(3) UL (ref 0–0.7)
EOSINOPHIL NFR BLD AUTO: 0.4 %
ERYTHROCYTE [DISTWIDTH] IN BLOOD BY AUTOMATED COUNT: 14.6 % (ref 11–15)
EST. AVERAGE GLUCOSE BLD GHB EST-MCNC: 166 MG/DL (ref 68–126)
FASTING PATIENT LIPID ANSWER: YES
FASTING STATUS PATIENT QL REPORTED: YES
GLOBULIN PLAS-MCNC: 3.1 G/DL (ref 2–3.5)
GLUCOSE BLD-MCNC: 144 MG/DL (ref 70–99)
GLUCOSE UR-MCNC: NORMAL MG/DL
HBA1C MFR BLD: 7.4 % (ref ?–5.7)
HCT VFR BLD AUTO: 36.5 %
HDLC SERPL-MCNC: 55 MG/DL (ref 40–59)
HGB BLD-MCNC: 11.6 G/DL
HGB UR QL STRIP.AUTO: NEGATIVE
IMM GRANULOCYTES # BLD AUTO: 0.01 X10(3) UL (ref 0–1)
IMM GRANULOCYTES NFR BLD: 0.2 %
LDLC SERPL CALC-MCNC: 42 MG/DL (ref ?–100)
LEUKOCYTE ESTERASE UR QL STRIP.AUTO: 250
LYMPHOCYTES # BLD AUTO: 1.58 X10(3) UL (ref 1–4)
LYMPHOCYTES NFR BLD AUTO: 31.4 %
MCH RBC QN AUTO: 25.7 PG (ref 26–34)
MCHC RBC AUTO-ENTMCNC: 31.8 G/DL (ref 31–37)
MCV RBC AUTO: 80.9 FL
MICROALBUMIN UR-MCNC: <0.3 MG/DL
MONOCYTES # BLD AUTO: 0.31 X10(3) UL (ref 0.1–1)
MONOCYTES NFR BLD AUTO: 6.2 %
NEUTROPHILS # BLD AUTO: 3.11 X10 (3) UL (ref 1.5–7.7)
NEUTROPHILS # BLD AUTO: 3.11 X10(3) UL (ref 1.5–7.7)
NEUTROPHILS NFR BLD AUTO: 61.8 %
NITRITE UR QL STRIP.AUTO: NEGATIVE
NONHDLC SERPL-MCNC: 54 MG/DL (ref ?–130)
OSMOLALITY SERPL CALC.SUM OF ELEC: 294 MOSM/KG (ref 275–295)
PH UR: 5 [PH] (ref 5–8)
PLATELET # BLD AUTO: 263 10(3)UL (ref 150–450)
POTASSIUM SERPL-SCNC: 4 MMOL/L (ref 3.5–5.1)
PROT SERPL-MCNC: 7.9 G/DL (ref 5.7–8.2)
PROT UR-MCNC: NEGATIVE MG/DL
RBC # BLD AUTO: 4.51 X10(6)UL
SODIUM SERPL-SCNC: 139 MMOL/L (ref 136–145)
SP GR UR STRIP: 1.02 (ref 1–1.03)
TRIGL SERPL-MCNC: 47 MG/DL (ref 30–149)
TSI SER-ACNC: 0.79 UIU/ML (ref 0.55–4.78)
UROBILINOGEN UR STRIP-ACNC: NORMAL
VLDLC SERPL CALC-MCNC: 7 MG/DL (ref 0–30)
WBC # BLD AUTO: 5 X10(3) UL (ref 4–11)

## 2025-04-05 PROCEDURE — 81001 URINALYSIS AUTO W/SCOPE: CPT

## 2025-04-05 PROCEDURE — 84443 ASSAY THYROID STIM HORMONE: CPT

## 2025-04-05 PROCEDURE — 82043 UR ALBUMIN QUANTITATIVE: CPT

## 2025-04-05 PROCEDURE — 93010 ELECTROCARDIOGRAM REPORT: CPT | Performed by: INTERNAL MEDICINE

## 2025-04-05 PROCEDURE — 80053 COMPREHEN METABOLIC PANEL: CPT

## 2025-04-05 PROCEDURE — 36415 COLL VENOUS BLD VENIPUNCTURE: CPT

## 2025-04-05 PROCEDURE — 85025 COMPLETE CBC W/AUTO DIFF WBC: CPT

## 2025-04-05 PROCEDURE — 83036 HEMOGLOBIN GLYCOSYLATED A1C: CPT

## 2025-04-05 PROCEDURE — 93005 ELECTROCARDIOGRAM TRACING: CPT

## 2025-04-05 PROCEDURE — 80061 LIPID PANEL: CPT

## 2025-04-05 PROCEDURE — 82570 ASSAY OF URINE CREATININE: CPT

## 2025-04-05 NOTE — PROGRESS NOTES
Subjective:   Radha August is a 74 year old female who presents for a MA AHA (Medicare Advantage Annual Health Assessment) and Subsequent Annual Wellness visit (Pt already had Initial Annual Wellness) and .   Patient comes in for Medicare annual overall doing okay denies any complaints        History/Other:   Fall Risk Assessment:   She has been screened for Falls and is low risk.      Cognitive Assessment:   She had a completely normal cognitive assessment - see flowsheet entries     Functional Ability/Status:   Radha August has some abnormal functions as listed below:  She has Driving difficulties based on screening of functional status. She has difficulties Affording Meds based on screening of functional status. She has Vision problems based on screening of functional status. She has Walking problems based on screening of functional status.       Depression Screening (PHQ):  PHQ-2 SCORE: 0  , done 4/5/2025   Last [place] Suicide Screening on 4/5/2025 was No Risk.          Advanced Directives:   She does NOT have a Living Will. [Do you have a living will?: No]  She does NOT have a Power of  for Health Care. [Do you have a healthcare power of ?: No]  Discussed Advance Care Planning with patient (and family/surrogate if present). Standard forms made available to patient in After Visit Summary.      Patient Active Problem List   Diagnosis   • Essential hypertension   • Hyperlipidemia   • Speech disturbance   • Abnormality of gait as late effect of cerebrovascular accident (CVA)   • Age-related nuclear cataract of both eyes   • Type 2 diabetes mellitus with diabetic neuropathy, without long-term current use of insulin ([facility])   • Carotid artery plaque, bilateral   • Chronic diastolic congestive heart failure, NYHA class 1 ([facility])   • Diabetes mellitus type 2 without retinopathy ([facility])   • Floater, vitreous, bilateral   • Primary osteoarthritis of right knee   • Hemarthrosis of right knee   •  Postmenopausal   • Spastic hemiplegia of right dominant side as late effect of cerebral infarction (HCC)   • Primary osteoarthritis of both knees   • Mild anemia   • History of cerebral infarction     Allergies:  She has No Known Allergies.    Current Medications:  Outpatient Medications Marked as Taking for the 4/5/25 encounter (Office Visit) with Shawn Mathias MD   Medication Sig   • colestipol 1 g Oral Tab Take 2 tablets (2 g total) by mouth at bedtime.   • metFORMIN 500 MG Oral Tab Take 1 tablet (500 mg total) by mouth 2 (two) times daily with meals.   • amLODIPine 5 MG Oral Tab Take 1 tablet (5 mg total) by mouth daily.   • Alcohol Swabs (DROPSAFE ALCOHOL PREP) 70 % Does not apply Pads Apply 1 Pad topically daily. Prior to each finger stick   • gabapentin 100 MG Oral Cap Take 2 capsules (200 mg total) by mouth at bedtime.   • glipiZIDE ER 2.5 MG Oral Tablet 24 Hr Take 1 tablet (2.5 mg total) by mouth daily with breakfast.   • atorvastatin 20 MG Oral Tab Take 1 tablet (20 mg total) by mouth daily.   • clopidogrel 75 MG Oral Tab Take 1 tablet (75 mg total) by mouth daily.   • lisinopril-hydroCHLOROthiazide 20-25 MG Oral Tab Take 1 tablet by mouth daily.   • ketoconazole 2 % External Shampoo Apply 1 Application topically twice a week.   • metoprolol succinate ER 25 MG Oral Tablet 24 Hr Take 1 tablet (25 mg total) by mouth daily.   • Glucose Blood (TRUE METRIX BLOOD GLUCOSE TEST) In Vitro Strip TEST BLOOD SUGAR EVERY DAY   • lidocaine 5 % External Patch Place 1 patch onto the skin daily.   • Acetaminophen-Codeine (TYLENOL WITH CODEINE #3) 300-30 MG Oral Tab Take 1 tablet by mouth every 12 (twelve) hours as needed for Pain.   • Diclofenac Sodium 1 % External Gel Apply 2 g topically 4 (four) times daily.   • Pyridoxine HCl (VITAMIN B-6 OR) Take by mouth.   • CALCIUM OR Take by mouth.   • Cholecalciferol (VITAMIN D-3 OR) Take by mouth.   • aspirin 81 MG Oral Tab EC TAKE 1 TABLET BY MOUTH EVERY DAY   • melatonin 3 MG  Oral Tab Take one tab at night 30 min before going to bed     Current Facility-Administered Medications for the 4/5/25 encounter (Office Visit) with Shawn Mathias MD   Medication   • triamcinolone acetonide (Kenalog-40) 40 MG/ML injection 40 mg       Medical History:  She  has a past medical history of Ametropia, Diabetes (HCC), Lipid screening (5/29/2014), Numbness and tingling of leg, Other and unspecified hyperlipidemia, Stroke (HCC) (1/2014), Type II or unspecified type diabetes mellitus without mention of complication, not stated as uncontrolled, and Unspecified essential hypertension.  Surgical History:  She  has a past surgical history that includes hysterectomy; kenn localization wire 1 site right (cpt=19281); cyst aspiration right (Right); colonoscopy; and oophorectomy.   Family History:  Her family history includes Breast Cancer in an other family member; Diabetes in an other family member; Heart Disorder in her mother.  Social History:  She  reports that she has never smoked. She has never used smokeless tobacco. She reports that she does not drink alcohol and does not use drugs.    Tobacco:  She has never smoked tobacco.    CAGE Alcohol Screen:   CAGE screening score of 0 on 4/5/2025, showing low risk of alcohol abuse.      Patient Care Team:  Shawn Mathias MD as PCP - General (Internal Medicine)    Review of Systems   Constitutional: Negative.    HENT: Negative.     Eyes: Negative.    Respiratory: Negative.     Cardiovascular: Negative.    Gastrointestinal: Negative.    Genitourinary: Negative.    Musculoskeletal: Negative.    Skin: Negative.    Neurological:  Positive for weakness.   Psychiatric/Behavioral: Negative.            Objective:   Physical Exam  Vitals and nursing note reviewed.   Constitutional:       Appearance: She is well-developed.   HENT:      Head: Normocephalic and atraumatic.      Right Ear: External ear normal.      Left Ear: External ear normal.      Nose: Nose normal.   Eyes:       Conjunctiva/sclera: Conjunctivae normal.      Pupils: Pupils are equal, round, and reactive to light.   Cardiovascular:      Rate and Rhythm: Normal rate and regular rhythm.      Heart sounds: Normal heart sounds.   Pulmonary:      Effort: Pulmonary effort is normal.      Breath sounds: Normal breath sounds.   Abdominal:      General: Bowel sounds are normal.      Palpations: Abdomen is soft.   Genitourinary:     Vagina: Normal.   Musculoskeletal:         General: Normal range of motion.      Cervical back: Normal range of motion and neck supple.      Comments: Bilateral barefoot skin diabetic exam is normal, visualized feet and the appearance is normal.  Bilateral monofilament/sensation of both feet is normal.  Pulsation pedal pulse exam of both lower legs/feet is normal as well.         Skin:     General: Skin is warm and dry.   Neurological:      Mental Status: She is alert and oriented to person, place, and time. Mental status is at baseline.      Motor: Weakness present.      Deep Tendon Reflexes: Reflexes are normal and symmetric.      Comments: Right-sided weakness arm and leg due to prior stroke   Psychiatric:         Behavior: Behavior normal.         Thought Content: Thought content normal.         Judgment: Judgment normal.          /84   Pulse 85   Temp 97.6 °F (36.4 °C) (Temporal)   Resp 18   Ht 5' 3\" (1.6 m)   Wt 153 lb 6 oz (69.6 kg)   SpO2 100%   BMI 27.17 kg/m²  Estimated body mass index is 27.17 kg/m² as calculated from the following:    Height as of this encounter: 5' 3\" (1.6 m).    Weight as of this encounter: 153 lb 6 oz (69.6 kg).    Medicare Hearing Assessment:   Hearing Screening    Time taken: 4/5/2025 11:20 AM  Entry User: Eloise Diop MA  Screening Method: Questionnaire  I have a problem hearing over the telephone: No I have trouble following the conversations when two or more people are talking at the same time: No   I have trouble understanding things on the TV: No I  have to strain to understand conversations: No   I have to worry about missing the telephone ring or doorbell: No I have trouble hearing conversations in a noisy background such as a crowded room or restaurant: No   I get confused about where sounds come from: No I misunderstand some words in a sentence and need to ask people to repeat themselves: No   I especially have trouble understanding the speech of women and children: No I have trouble understanding the speaker in a large room such as at a meeting or place of Buddhism: No   Many people I talk to seem to mumble (or don't speak clearly): No People get annoyed because I misunderstand what they say: No   I misunderstand what others are saying and make inappropriate responses: No I avoid social activities because I cannot hear well and fear I will reply improperly: No   Family members and friends have told me they think I may have hearing loss: No             Visual Acuity:   Right Eye Visual Acuity: Corrected Right Eye Chart Acuity: 20/25   Left Eye Visual Acuity: Corrected Left Eye Chart Acuity: 20/25   Both Eyes Visual Acuity: Corrected Both Eyes Chart Acuity: 20/25   Able To Tolerate Visual Acuity: Yes        Assessment & Plan:   Radha August is a 74 year old female who presents for a Medicare Assessment.     1. Medicare annual wellness visit, subsequent (Primary) exam is okay overall labs  -     CBC With Differential With Platelet; Future; Expected date: 04/05/2025  -     Comp Metabolic Panel (14); Future; Expected date: 04/05/2025  -     Lipid Panel; Future; Expected date: 04/05/2025  -     TSH W Reflex To Free T4; Future; Expected date: 04/05/2025  -     Hemoglobin A1C; Future; Expected date: 04/05/2025  -     Urinalysis, Routine; Future; Expected date: 04/05/2025  -     Microalb/Creat Ratio, Random Urine; Future; Expected date: 04/05/2025  -     EKG 12 Lead; Future; Expected date: 04/05/2025  2. Type 2 diabetes mellitus with diabetic neuropathy,  without long-term current use of insulin (HCC) will retest continue current treatment watch diet  -     CBC With Differential With Platelet; Future; Expected date: 04/05/2025  -     Comp Metabolic Panel (14); Future; Expected date: 04/05/2025  -     Lipid Panel; Future; Expected date: 04/05/2025  -     TSH W Reflex To Free T4; Future; Expected date: 04/05/2025  -     Hemoglobin A1C; Future; Expected date: 04/05/2025  -     Urinalysis, Routine; Future; Expected date: 04/05/2025  -     Microalb/Creat Ratio, Random Urine; Future; Expected date: 04/05/2025  -     EKG 12 Lead; Future; Expected date: 04/05/2025  3. Primary hypertension well-controlled continue current treatment  -     CBC With Differential With Platelet; Future; Expected date: 04/05/2025  -     Comp Metabolic Panel (14); Future; Expected date: 04/05/2025  -     Lipid Panel; Future; Expected date: 04/05/2025  -     TSH W Reflex To Free T4; Future; Expected date: 04/05/2025  -     Hemoglobin A1C; Future; Expected date: 04/05/2025  -     Urinalysis, Routine; Future; Expected date: 04/05/2025  -     Microalb/Creat Ratio, Random Urine; Future; Expected date: 04/05/2025  -     EKG 12 Lead; Future; Expected date: 04/05/2025  4. Hand numbness due to carpal tunnel follow-up with physiatry  -     CBC With Differential With Platelet; Future; Expected date: 04/05/2025  -     Comp Metabolic Panel (14); Future; Expected date: 04/05/2025  -     Lipid Panel; Future; Expected date: 04/05/2025  -     TSH W Reflex To Free T4; Future; Expected date: 04/05/2025  -     Hemoglobin A1C; Future; Expected date: 04/05/2025  -     Urinalysis, Routine; Future; Expected date: 04/05/2025  -     Microalb/Creat Ratio, Random Urine; Future; Expected date: 04/05/2025  -     EKG 12 Lead; Future; Expected date: 04/05/2025  6. Hyperlipidemia, unspecified hyperlipidemia type continue current treatment watch diet take medication  -     CBC With Differential With Platelet; Future; Expected date:  04/05/2025  -     Comp Metabolic Panel (14); Future; Expected date: 04/05/2025  -     Lipid Panel; Future; Expected date: 04/05/2025  -     TSH W Reflex To Free T4; Future; Expected date: 04/05/2025  -     Hemoglobin A1C; Future; Expected date: 04/05/2025  -     Urinalysis, Routine; Future; Expected date: 04/05/2025  -     Microalb/Creat Ratio, Random Urine; Future; Expected date: 04/05/2025  -     EKG 12 Lead; Future; Expected date: 04/05/2025  7. Age-related nuclear cataract of both eyesfollows with eye specialist  -     CBC With Differential With Platelet; Future; Expected date: 04/05/2025  -     Comp Metabolic Panel (14); Future; Expected date: 04/05/2025  -     Lipid Panel; Future; Expected date: 04/05/2025  -     TSH W Reflex To Free T4; Future; Expected date: 04/05/2025  -     Hemoglobin A1C; Future; Expected date: 04/05/2025  -     Urinalysis, Routine; Future; Expected date: 04/05/2025  -     Microalb/Creat Ratio, Random Urine; Future; Expected date: 04/05/2025  -     EKG 12 Lead; Future; Expected date: 04/05/2025  8. Chronic diastolic congestive heart failure, NYHA class 1 (HCC) chronic stable medical management  -     CBC With Differential With Platelet; Future; Expected date: 04/05/2025  -     Comp Metabolic Panel (14); Future; Expected date: 04/05/2025  -     Lipid Panel; Future; Expected date: 04/05/2025  -     TSH W Reflex To Free T4; Future; Expected date: 04/05/2025  -     Hemoglobin A1C; Future; Expected date: 04/05/2025  -     Urinalysis, Routine; Future; Expected date: 04/05/2025  -     Microalb/Creat Ratio, Random Urine; Future; Expected date: 04/05/2025  -     EKG 12 Lead; Future; Expected date: 04/05/2025  9. History of cerebral infarction chronic stable  -     US CAROTID DOPPLER BILAT - DIAG IMG (CPT=93880); Future; Expected date: 04/05/2025  10. Carotid artery plaque, bilateral medical management will order ultrasound carotids  -     US CAROTID DOPPLER BILAT - DIAG IMG (CPT=93880); Future;  Expected date: 04/05/2025  11. Comprehensive diabetic foot examination, type 2 DM, encounter for (HCC)Bilateral barefoot skin diabetic exam is normal, visualized feet and the appearance is normal.  Bilateral monofilament/sensation of both feet is normal.  Pulsation pedal pulse exam of both lower legs/feet is normal as well.        The patient indicates understanding of these issues and agrees to the plan.  Reinforced healthy diet, lifestyle, and exercise.            No follow-ups on file.     Shawn Mathias MD, 4/5/2025     Supplementary Documentation:   General Health:  In the past six months, have you lost more than 10 pounds without trying?: 2 - No  Has your appetite been poor?: No  Type of Diet: Diabetic  How does the patient maintain a good energy level?: Daily Walks  How would you describe your daily physical activity?: Light  How would you describe your current health state?: Fair  How do you maintain positive mental well-being?: Games, Visiting Friends, Visiting Family  On a scale of 0 to 10, with 0 being no pain and 10 being severe pain, what is your pain level?: 0 - (None)  In the past six months, have you experienced urine leakage?: 0-No  At any time do you feel concerned for the safety/well-being of yourself and/or your children, in your home or elsewhere?: No  Have you had any immunizations at another office such as Influenza, Hepatitis B, Tetanus, or Pneumococcal?: No    Health Maintenance   Topic Date Due   • Zoster Vaccines (2 of 2) 12/07/2018   • COVID-19 Vaccine (4 - 2024-25 season) 09/01/2024   • Annual Well Visit  01/01/2025   • Diabetes Care: Foot Exam (Annual)  01/01/2025   • Diabetes Care: Microalb/Creat Ratio (Annual)  01/01/2025   • Diabetes Care A1C  07/18/2025   • Diabetes Care: GFR  10/26/2025   • Mammogram  11/04/2025   • Diabetes Care Dilated Eye Exam  01/22/2026   • Colorectal Cancer Screening  03/09/2026   • Influenza Vaccine  Completed   • DEXA Scan  Completed   • Annual Depression  Screening  Completed   • Fall Risk Screening (Annual)  Completed   • Pneumococcal Vaccine: 50+ Years  Completed   • Meningococcal B Vaccine  Aged Out

## 2025-04-06 LAB
ATRIAL RATE: 75 BPM
P AXIS: 51 DEGREES
P-R INTERVAL: 116 MS
Q-T INTERVAL: 392 MS
QRS DURATION: 92 MS
QTC CALCULATION (BEZET): 437 MS
R AXIS: -23 DEGREES
T AXIS: 37 DEGREES
VENTRICULAR RATE: 75 BPM

## 2025-04-14 ENCOUNTER — TELEPHONE (OUTPATIENT)
Facility: CLINIC | Age: 75
End: 2025-04-14

## 2025-04-14 NOTE — TELEPHONE ENCOUNTER
1st reminder letter sent out via mail and [employer] for the following:   C. diff toxigenic PCR (OPT) (Order #[id number]) on 3/11/25

## 2025-04-22 ENCOUNTER — HOSPITAL ENCOUNTER (OUTPATIENT)
Dept: ULTRASOUND IMAGING | Facility: HOSPITAL | Age: 75
Discharge: HOME | End: 2025-04-22
Attending: INTERNAL MEDICINE
Payer: MEDICARE

## 2025-04-22 DIAGNOSIS — Z86.73 HISTORY OF CEREBRAL INFARCTION: ICD-10-CM

## 2025-04-22 DIAGNOSIS — I65.23 CAROTID ARTERY PLAQUE, BILATERAL: ICD-10-CM

## 2025-04-22 PROCEDURE — 93880 EXTRACRANIAL BILAT STUDY: CPT | Performed by: INTERNAL MEDICINE

## 2025-04-28 RX ORDER — AMLODIPINE BESYLATE 5 MG/1
5 TABLET ORAL DAILY
Qty: 90 TABLET | Refills: 3 | Status: SHIPPED | OUTPATIENT
Start: 2025-04-28

## 2025-04-28 RX ORDER — GLIPIZIDE 2.5 MG/1
2.5 TABLET, EXTENDED RELEASE ORAL
Qty: 90 TABLET | Refills: 3 | Status: SHIPPED | OUTPATIENT
Start: 2025-04-28

## 2025-04-28 RX ORDER — GLIPIZIDE 2.5 MG/1
2.5 TABLET, EXTENDED RELEASE ORAL
Qty: 30 TABLET | Refills: 0 | Status: SHIPPED | OUTPATIENT
Start: 2025-04-28

## 2025-04-28 RX ORDER — AMLODIPINE BESYLATE 5 MG/1
5 TABLET ORAL DAILY
Qty: 30 TABLET | Refills: 0 | Status: SHIPPED | OUTPATIENT
Start: 2025-04-28

## 2025-04-28 NOTE — TELEPHONE ENCOUNTER
Patient calling for refills.  Needs short term sent to her [facility], then long term sent to [facility].  Glipizide ER 2.5 mg  Amlodipine 5 mg    This RN called [facility].  Per eliud Ferrari, there is one 90 day refill on amlodipine and no refills for glipizide.  He will initiate refill for amlodipine.   They have tried to call patient without response.  Advised that patient call their customer service line  [phone]  to set up auto refills.    This RN called patient back and left message as above for patient.    Pended and routed to refill pool for refills.     RN TO FOLLOW, IF APPROVED PLEASE CALL IN SHORT TERM REFILL (5 days) to LOCAL [facility].

## 2025-05-11 DIAGNOSIS — I10 ESSENTIAL HYPERTENSION: ICD-10-CM

## 2025-05-13 RX ORDER — CALCIUM CITRATE/VITAMIN D3 200MG-6.25
1 TABLET ORAL DAILY
Qty: 100 STRIP | Refills: 3 | Status: SHIPPED | OUTPATIENT
Start: 2025-05-13

## 2025-05-13 RX ORDER — METOPROLOL SUCCINATE 25 MG/1
25 TABLET, EXTENDED RELEASE ORAL DAILY
Qty: 90 TABLET | Refills: 3 | Status: SHIPPED | OUTPATIENT
Start: 2025-05-13

## 2025-05-13 NOTE — TELEPHONE ENCOUNTER
Refill Per Protocol     Requested Prescriptions   Pending Prescriptions Disp Refills    TRUE METRIX BLOOD GLUCOSE TEST In Vitro Strip [Pharmacy Med Name: TRUE METRIX SELF MONITORI] 100 strip 3     Sig: TEST BLOOD SUGAR EVERY DAY       Diabetic Supplies Protocol Passed - 5/13/2025 10:35 AM        Passed - In person appointment or virtual visit in the past 12 mos or appointment in next 3 mos     Recent Outpatient Visits              1 month ago Medicare annual wellness visit, [facility]    [facility] Visit    2 months ago Diarrhea, unspecified type    [facility], Celi Juan APRN    Office Visit    2 months ago Primary hypertension    [facility], Shawn Macias MD    Office Visit    3 months ago Carpal tunnel syndrome of left wrist    [facility]    Office Visit    3 months ago Left hand pain    [facility]    [facility] Visit          Future Appointments         Provider Department Appt Notes    In 2 months [facility]    In 2 months ALEXA, PROCEDURE [facility]                    Passed - Medication is active on med list

## 2025-05-27 RX ORDER — GLIPIZIDE 2.5 MG/1
2.5 TABLET, EXTENDED RELEASE ORAL
Qty: 90 TABLET | Refills: 3 | Status: SHIPPED | OUTPATIENT
Start: 2025-05-27

## 2025-05-27 NOTE — TELEPHONE ENCOUNTER
Written for Quantity: 30 with 0 refills   Is refill appropriate   Medication refill pended for your review/approval

## 2025-06-03 ENCOUNTER — OFFICE VISIT (OUTPATIENT)
Dept: PHYSICAL MEDICINE AND REHAB | Facility: CLINIC | Age: 75
End: 2025-06-03
Payer: MEDICARE

## 2025-06-03 VITALS — WEIGHT: 153 LBS | BODY MASS INDEX: 27 KG/M2

## 2025-06-03 DIAGNOSIS — E11.40 TYPE 2 DIABETES MELLITUS WITH DIABETIC NEUROPATHY, WITHOUT LONG-TERM CURRENT USE OF INSULIN (HCC): ICD-10-CM

## 2025-06-03 DIAGNOSIS — M17.11 PRIMARY OSTEOARTHRITIS OF RIGHT KNEE: Primary | ICD-10-CM

## 2025-06-03 DIAGNOSIS — I69.351 SPASTIC HEMIPLEGIA OF RIGHT DOMINANT SIDE AS LATE EFFECT OF CEREBRAL INFARCTION (HCC): ICD-10-CM

## 2025-06-03 PROCEDURE — 99213 OFFICE O/P EST LOW 20 MIN: CPT | Performed by: PHYSICAL MEDICINE & REHABILITATION

## 2025-06-03 PROCEDURE — 20610 DRAIN/INJ JOINT/BURSA W/O US: CPT | Performed by: PHYSICAL MEDICINE & REHABILITATION

## 2025-06-03 PROCEDURE — 1159F MED LIST DOCD IN RCRD: CPT | Performed by: PHYSICAL MEDICINE & REHABILITATION

## 2025-06-03 PROCEDURE — 1125F AMNT PAIN NOTED PAIN PRSNT: CPT | Performed by: PHYSICAL MEDICINE & REHABILITATION

## 2025-06-03 RX ORDER — GABAPENTIN 100 MG/1
200 CAPSULE ORAL NIGHTLY
Qty: 180 CAPSULE | Refills: 1 | Status: SHIPPED | OUTPATIENT
Start: 2025-06-03

## 2025-06-03 RX ORDER — TRIAMCINOLONE ACETONIDE 40 MG/ML
80 INJECTION, SUSPENSION INTRA-ARTICULAR; INTRAMUSCULAR ONCE
Status: COMPLETED | OUTPATIENT
Start: 2025-06-03 | End: 2025-06-03

## 2025-06-03 RX ORDER — LIDOCAINE HYDROCHLORIDE 10 MG/ML
4 INJECTION, SOLUTION INFILTRATION; PERINEURAL ONCE
Status: COMPLETED | OUTPATIENT
Start: 2025-06-03 | End: 2025-06-03

## 2025-06-03 NOTE — PROGRESS NOTES
[facility]  Progress Note    CHIEF COMPLAINT:    Chief Complaint   Patient presents with    Follow - Up     LOV 1/21/25 L wrist carpal tunnel injection- 100% relief. Patient c/o R knee pain returning and requesting another injection. CPL: 6/10. Taking tylenol, gabapentin, lido patch with minimal relief to knee.        History of Present Illness:  Radha August is a 74 year old female who presents today for follow up for symptoms of chronic right knee osteoarthritis.  Over the past 24 months she has had a knee injection in October 2024 and a carpal tunnel injection in January 2025.  She is requesting a repeat knee injection.  She has severe osteoarthritis and knee hyperextension due to stroke.    PAST MEDICAL HISTORY:  Past Medical History[1]    SURGICAL HISTORY:  Past Surgical History[2]    SOCIAL HISTORY:   Social History     Occupational History    Not on file   Tobacco Use    Smoking status: Never    Smokeless tobacco: Never   Vaping Use    Vaping status: Never Used   Substance and Sexual Activity    Alcohol use: No     Comment: wine occasionally    Drug use: No    Sexual activity: Not on file       CURRENT MEDICATIONS:   Current Medications[3]    ALLERGIES:   Allergies[4]        PHYSICAL EXAM:   Wt 153 lb (69.4 kg)   BMI 27.10 kg/m²     Body mass index is 27.1 kg/m².      General: No immediate distress  Extremities: No lower extremity edema bilaterally   Knees: No effusion, painful range of motion  Neuro:   Cognition: alert & oriented x 3, attentive, able to follow 2 step commands, comprehention intact, spontaneous speech intact  Strength: Chronic right hemiparesis        ASSESSMENT AND PLAN:  1. Primary osteoarthritis of right knee  We decided to reinject the right knee.  She is not a candidate for knee replacement.  She seems to do well, greater than 6 months of relief from the last injection.  - triamcinolone acetonide (Kenalog-40) 40 MG/ML injection 80  mg  - lidocaine (Xylocaine) 1 % injection    2. Type 2 diabetes mellitus with diabetic neuropathy, without long-term current use of insulin (HCC)  Caution with blood sugars, she will monitor    3. Spastic hemiplegia of right dominant side as late effect of cerebral infarction (HCC)  Stable        RTC as needed      The patient was in agreement with the assessment and plan.  All questions were answered.        Kevin Ortez MD  Physical Medicine and Rehabilitation/Sports Medicine  [facility]           [1]   Past Medical History:   Ametropia    Hyperopia    Diabetes ([facility])    Lipid screening    Numbness and tingling of leg    Other and unspecified hyperlipidemia    Stroke ([facility])    Type II or unspecified type diabetes mellitus without mention of complication, not stated as uncontrolled    Unspecified essential hypertension   [2]   Past Surgical History:  Procedure Laterality Date    Colonoscopy      5 years ago date unknown    Cyst aspiration right Right     Hysterectomy      B[place]on localization wire 1 site right (cpt=19281)      pt states 20 yrs+ she had a benign cyst removed from her rt breast.     Oophorectomy     [3]   Current Outpatient Medications   Medication Sig Dispense Refill    gabapentin 100 MG Oral Cap Take 2 capsules (200 mg total) by mouth at bedtime. 180 capsule 1    clopidogrel 75 MG Oral Tab Take 1 tablet (75 mg total) by mouth daily. 90 tablet 3    lidocaine 5 % External Patch Place 1 patch onto the skin daily. 90 patch 0    aspirin 81 MG Oral Tab EC TAKE 1 TABLET BY MOUTH EVERY DAY 30 tablet 2    glipiZIDE ER 2.5 MG Oral Tablet 24 Hr Take 1 tablet (2.5 mg total) by mouth daily with breakfast. 90 tablet 3    metoprolol succinate ER 25 MG Oral Tablet 24 Hr Take 1 tablet (25 mg total) by mouth daily. 90 tablet 3    Glucose Blood (TRUE METRIX BLOOD GLUCOSE TEST) In Vitro Strip 1 strip by In Vitro route daily. 100 strip 3    amLODIPine 5 MG Oral Tab Take 1 tablet (5 mg total) by mouth  daily. 90 tablet 3    glipiZIDE ER 2.5 MG Oral Tablet 24 Hr Take 1 tablet (2.5 mg total) by mouth daily with breakfast. 90 tablet 3    amLODIPine 5 MG Oral Tab Take 1 tablet (5 mg total) by mouth daily. 30 tablet 0    colestipol 1 g Oral Tab Take 2 tablets (2 g total) by mouth at bedtime. 60 tablet 2    metFORMIN 500 MG Oral Tab Take 1 tablet (500 mg total) by mouth 2 (two) times daily with meals. 180 tablet 3    Alcohol Swabs (DROPSAFE ALCOHOL PREP) 70 % Does not apply Pads Apply 1 Pad topically daily. Prior to each finger stick 100 each 3    atorvastatin 20 MG Oral Tab Take 1 tablet (20 mg total) by mouth daily. 90 tablet 3    lisinopril-hydroCHLOROthiazide 20-25 MG Oral Tab Take 1 tablet by mouth daily. 90 tablet 3    ketoconazole 2 % External Shampoo Apply 1 Application topically twice a week. 100 mL 0    Acetaminophen-Codeine (TYLENOL WITH CODEINE #3) 300-30 MG Oral Tab Take 1 tablet by mouth every 12 (twelve) hours as needed for Pain. 30 tablet 0    Diclofenac Sodium 1 % External Gel Apply 2 g topically 4 (four) times daily. (Patient not taking: Reported on 6/3/2025) 1 each 0    Pyridoxine HCl (VITAMIN B-6 OR) Take by mouth.      CALCIUM OR Take by mouth.      Cholecalciferol (VITAMIN D-3 OR) Take by mouth.      melatonin 3 MG Oral Tab Take one tab at night 30 min before going to bed 30 tablet 0   [4] No Known Allergies

## 2025-06-03 NOTE — TELEPHONE ENCOUNTER
Refill passes per [facility] protocol.    Future Appointments   Date Time Provider Department Center   7/26/2025 11:30 AM Shawn Mathias MD [id number] [facility] [place] [address]       Recent Visits  Date Type Provider Dept   04/05/25 Office Visit Shawn Mathias MD [id number] Med   03/01/25 Office Visit Shawn Mathias MD [id number] Med

## 2025-06-11 ENCOUNTER — TELEPHONE (OUTPATIENT)
Facility: CLINIC | Age: 75
End: 2025-06-11

## 2025-06-11 DIAGNOSIS — R19.7 DIARRHEA, UNSPECIFIED TYPE: Primary | ICD-10-CM

## 2025-06-11 RX ORDER — COLESTIPOL HYDROCHLORIDE 1 G/1
2 TABLET ORAL NIGHTLY
Qty: 60 TABLET | Refills: 2 | Status: SHIPPED | OUTPATIENT
Start: 2025-06-11 | End: 2025-09-09

## 2025-06-11 NOTE — TELEPHONE ENCOUNTER
Ernesto Alatorre    Called and spoke to the patient, date of birth and name verified.    As per the patient, colestipol is helping and she would like to continue.    She is requesting refill.    Thank you       [FreeTextEntry1] : 49 year-old gentleman presents to the office with c/o of burning and pain on the tip of the penis. Patient noticed this 2 weeks ago. Patient applied Vasoline. Does report a little bit of redness. \par \par The patient states that since he used the betamethasone cream, his symptoms and balanitis have completely resolved.  He is very happy with the outcome is just here for follow-up.\par \par

## 2025-07-18 ENCOUNTER — TELEPHONE (OUTPATIENT)
Facility: CLINIC | Age: 75
End: 2025-07-18

## 2025-07-18 NOTE — TELEPHONE ENCOUNTER
Left VM for PT to call back if they have any changes to meds or insurance, and if they have any questions.

## 2025-07-23 ENCOUNTER — OFFICE VISIT (OUTPATIENT)
Dept: PHYSICAL MEDICINE AND REHAB | Facility: CLINIC | Age: 75
End: 2025-07-23
Payer: MEDICARE

## 2025-07-23 DIAGNOSIS — G56.02 CARPAL TUNNEL SYNDROME OF LEFT WRIST: Primary | ICD-10-CM

## 2025-07-23 DIAGNOSIS — I69.351 SPASTIC HEMIPLEGIA OF RIGHT DOMINANT SIDE AS LATE EFFECT OF CEREBRAL INFARCTION (HCC): ICD-10-CM

## 2025-07-23 PROCEDURE — 20526 THER INJECTION CARP TUNNEL: CPT | Performed by: PHYSICAL MEDICINE & REHABILITATION

## 2025-07-23 PROCEDURE — 99213 OFFICE O/P EST LOW 20 MIN: CPT | Performed by: PHYSICAL MEDICINE & REHABILITATION

## 2025-07-23 RX ORDER — LIDOCAINE HYDROCHLORIDE 10 MG/ML
3 INJECTION, SOLUTION INFILTRATION; PERINEURAL ONCE
Status: COMPLETED | OUTPATIENT
Start: 2025-07-23 | End: 2025-07-23

## 2025-07-23 RX ORDER — TRIAMCINOLONE ACETONIDE 40 MG/ML
40 INJECTION, SUSPENSION INTRA-ARTICULAR; INTRAMUSCULAR ONCE
Status: COMPLETED | OUTPATIENT
Start: 2025-07-23 | End: 2025-07-23

## 2025-07-23 NOTE — PROGRESS NOTES
[facility]  Progress Note    CHIEF COMPLAINT:    Chief Complaint   Patient presents with    Follow - Up     LOV 06/03/25 Patient is here to follow up on on left hand pain. Patient states the pain is an ache that is constant, Pain 1/10. Admits N/T. Admits weakness. Denies HEP. Admits Tylenol taken PRN.        History of Present Illness:  Radha August is a 75 year old female who presents today for follow up for symptoms of left carpal tunnel syndrome.  She has had this for years.  I last did an injection in January.  She uses a cane in her left hand as she has right hemiparesis.    PAST MEDICAL HISTORY:  Past Medical History[1]    SURGICAL HISTORY:  Past Surgical History[2]    SOCIAL HISTORY:   Social History     Occupational History    Not on file   Tobacco Use    Smoking status: Never    Smokeless tobacco: Never   Vaping Use    Vaping status: Never Used   Substance and Sexual Activity    Alcohol use: Yes     Comment: wine occasionally    Drug use: No    Sexual activity: Not on file       CURRENT MEDICATIONS:   Current Medications[3]    ALLERGIES:   Allergies[4]      PHYSICAL EXAM:   There were no vitals taken for this visit.    There is no height or weight on file to calculate BMI.      Unchanged        ASSESSMENT AND PLAN:  1. Carpal tunnel syndrome of left wrist  We decided to inject the wrist again.  She knows her home exercises.  If no better consider surgical intervention  - triamcinolone acetonide (Kenalog-40) 40 MG/ML injection 40 mg  - lidocaine (Xylocaine) 1 % injection    2. Spastic hemiplegia of right dominant side as late effect of cerebral infarction (HCC)  Causing reliance on left hand for cane use        RTC as needed        The patient was in agreement with the assessment and plan.  All questions were answered.        Kevin Ortez MD  Physical Medicine and Rehabilitation/Sports Medicine  [facility]             [1]   Past  Medical History:   Ametropia    Hyperopia    Diabetes (HCC)    High blood pressure    High cholesterol    Lipid screening    Numbness and tingling of leg    Osteoarthritis    Other and unspecified hyperlipidemia    Stroke (HCC)    no residual    Type II or unspecified type diabetes mellitus without mention of complication, not stated as uncontrolled    Unspecified essential hypertension    Visual impairment    glasses   [2]   Past Surgical History:  Procedure Laterality Date    Colonoscopy      5 years ago date unknown    Cyst aspiration right Right     Hysterectomy      Brendon localization wire 1 site right (cpt=19281)      pt states 20 yrs+ she had a benign cyst removed from her rt breast.     Oophorectomy     [3]   Current Outpatient Medications   Medication Sig Dispense Refill    colestipol 1 g Oral Tab Take 2 tablets (2 g total) by mouth at bedtime. 60 tablet 2    gabapentin 100 MG Oral Cap Take 2 capsules (200 mg total) by mouth at bedtime. 180 capsule 1    glipiZIDE ER 2.5 MG Oral Tablet 24 Hr Take 1 tablet (2.5 mg total) by mouth daily with breakfast. 90 tablet 3    metoprolol succinate ER 25 MG Oral Tablet 24 Hr Take 1 tablet (25 mg total) by mouth daily. 90 tablet 3    Glucose Blood (TRUE METRIX BLOOD GLUCOSE TEST) In Vitro Strip 1 strip by In Vitro route daily. 100 strip 3    amLODIPine 5 MG Oral Tab Take 1 tablet (5 mg total) by mouth daily. 90 tablet 3    metFORMIN 500 MG Oral Tab Take 1 tablet (500 mg total) by mouth 2 (two) times daily with meals. 180 tablet 3    Alcohol Swabs (DROPSAFE ALCOHOL PREP) 70 % Does not apply Pads Apply 1 Pad topically daily. Prior to each finger stick 100 each 3    atorvastatin 20 MG Oral Tab Take 1 tablet (20 mg total) by mouth daily. 90 tablet 3    clopidogrel 75 MG Oral Tab Take 1 tablet (75 mg total) by mouth daily. 90 tablet 3    lisinopril-hydroCHLOROthiazide 20-25 MG Oral Tab Take 1 tablet by mouth daily. 90 tablet 3    ketoconazole 2 % External Shampoo Apply 1  Application topically twice a week. 100 mL 0    lidocaine 5 % External Patch Place 1 patch onto the skin daily. (Patient taking differently: Place 1 patch onto the skin daily as needed.) 90 patch 0    Acetaminophen-Codeine (TYLENOL WITH CODEINE #3) 300-30 MG Oral Tab Take 1 tablet by mouth every 12 (twelve) hours as needed for Pain. 30 tablet 0    CALCIUM OR Take 1 tablet by mouth daily.      Cholecalciferol (VITAMIN D-3 OR) Take 1 tablet by mouth daily.      aspirin 81 MG Oral Tab EC TAKE 1 TABLET BY MOUTH EVERY DAY 30 tablet 2   [4] No Known Allergies

## 2025-07-25 ENCOUNTER — TELEPHONE (OUTPATIENT)
Facility: CLINIC | Age: 75
End: 2025-07-25

## 2025-07-25 DIAGNOSIS — E78.5 HYPERLIPIDEMIA, UNSPECIFIED HYPERLIPIDEMIA TYPE: ICD-10-CM

## 2025-07-25 RX ORDER — ATORVASTATIN CALCIUM 20 MG/1
20 TABLET, FILM COATED ORAL DAILY
Qty: 90 TABLET | Refills: 3 | Status: SHIPPED | OUTPATIENT
Start: 2025-07-25

## 2025-07-25 RX ORDER — LISINOPRIL AND HYDROCHLOROTHIAZIDE 20; 25 MG/1; MG/1
1 TABLET ORAL DAILY
Qty: 90 TABLET | Refills: 3 | Status: SHIPPED | OUTPATIENT
Start: 2025-07-25

## 2025-07-25 RX ORDER — CLOPIDOGREL BISULFATE 75 MG/1
75 TABLET ORAL DAILY
Qty: 90 TABLET | Refills: 3 | Status: SHIPPED | OUTPATIENT
Start: 2025-07-25

## 2025-07-25 NOTE — TELEPHONE ENCOUNTER
miralax/Gatorade prep ordered for procedure on 7/28/2025    I spoke to the patient    I informed patient that the miralax/Gatorade prep needs to be purchased by the patient over the counter. She is aware she will need to purchase a 238 g bottle of miralax/64 oz of Gatorade, dulcolax laxative tablets    I answered all the patient's questions in detail    RN sent bowel prep instructions through [employer] for reference    Patient verbalized understanding and has no further questions at this time

## 2025-07-25 NOTE — TELEPHONE ENCOUNTER
Per patient calling stating \"she has not yet received her bowel preparation\" in regards to her colonoscopy on 7/28/2025. Per patient requesting the bowel preparation be sent to the [facility] in [place]. Please advise.

## 2025-07-26 ENCOUNTER — OFFICE VISIT (OUTPATIENT)
Dept: INTERNAL MEDICINE CLINIC | Facility: CLINIC | Age: 75
End: 2025-07-26

## 2025-07-26 VITALS
WEIGHT: 153.81 LBS | HEART RATE: 70 BPM | TEMPERATURE: 98 F | SYSTOLIC BLOOD PRESSURE: 124 MMHG | RESPIRATION RATE: 16 BRPM | BODY MASS INDEX: 27 KG/M2 | DIASTOLIC BLOOD PRESSURE: 82 MMHG

## 2025-07-26 DIAGNOSIS — I10 ESSENTIAL HYPERTENSION: Primary | ICD-10-CM

## 2025-07-26 DIAGNOSIS — E78.5 HYPERLIPIDEMIA, UNSPECIFIED HYPERLIPIDEMIA TYPE: ICD-10-CM

## 2025-07-26 DIAGNOSIS — E11.40 TYPE 2 DIABETES MELLITUS WITH DIABETIC NEUROPATHY, WITHOUT LONG-TERM CURRENT USE OF INSULIN (HCC): ICD-10-CM

## 2025-07-26 DIAGNOSIS — M17.11 PRIMARY OSTEOARTHRITIS OF RIGHT KNEE: ICD-10-CM

## 2025-07-26 PROCEDURE — 99214 OFFICE O/P EST MOD 30 MIN: CPT | Performed by: INTERNAL MEDICINE

## 2025-07-26 PROCEDURE — 1159F MED LIST DOCD IN RCRD: CPT | Performed by: INTERNAL MEDICINE

## 2025-07-26 PROCEDURE — 3079F DIAST BP 80-89 MM HG: CPT | Performed by: INTERNAL MEDICINE

## 2025-07-26 PROCEDURE — 3074F SYST BP LT 130 MM HG: CPT | Performed by: INTERNAL MEDICINE

## 2025-07-26 PROCEDURE — 3051F HG A1C>EQUAL 7.0%<8.0%: CPT | Performed by: INTERNAL MEDICINE

## 2025-07-26 PROCEDURE — 1160F RVW MEDS BY RX/DR IN RCRD: CPT | Performed by: INTERNAL MEDICINE

## 2025-07-26 PROCEDURE — 3061F NEG MICROALBUMINURIA REV: CPT | Performed by: INTERNAL MEDICINE

## 2025-07-26 PROCEDURE — 1126F AMNT PAIN NOTED NONE PRSNT: CPT | Performed by: INTERNAL MEDICINE

## 2025-07-26 NOTE — TELEPHONE ENCOUNTER
Please review;  [facility] protocol failed/ No protocol     Requested Prescriptions   Pending Prescriptions Disp Refills    clopidogrel 75 MG Oral Tab [Pharmacy Med Name: CLOPIDOGREL 75 MG Oral Tablet] 90 tablet 3     Sig: Take 1 tablet (75 mg total) by mouth daily.       There is no refill protocol information for this order      Signed Prescriptions Disp Refills    lisinopril-hydroCHLOROthiazide 20-25 MG Oral Tab 90 tablet 3     Sig: Take 1 tablet by mouth daily.       Hypertension Medications Protocol Passed - 7/25/2025  9:44 PM        Passed - CMP or BMP in past 12 months        Passed - Last BP reading less than 140/90     BP Readings from Last 1 Encounters:   04/05/25 138/84               Passed - In person appointment or virtual visit in the past 12 mos or appointment in next 3 mos     Recent Outpatient Visits              2 [facility]    [facility]    1 month ago Primary osteoarthritis of right knee    [facility]    [facility]    3 months ago Medicare annual wellness visit, subsequent    [facility]    [facility]    4 months ago Diarrhea, unspecified type    [facility]    [facility] Visit    4 months ago Primary hypertension    [facility]          Future Appointments         Provider Department Appt Notes    Tom[facility]    In 3 days [facility]XA, PROCEDURE [facility]                    Passed - EGFRCR or GFRAA > 50     GFR Evaluation  EGFRCR: 56 , resulted on 4/5/2025          Passed -  Medication is active on med list          atorvastatin 20 MG Oral Tab 90 tablet 3     Sig: Take 1 tablet (20 mg total) by mouth daily.       Cholesterol Medication Protocol Passed - 7/25/2025  9:44 PM        Passed - ALT < 80     Lab Results   Component Value Date    ALT 16 04/05/2025             Passed - ALT resulted within past year        Passed - Lipid panel within past 12 months     Lab Results   Component Value Date    CHOLEST 109 04/05/2025    TRIG 47 04/05/2025    HDL 55 04/05/2025    LDL 42 04/05/2025    VLDL 7 04/05/2025    NONHDLC 54 04/05/2025             Passed - In person appointment or virtual visit in the past 12 mos or appointment in next 3 mos     Recent Outpatient Visits              2 [facility]    1 month ago Primary osteoarthritis of right knee    [facility]    [facility]    3 months ago Medicare annual wellness visit, subsequent    [facility]    [facility] Visit    4 months ago Diarrhea, unspecified type    [facility], Celi Juan APRN    Office Visit    4 months ago Primary hypertension    [facility]          Future Appointments         Provider Department Appt Notes    [facility]    In 3 days [facility]                    Passed - Medication is active on med list           Future Appointments         Provider Department Appt Notes    Tomorrow [facility]    In 3 days ALEXA, PROCEDURE [facility] [place] @ [facility]          Recent Outpatient Visits              2 days ago Carpal tunnel syndrome of left wrist    [facility], Kevin Suárez MD    Office Visit    1 month ago Primary osteoarthritis of right knee    [facility], Kevin Suárez MD    Office Visit    3 months ago Medicare annual wellness visit, subsequent    [facility], Shawn Macias MD    Office Visit    4 months ago Diarrhea, unspecified type    [facility], Celi Juan APRN    Office Visit    4 months ago Primary hypertension    [facility], Shawn Macias MD    Office Visit

## 2025-07-26 NOTE — PROGRESS NOTES
Subjective:     Patient ID: Radha August is a 75 year old female.    Patient comes in for follow-up overall doing okay except continues to have right knee pain she has had injections done with physiatry but do not help too much patient wants to see a specialist for possible surgery  Otherwise doing okay denies any other complaints recent lab noted with A1c gone up just little bit she is watching diet taking medications she is doing colonoscopy on Monday        History/Other:   Review of Systems   Constitutional: Negative.    HENT: Negative.     Eyes: Negative.    Respiratory: Negative.     Cardiovascular: Negative.    Gastrointestinal: Negative.    Genitourinary: Negative.    Musculoskeletal:  Positive for arthralgias.        Rt knee pain     Skin: Negative.    Neurological: Negative.    Psychiatric/Behavioral: Negative.       Current Medications[1]  Allergies:Allergies[2]    Past Medical History[3]   Past Surgical History[4]   Family History[5]   Social History: Short Social Hx on File[6]     Objective:   Physical Exam  Vitals and nursing note reviewed.   Constitutional:       Appearance: She is well-developed.   HENT:      Head: Normocephalic and atraumatic.      Right Ear: External ear normal.      Left Ear: External ear normal.      Nose: Nose normal.   Eyes:      Conjunctiva/sclera: Conjunctivae normal.      Pupils: Pupils are equal, round, and reactive to light.   Cardiovascular:      Rate and Rhythm: Normal rate and regular rhythm.      Heart sounds: Normal heart sounds.   Pulmonary:      Effort: Pulmonary effort is normal.      Breath sounds: Normal breath sounds.   Abdominal:      General: Bowel sounds are normal.      Palpations: Abdomen is soft.   Genitourinary:     Vagina: Normal.   Musculoskeletal:         General: Tenderness present. Normal range of motion.      Cervical back: Normal range of motion and neck supple.      Comments: Right knee pain   Skin:     General: Skin is warm and dry.    Neurological:      Mental Status: She is alert and oriented to person, place, and time.      Deep Tendon Reflexes: Reflexes are normal and symmetric.   Psychiatric:         Behavior: Behavior normal.         Thought Content: Thought content normal.         Judgment: Judgment normal.         Assessment & Plan:   1. Essential hypertension continue current treatment watch diet take medication   2. Type 2 diabetes mellitus with diabetic neuropathy, without long-term current use of insulin (HCC) will retest with starting medication   3. Primary osteoarthritis of right knee seen physiatry has had shots multiple shots not helping too much will refer to Ortho   4. Hyperlipidemia, unspecified hyperlipidemia type continue current treatment watch diet take medication       Orders Placed This Encounter   Procedures    Hemoglobin A1C       Meds This Visit:  Requested Prescriptions      No prescriptions requested or ordered in this encounter       Imaging & Referrals:  ORTHOPEDIC - INTERNAL            [1]   Current Outpatient Medications   Medication Sig Dispense Refill    clopidogrel 75 MG Oral Tab Take 1 tablet (75 mg total) by mouth daily. 90 tablet 3    lisinopril-hydroCHLOROthiazide 20-25 MG Oral Tab Take 1 tablet by mouth daily. 90 tablet 3    atorvastatin 20 MG Oral Tab Take 1 tablet (20 mg total) by mouth daily. 90 tablet 3    colestipol 1 g Oral Tab Take 2 tablets (2 g total) by mouth at bedtime. 60 tablet 2    gabapentin 100 MG Oral Cap Take 2 capsules (200 mg total) by mouth at bedtime. 180 capsule 1    glipiZIDE ER 2.5 MG Oral Tablet 24 Hr Take 1 tablet (2.5 mg total) by mouth daily with breakfast. 90 tablet 3    metoprolol succinate ER 25 MG Oral Tablet 24 Hr Take 1 tablet (25 mg total) by mouth daily. 90 tablet 3    Glucose Blood (TRUE METRIX BLOOD GLUCOSE TEST) In Vitro Strip 1 strip by In Vitro route daily. 100 strip 3    amLODIPine 5 MG Oral Tab Take 1 tablet (5 mg total) by mouth daily. 90 tablet 3     metFORMIN 500 MG Oral Tab Take 1 tablet (500 mg total) by mouth 2 (two) times daily with meals. 180 tablet 3    Alcohol Swabs (DROPSAFE ALCOHOL PREP) 70 % Does not apply Pads Apply 1 Pad topically daily. Prior to each finger stick 100 each 3    ketoconazole 2 % External Shampoo Apply 1 Application topically twice a week. 100 mL 0    lidocaine 5 % External Patch Place 1 patch onto the skin daily. (Patient taking differently: Place 1 patch onto the skin daily as needed.) 90 patch 0    Acetaminophen-Codeine (TYLENOL WITH CODEINE #3) 300-30 MG Oral Tab Take 1 tablet by mouth every 12 (twelve) hours as needed for Pain. 30 tablet 0    CALCIUM OR Take 1 tablet by mouth daily.      Cholecalciferol (VITAMIN D-3 OR) Take 1 tablet by mouth daily.      aspirin 81 MG Oral Tab EC TAKE 1 TABLET BY MOUTH EVERY DAY 30 tablet 2   [2] No Known Allergies  [3]   Past Medical History:   Ametropia    Hyperopia    Diabetes (HCC)    High blood pressure    High cholesterol    Lipid screening    Numbness and tingling of leg    Osteoarthritis    Other and unspecified hyperlipidemia    Stroke (HCC)    no residual    Type II or unspecified type diabetes mellitus without mention of complication, not stated as uncontrolled    Unspecified essential hypertension    Visual impairment    glasses   [4]   Past Surgical History:  Procedure Laterality Date    Colonoscopy      5 years ago date unknown    Cyst aspiration right Right     Hysterectomy      Brendon localization wire 1 site right (cpt=19281)      pt states 20 yrs+ she had a benign cyst removed from her rt breast.     Oophorectomy     [5]   Family History  Problem Relation Age of Onset    Heart Disorder Mother         congestive heart failure    Diabetes Other         Daughter has DM    Breast Cancer Other         maternal cousin    Macular degeneration Neg     Glaucoma Neg    [6]   Social History  Socioeconomic History    Marital status: Single   Tobacco Use    Smoking status: Never    Smokeless  tobacco: Never   Vaping Use    Vaping status: Never Used   Substance and Sexual Activity    Alcohol use: Yes     Comment: wine occasionally    Drug use: No   Other Topics Concern    Caffeine Concern No     Social Drivers of Health      [facility]

## 2025-07-26 NOTE — TELEPHONE ENCOUNTER
Refill passed per [facility] protocol.    Requested Prescriptions   Pending Prescriptions Disp Refills    CLOPIDOGREL 75 MG Oral Tab [Pharmacy Med Name: CLOPIDOGREL 75 MG Oral Tablet] 90 tablet 3     Sig: TAKE 1 TABLET EVERY DAY       There is no refill protocol information for this order       LISINOPRIL-HYDROCHLOROTHIAZIDE 20-25 MG Oral Tab [Pharmacy Med Name: LISINOPRIL/HYDROCHLOROTHIAZIDE 20-25 MG Oral Tablet] 90 tablet 3     Sig: TAKE 1 TABLET EVERY DAY       Hypertension Medications Protocol Passed - 7/25/2025  9:44 PM        Passed - CMP or BMP in past 12 months        Passed - Last BP reading less than 140/90     BP Readings from Last 1 Encounters:   04/05/25 138/84               Passed - In person appointment or virtual visit in the past 12 mos or appointment in next 3 mos     Recent Outpatient Visits              2 days ago [facility]    [facility]    1 month ago Primary osteoarthritis of right knee    [facility]    [facility]    3 months ago Medicare annual wellness visit, subsequent    [facility]    [facility]    4 months ago Diarrhea, unspecified type    [facility]    Office Visit    4 months ago Primary hypertension    [facility]    [facility]          Future Appointments         Provider Department Appt Notes    [facility]    In 3 days [facility]                    Passed - EGFRCR or GFRAA > 50     GFR Evaluation  EGFRCR: 56 , resulted on 4/5/2025          Passed - Medication is  active on med list          ATORVASTATIN 20 MG Oral Tab [Pharmacy Med Name: ATORVASTATIN CALCIUM 20 MG Oral Tablet] 90 tablet 3     Sig: TAKE 1 TABLET EVERY DAY       Cholesterol Medication Protocol Passed - 7/25/2025  9:44 PM        Passed - ALT < 80     Lab Results   Component Value Date    ALT 16 04/05/2025             Passed - ALT resulted within past year        Passed - Lipid panel within past 12 months     Lab Results   Component Value Date    CHOLEST 109 04/05/2025    TRIG 47 04/05/2025    HDL 55 04/05/2025    LDL 42 04/05/2025    VLDL 7 04/05/2025    NONHDLC 54 04/05/2025             Passed - In person appointment or virtual visit in the past 12 mos or appointment in next 3 mos     Recent Outpatient Visits              2 days ago [facility] syndrome [facility] left wrist    [facility]    [facility]    1 month ago Primary osteoarthritis of right knee    [facility]    [facility] Visit    3 months ago Medicare annual wellness visit, subsequent    [facility]    [facility] Visit    4 months ago Diarrhea, unspecified type    [facility], Celi Juan APRN    Office Visit    4 months ago Primary hypertension    [facility]    [facility]          Future Appointments         Provider Department Appt Notes    [facility]    In 3 days [facility]                    Passed - Medication is active on med list           Future Appointments         Provider Department Appt Notes    [facility]    In 3 days ALEXA,  PROCEDURE [facility] [place] Colon MAC @ [facility]          Recent Outpatient Visits              2 days ago Carpal tunnel syndrome of left wrist    [facility], Kevin Suárez MD    Office Visit    1 month ago Primary osteoarthritis of right knee    [facility], Kevin Suráez MD    Office Visit    3 months ago Medicare annual wellness visit, subsequent    [facility], Shawn Macias MD    Office Visit    4 months ago Diarrhea, unspecified type    [facility], Celi Juan APRN    Office Visit    4 months ago Primary hypertension    [facility], Shawn Macias MD    Office Visit

## 2025-07-28 ENCOUNTER — ANESTHESIA EVENT (OUTPATIENT)
Dept: ENDOSCOPY | Facility: HOSPITAL | Age: 75
End: 2025-07-28
Payer: MEDICARE

## 2025-07-28 ENCOUNTER — HOSPITAL ENCOUNTER (OUTPATIENT)
Facility: HOSPITAL | Age: 75
Setting detail: HOSPITAL OUTPATIENT SURGERY
Discharge: HOME | End: 2025-07-28
Attending: INTERNAL MEDICINE | Admitting: INTERNAL MEDICINE

## 2025-07-28 ENCOUNTER — ANESTHESIA (OUTPATIENT)
Dept: ENDOSCOPY | Facility: HOSPITAL | Age: 75
End: 2025-07-28
Payer: MEDICARE

## 2025-07-28 DIAGNOSIS — Z86.0100 PERSONAL HISTORY OF COLON POLYPS, UNSPECIFIED: ICD-10-CM

## 2025-07-28 DIAGNOSIS — R19.7 DIARRHEA, UNSPECIFIED TYPE: ICD-10-CM

## 2025-07-28 DIAGNOSIS — Z12.11 COLON CANCER SCREENING: ICD-10-CM

## 2025-07-28 DIAGNOSIS — R15.2 FECAL URGENCY: ICD-10-CM

## 2025-07-28 PROBLEM — K57.30 DIVERTICULOSIS OF COLON: Status: ACTIVE | Noted: 2025-07-28

## 2025-07-28 PROBLEM — K63.5 COLORECTAL POLYPS: Status: ACTIVE | Noted: 2025-07-28

## 2025-07-28 PROBLEM — K64.9 HEMORRHOIDS: Status: ACTIVE | Noted: 2025-07-28

## 2025-07-28 PROBLEM — K62.1 COLORECTAL POLYPS: Status: ACTIVE | Noted: 2025-07-28

## 2025-07-28 LAB — GLUCOSE BLDC GLUCOMTR-MCNC: 154 MG/DL (ref 70–99)

## 2025-07-28 PROCEDURE — 45385 COLONOSCOPY W/LESION REMOVAL: CPT | Performed by: INTERNAL MEDICINE

## 2025-07-28 DEVICE — IMPLANTABLE DEVICE: Type: IMPLANTABLE DEVICE | Status: FUNCTIONAL

## 2025-07-28 RX ORDER — SODIUM CHLORIDE, SODIUM LACTATE, POTASSIUM CHLORIDE, CALCIUM CHLORIDE 600; 310; 30; 20 MG/100ML; MG/100ML; MG/100ML; MG/100ML
INJECTION, SOLUTION INTRAVENOUS CONTINUOUS
Status: DISCONTINUED | OUTPATIENT
Start: 2025-07-28 | End: 2025-07-28

## 2025-07-28 RX ORDER — NALOXONE HYDROCHLORIDE 0.4 MG/ML
0.08 INJECTION, SOLUTION INTRAMUSCULAR; INTRAVENOUS; SUBCUTANEOUS ONCE AS NEEDED
Status: DISCONTINUED | OUTPATIENT
Start: 2025-07-28 | End: 2025-07-28

## 2025-07-28 RX ORDER — LIDOCAINE HYDROCHLORIDE 10 MG/ML
INJECTION, SOLUTION EPIDURAL; INFILTRATION; INTRACAUDAL; PERINEURAL AS NEEDED
Status: DISCONTINUED | OUTPATIENT
Start: 2025-07-28 | End: 2025-07-28 | Stop reason: SURG

## 2025-07-28 RX ADMIN — LIDOCAINE HYDROCHLORIDE 40 MG: 10 INJECTION, SOLUTION EPIDURAL; INFILTRATION; INTRACAUDAL; PERINEURAL at 08:48:00

## 2025-07-28 NOTE — ANESTHESIA PREPROCEDURE EVALUATION
Anesthesia PreOp Note    HPI:     Radha August is a 75 year old female who presents for preoperative consultation requested by: Jose Ramon Hamilton MD    Date of Surgery: 7/28/2025    Procedure(s):  COLONOSCOPY  Indication: Diarrhea, unspecified type / Fecal urgency / Colon cancer screening / Personal history of colon polyps, unspecified    Relevant Problems   No relevant active problems       NPO:  Last Liquid Consumption Date: 07/28/25  Last Liquid Consumption Time: 0500  Last Solid Consumption Date: 07/26/25  Last Solid Consumption Time: 1300  Last Liquid Consumption Date: 07/28/25          History Review:  Patient Active Problem List    Diagnosis Date Noted    Carpal tunnel syndrome of left wrist 07/23/2025    History of cerebral infarction 03/27/2025    Primary osteoarthritis of both knees 08/26/2023    Mild anemia 08/26/2023    Spastic hemiplegia of right dominant side as late effect of cerebral infarction (HCC) 07/25/2023    Hemarthrosis of right knee 05/13/2023    Postmenopausal 05/13/2023    Primary osteoarthritis of right knee 11/19/2019    Diabetes mellitus type 2 without retinopathy (HCC) 04/24/2019    Floater, vitreous, bilateral 04/24/2019    Type 2 diabetes mellitus with diabetic neuropathy, without long-term current use of insulin (HCC) 04/20/2018    Carotid artery plaque, bilateral 04/20/2018    Chronic diastolic congestive heart failure, NYHA class 1 (HCC) 04/20/2018    Age-related nuclear cataract of both eyes 08/27/2015    Essential hypertension 05/31/2014    Hyperlipidemia 05/31/2014    Speech disturbance 01/28/2014    Abnormality of gait as late effect of cerebrovascular accident (CVA) 01/28/2014       Past Medical History[1]    Past Surgical History[2]    Prescriptions Prior to Admission[3]  Current Medications and Prescriptions Ordered in Epic[4]    Allergies[5]    Family History[6]  Social Hx on file[7]    Available pre-op labs reviewed.             Vital Signs:  Body mass index is 26.04  kg/m².   height is 1.6 m (5' 3\") and weight is 66.7 kg (147 lb). Her blood pressure is 131/84 and her pulse is 71. Her respiration is 14 and oxygen saturation is 100%.   Vitals:    07/21/25 1530 07/28/25 0749 07/28/25 0802   BP:   131/84   Pulse:   71   Resp:   14   SpO2:   100%   Weight: 66.7 kg (147 lb) 66.7 kg (147 lb)    Height: 1.6 m (5' 3\") 1.6 m (5' 3\")         Anesthesia Evaluation     Patient summary reviewed and Nursing notes reviewed    Airway   Mallampati: II  TM distance: <3 FB  Dental      Pulmonary     breath sounds clear to auscultation  Cardiovascular   Exercise tolerance: poor  (+) hypertension, CAD, CHF, weak pulses, peripheral edema    Rhythm: regular  Rate: normal  ROS comment: PVD    Neuro/Psych    (+)  neuromuscular disease, CVA,        GI/Hepatic/Renal      Endo/Other    (+) diabetes mellitus  Abdominal                  Anesthesia Plan:   ASA:  3  Plan:   MAC  Informed Consent Plan and Risks Discussed With:  Patient  Discussed plan with:  Attending      I have informed Radha August and/or legal guardian or family member of the nature of the anesthetic plan, benefits, risks including possible dental damage if relevant, major complications, and any alternative forms of anesthetic management.   All of the patient's questions were answered to the best of my ability. The patient desires the anesthetic management as planned.  Ludin Keating MD  7/28/2025 8:06 AM  Present on Admission:  **None**           [1]   Past Medical History:   Ametropia    Hyperopia    Diabetes (HCC)    High blood pressure    High cholesterol    Lipid screening    Numbness and tingling of leg    Osteoarthritis    Other and unspecified hyperlipidemia    Stroke (HCC)    no residual    Type II or unspecified type diabetes mellitus without mention of complication, not stated as uncontrolled    Unspecified essential hypertension    Visual impairment    glasses   [2]   Past Surgical History:  Procedure Laterality Date     Colonoscopy      5 years ago date unknown    Cyst aspiration right Right     Hysterectomy      Brendon localization wire 1 site right (cpt=19281)      pt states 20 yrs+ she had a benign cyst removed from her rt breast.     Oophorectomy     [3]   Facility-Administered Medications Prior to Admission   Medication Dose Route Frequency Provider Last Rate Last Admin    [COMPLETED] triamcinolone acetonide (Kenalog-40) 40 MG/ML injection 40 mg  40 mg Intramuscular Once Kevin Ortez MD   40 mg at 07/23/25 1052    [COMPLETED] lidocaine (Xylocaine) 1 % injection  3 mL Intradermal Once Kevin Ortez MD   3 mL at 07/23/25 1052    [COMPLETED] triamcinolone acetonide (Kenalog-40) 40 MG/ML injection 80 mg  80 mg Intra-articular Once Kevin Ortez MD   80 mg at 06/03/25 1112    [COMPLETED] lidocaine (Xylocaine) 1 % injection  4 mL Intra-articular Once Kevin Ortez MD   4 mL at 06/03/25 1111    triamcinolone acetonide (Kenalog-40) 40 MG/ML injection 40 mg  40 mg Intra-articular Once Sukhdeep Lujan MD         Medications Prior to Admission   Medication Sig Dispense Refill Last Dose/Taking    colestipol 1 g Oral Tab Take 2 tablets (2 g total) by mouth at bedtime. 60 tablet 2 7/26/2025    gabapentin 100 MG Oral Cap Take 2 capsules (200 mg total) by mouth at bedtime. 180 capsule 1 7/26/2025    glipiZIDE ER 2.5 MG Oral Tablet 24 Hr Take 1 tablet (2.5 mg total) by mouth daily with breakfast. 90 tablet 3 7/26/2025    metoprolol succinate ER 25 MG Oral Tablet 24 Hr Take 1 tablet (25 mg total) by mouth daily. 90 tablet 3 7/26/2025    amLODIPine 5 MG Oral Tab Take 1 tablet (5 mg total) by mouth daily. 90 tablet 3 7/27/2025    metFORMIN 500 MG Oral Tab Take 1 tablet (500 mg total) by mouth 2 (two) times daily with meals. 180 tablet 3 7/26/2025    ketoconazole 2 % External Shampoo Apply 1 Application topically twice a week. 100 mL 0 Taking    lidocaine 5 % External Patch Place 1 patch onto the skin daily. (Patient  taking differently: Place 1 patch onto the skin daily as needed.) 90 patch 0 Taking Differently    Acetaminophen-Codeine (TYLENOL WITH CODEINE #3) 300-30 MG Oral Tab Take 1 tablet by mouth every 12 (twelve) hours as needed for Pain. 30 tablet 0 7/20/2025    CALCIUM OR Take 1 tablet by mouth daily.   Taking    Cholecalciferol (VITAMIN D-3 OR) Take 1 tablet by mouth daily.   Taking    aspirin 81 MG Oral Tab EC TAKE 1 TABLET BY MOUTH EVERY DAY 30 tablet 2 7/27/2025    clopidogrel 75 MG Oral Tab Take 1 tablet (75 mg total) by mouth daily. 90 tablet 3 7/22/2025    lisinopril-hydroCHLOROthiazide 20-25 MG Oral Tab Take 1 tablet by mouth daily. 90 tablet 3 7/26/2025    atorvastatin 20 MG Oral Tab Take 1 tablet (20 mg total) by mouth daily. 90 tablet 3 7/27/2025    Glucose Blood (TRUE METRIX BLOOD GLUCOSE TEST) In Vitro Strip 1 strip by In Vitro route daily. 100 strip 3     Alcohol Swabs (DROPSAFE ALCOHOL PREP) 70 % Does not apply Pads Apply 1 Pad topically daily. Prior to each finger stick 100 each 3    [4]   Current Facility-Administered Medications Ordered in Epic   Medication Dose Route Frequency Provider Last Rate Last Admin    lactated ringers infusion   Intravenous Continuous Jose Ramon Hamilton MD         No current [facility]-ordered outpatient medications on file.   [5] No Known Allergies  [6]   Family History  Problem Relation Age of Onset    Heart Disorder Mother         congestive heart failure    Diabetes Other         Daughter has DM    Breast Cancer Other         maternal cousin    Macular degeneration Neg     Glaucoma Neg    [7]   Social History  Socioeconomic History    Marital status: Single   Tobacco Use    Smoking status: Never    Smokeless tobacco: Never   Vaping Use    Vaping status: Never Used   Substance and Sexual Activity    Alcohol use: Yes     Comment: wine occasionally    Drug use: No   Other Topics Concern    Caffeine Concern No

## 2025-07-28 NOTE — ANESTHESIA POSTPROCEDURE EVALUATION
Patient: Radha Sanchez August    Procedure Summary       Date: 07/28/25 Room / Location: [facility] ENDOSCOPY 04 / [facility] ENDOSCOPY    Anesthesia Start: 0845 Anesthesia Stop: 0921    Procedure: COLONOSCOPY Diagnosis:       Diarrhea, unspecified type      Fecal urgency      Colon cancer screening      Personal history of colon polyps, unspecified      (hemorrhoids, polyps, diverticulosis)    Surgeons: Jose Ramon Hamilton MD Anesthesiologist: Ludin Keating MD    Anesthesia Type: MAC ASA Status: 3            Anesthesia Type: MAC    Vitals Value Taken Time   /87 07/28/25 09:35   Temp  07/28/25 09:52   Pulse 63 07/28/25 09:38   Resp 18 07/28/25 09:38   SpO2 100 % 07/28/25 09:38   Vitals shown include unfiled device data.    [facility] AN Post Evaluation:   Patient Evaluated in PACU  Patient Participation: complete - patient participated  Level of Consciousness: awake and alert  Pain Score: 0  Pain Management: adequate  Airway Patency:patent  Yes    Nausea/Vomiting: none  Cardiovascular Status: acceptable  Respiratory Status: acceptable  Postoperative Hydration acceptable      Ludin Keating MD  7/28/2025 9:52 AM

## 2025-07-29 ENCOUNTER — TELEPHONE (OUTPATIENT)
Facility: CLINIC | Age: 75
End: 2025-07-29

## 2025-07-29 VITALS
RESPIRATION RATE: 23 BRPM | HEIGHT: 63 IN | HEART RATE: 66 BPM | BODY MASS INDEX: 26.05 KG/M2 | WEIGHT: 147 LBS | OXYGEN SATURATION: 99 % | DIASTOLIC BLOOD PRESSURE: 87 MMHG | SYSTOLIC BLOOD PRESSURE: 146 MMHG

## 2025-08-15 ENCOUNTER — MED REC SCAN ONLY (OUTPATIENT)
Dept: INTERNAL MEDICINE CLINIC | Facility: CLINIC | Age: 75
End: 2025-08-15

## 2025-08-25 ENCOUNTER — TELEPHONE (OUTPATIENT)
Facility: CLINIC | Age: 75
End: 2025-08-25

## 2025-08-25 DIAGNOSIS — R19.7 DIARRHEA, UNSPECIFIED TYPE: Primary | ICD-10-CM

## 2025-08-26 RX ORDER — COLESTIPOL HYDROCHLORIDE 1 G/1
2 TABLET ORAL NIGHTLY
Qty: 180 TABLET | Refills: 0 | Status: SHIPPED | OUTPATIENT
Start: 2025-08-26 | End: 2025-11-24

## (undated) DEVICE — Device

## (undated) DEVICE — KIT CLEAN ENDOKIT 1.1OZ GOWNX2

## (undated) DEVICE — KIT ENDO ORCAPOD 160/180/190

## (undated) DEVICE — KIT MFLD FOR SPEC COLL

## (undated) DEVICE — FORCEPS BX LG 2.4MM X 240CM NDL RAD JAW 4

## (undated) DEVICE — TUBING SCT CLR 6FT .25IN MDVC

## (undated) NOTE — LETTER
AUTHORIZATION FOR SURGICAL OPERATION OR OTHER PROCEDURE    1. I hereby authorize Dr. Kevin Ortez and the [facility] staff assigned to my case to perform the following operation and/or procedure at the [facility]:    _____Bilateral synvisc     2.  My physician has explained the nature and purpose of the operation or other procedure, possible alternative methods of treatment, the risks involved, and the possibility of complication to me.  I acknowledge that no guarantee has been made as to the result that may be obtained.  3.  I recognize that, during the course of this operation, or other procedure, unforseen conditions may necessitate additional or different procedure than those listed above.  I, therefore, further authorize and request that the above named physician, his/her physician assistants or designees perform such procedures as are, in his/her professional opinion, necessary and desirable.  4.  Any tissue or organs removed in the operation or other procedure may be disposed of by and at the discretion of the [facility] staff and [facility].  5.  I understand that in the event of a medical emergency, I will be transported by local paramedics to [facility] or other hospital emergency department.  6.  I certify that I have read and fully understand the above consent to operation and/or other procedure.    7.  I acknowledge that my physician has explained sedation/analgesia administration to me including the risks and benefits.  I consent to the administration of sedation/analgesia as may be necessary or desirable in the judgement of my physician.    Witness signature: ___________________________________________________ Date:  ______/______/_____                    Time:  ________ A.M.  P.M.       Patient Name:  ____Radha [facility] 6/5/1950 [id number]        (please print)       Patient signature:  ___________________________________________________             Relationship  to Patient:           []  Parent    Responsible person                          []  Spouse  In case of minor or                    [] Other  _____________   Incompetent name:  __________________________________________________                               (please print)      _____________      Responsible person  In case of minor or  Incompetent signature:  _______________________________________________    Statement of Physician  My signature below affirms that prior to the time of the procedure, I have explained to the patient and/or his/her guardian, the risks and benefits involved in the proposed treatment and any reasonable alternative to the proposed treatment.  I have also explained the risks and benefits involved in the refusal of the proposed treatment and have answered the patient's questions.                        Date:  ______/______/_______  Provider                      Signature:  __________________________________________________________       Time:  ___________ A.M    P.M.

## (undated) NOTE — LETTER
Date: Nehal 3, 2025      Patient Name: Radha August      : 1950        Thank you for choosing [facility] as your health care provider. Your physician has deemed the following medical service(s) necessary. However, your insurance plan may not pay for all of your health care and costs and may deny payment for this service. The fact that your insurance plan does not pay for an item or service does not mean you should not receive it. The purpose of this form is to help you make an informed decision about whether or not you want to receive this service(s) that may not be paid for by your insurance plan.    CPT Code  Description     Cost     24425,    Right knee injection and aspiration       I understand that the above mentioned service(s) or supply may not be covered by my insurance company. I agree to be financially responsible for the cost of this service or supply in the event of my insurance denies payment as a non-covered benefit.        ______________________________________________________________________  Signature of Patient or Patient's Representative  Relationship  Date    ______________________________________________________________________  Signature of Witness to signing of form   Printed Name

## (undated) NOTE — LETTER
Notifier: wishkicker       Patient Name: Radha August       Identification Number: [id number]                          Advance Beneficiary Notice of Noncoverage (ABN)   NOTE:  If Medicare doesn’t pay for D. Items/service(s) below, you may have to pay.  Medicare does not pay for everything, even some care that you or your health care provider have good reason to think you need. We expect Medicare may not pay for the D. items/service(s) below.  Items or Services Reason Medicare May Not Pay: Estimated Cost   Bilateral synvisc    __ Medicare does not cover this service      __ Medicare may not pay for this   item/service for your condition     __ Medicare may not pay for this item/service as often as this        WHAT YOU NEED TO DO NOW:  Read this notice, so you can make an informed decision about your care.  Ask us any questions that you may have after you finish reading.  Choose an option below about whether to receive the D. item/service(s)  listed above.  Note: If you choose Option 1 or 2, we may help you to use any other insurance that you might have, but Medicare cannot require us to do this.  OPTIONS: Check only one box.  We cannot choose a box for you.   OPTION 1. I want the D. item/service(s) listed above. You may ask to be paid now, but I also want Medicare billed for an official decision on payment, which is sent to me on a Medicare Summary Notice (MSN). I understand that if Medicare doesn’t pay, I am responsible for payment, but I can appeal to Medicare by following the directions on the MSN. If Medicare does pay, you will refund any payments I made to you, less co-pays or deductibles.  OPTION 2. I want the D. item/service(s) listed above, but do not bill Medicare. You may ask to be paid now as I am responsible for payment. I cannot appeal if Medicare is not billed.  OPTION 3. I don't want the D. item/service(s) listed above. I understand with this choice I am not responsible for payment, and I  cannot appeal to see if Medicare would pay.    H. Additional Information:    This notice gives our opinion, not an official Medicare decision. If you have other questions on this notice or Medicare billing, call 1-800-MEDICARE (1-573.332.3782/TTY: [phone]). Signing below means that you have received and understand this notice. You also receive a copy.  Signature: Date:       You have the right to get Medicare information in an accessible format, like large print, Braille, or audio. You also have the right to file a complaint if you feel you’ve been discriminated against. Visit Medicare.gov/about- us/dnseqkfcogzzo-xkehzmaedwluevgnc-krcmit.  According to the Paperwork Reduction Act of 1995, no persons are required to respond to a collection of information unless it displays a valid OMB control number. The valid OMB control number for this information collection is 9579-6302. The time required to complete this information collection is estimated to average 7 minutes per response, including the time to review instructions, search existing data resources, gather the data needed, and complete and review the information collection. If you have comments concerning the accuracy of the time estimate or suggestions for improving this form, please write to: CMS, [facility] [employer]     [place], Attn: BETTYE Reports Clearance Officer, [place], [place] [address].  Form CMS-R-131 (Exp. 1/31/2026) Form Approved OMB No. 4696-6683

## (undated) NOTE — LETTER
January 6, 2021    Kizzy Giuliana, [address] [address]     Patient: Torres Rodriguez   YOB: 1950   Date of Visit: 1/6/2021       Dear Dr. Trini Woodruff MD:    Thank you for referring Stephanie Roth to me for evaluation.  H Current Outpatient Medications   Medication Sig Dispense Refill   • atorvastatin 20 MG Oral Tab Take 1 tablet (20 mg total) by mouth daily. 90 tablet 0   • Metoprolol Succinate ER 25 MG Oral Tablet 24 Hr Take 1 tablet (25 mg total) by mouth daily.  90 table Both eyes: 1.0% Mydriacyl and 2.5% Prieto Synephrine @ 2:23 PM            Slit Lamp and Fundus Exam     Slit Lamp Exam       Right Left    Lids/Lashes Dermatochalasis, Meibomian gland dysfunction Dermatochalasis, Meibomian gland dysfunction    Conjunctiva/Sc If you have questions, please do not hesitate to call me. I look forward to following Lul Sorto along with you.     Sincerely,        Dasia Emmanuel MD        CC: No Recipients    Document electronically generated by: Dasia Emmanuel

## (undated) NOTE — LETTER
AUTHORIZATION FOR SURGICAL OPERATION OR OTHER PROCEDURE    1. I hereby authorize Dr. Kevin Ortez and the [facility] staff assigned to my case to perform the following operation and/or procedure at the [facility]:    Right knee injection and aspiration     2.  My physician has explained the nature and purpose of the operation or other procedure, possible alternative methods of treatment, the risks involved, and the possibility of complication to me.  I acknowledge that no guarantee has been made as to the result that may be obtained.  3.  I recognize that, during the course of this operation, or other procedure, unforseen conditions may necessitate additional or different procedure than those listed above.  I, therefore, further authorize and request that the above named physician, his/her physician assistants or designees perform such procedures as are, in his/her professional opinion, necessary and desirable.  4.  Any tissue or organs removed in the operation or other procedure may be disposed of by and at the discretion of the [facility] staff and [facility].  5.  I understand that in the event of a medical emergency, I will be transported by local paramedics to [facility] or other hospital emergency department.  6.  I certify that I have read and fully understand the above consent to operation and/or other procedure.    7.  I acknowledge that my physician has explained sedation/analgesia administration to me including the risks and benefits.  I consent to the administration of sedation/analgesia as may be necessary or desirable in the judgement of my physician.    Witness signature: ___________________________________________________ Date:  ______/______/_____                    Time:  ________ A.M.  P.M.       Patient Name:    Radha Sanchez August  6/5/1950  [id number]       Patient signature:  ___________________________________________________    Statement of Physician  My  signature below affirms that prior to the time of the procedure, I have explained to the patient and/or his/her guardian, the risks and benefits involved in the proposed treatment and any reasonable alternative to the proposed treatment.  I have also explained the risks and benefits involved in the refusal of the proposed treatment and have answered the patient's questions.                        Date:  ______/______/_______  Provider                      Signature:  __________________________________________________________       Time:  ___________ A.M    P.M.

## (undated) NOTE — LETTER
AUTHORIZATION FOR SURGICAL OPERATION OR OTHER PROCEDURE    1. I hereby authorize Dr. Kevin Ortez and the [facility] staff assigned to my case to perform the following operation and/or procedure at the [facility]:     Right knee [facility] and aspiration. [facility] [address],      2.  My physician has explained the nature and purpose of the operation or other procedure, possible alternative methods of treatment, the risks involved, and the possibility of complication to me.  I acknowledge that no guarantee has been made as to the result that may be obtained.  3.  I recognize that, during the course of this operation, or other procedure, unforseen conditions may necessitate additional or different procedure than those listed above.  I, therefore, further authorize and request that the above named physician, his/her physician assistants or designees perform such procedures as are, in his/her professional opinion, necessary and desirable.  4.  Any tissue or organs removed in the operation or other procedure may be disposed of by and at the discretion of the [facility] staff and [facility].  5.  I understand that in the event of a medical emergency, I will be transported by local paramedics to [facility] or other hospital emergency department.  6.  I certify that I have read and fully understand the above consent to operation and/or other procedure.    7.  I acknowledge that my physician has explained sedation/analgesia administration to me including the risks and benefits.  I consent to the administration of sedation/analgesia as may be necessary or desirable in the judgement of my physician.        Witness signature: ___________________________________________________ Date:  ______/______/_____                    Time:  ________ A.M.  P.M.            Patient signature:  ___________________________________________________      Radha [facility]  6/5/1950  [id number]        Statement  of Physician  My signature below affirms that prior to the time of the procedure, I have explained to the patient and/or his/her guardian, the risks and benefits involved in the proposed treatment and any reasonable alternative to the proposed treatment.  I have also explained the risks and benefits involved in the refusal of the proposed treatment and have answered the patient's questions.                        Date:  ______/______/_______  Provider                      Signature:  __________________________________________________________       Time:  ___________ A.M    P.M.

## (undated) NOTE — LETTER
AUTHORIZATION FOR SURGICAL OPERATION OR OTHER PROCEDURE    1. I hereby authorize Dr. Kevin Ortez and the [facility] Office staff assigned to my case to perform the following operation and/or procedure at the [facility] Office:    Left carpal tunnel     2.  My physician has explained the nature and purpose of the operation or other procedure, possible alternative methods of treatment, the risks involved, and the possibility of complication to me.  I acknowledge that no guarantee has been made as to the result that may be obtained.  3.  I recognize that, during the course of this operation, or other procedure, unforseen conditions may necessitate additional or different procedure than those listed above.  I, therefore, further authorize and request that the above named physician, his/her physician assistants or designees perform such procedures as are, in his/her professional opinion, necessary and desirable.  4.  Any tissue or organs removed in the operation or other procedure may be disposed of by and at the discretion of the [facility] Office staff and [facility].  5.  I understand that in the event of a medical emergency, I will be transported by local paramedics to [facility] or other hospital emergency department.  6.  I certify that I have read and fully understand the above consent to operation and/or other procedure.    7.  I acknowledge that my physician has explained sedation/analgesia administration to me including the risks and benefits.  I consent to the administration of sedation/analgesia as may be necessary or desirable in the judgement of my physician.    Witness signature: ___________________________________________________ Date:  ______/______/_____                    Time:  ________ A.M.  P.M.       Patient Name:  Radha Sanchez August  6/5/1950  [id number]       Patient signature:  ___________________________________________________    Statement of Physician  My signature below  affirms that prior to the time of the procedure, I have explained to the patient and/or his/her guardian, the risks and benefits involved in the proposed treatment and any reasonable alternative to the proposed treatment.  I have also explained the risks and benefits involved in the refusal of the proposed treatment and have answered the patient's questions.                        Date:  ______/______/_______  Provider                      Signature:  __________________________________________________________       Time:  ___________ ARussellM    P.M.

## (undated) NOTE — LETTER
4/14/2025          Radha Sanchez [address] [address]         Dear Radha,    Our records indicate that the tests ordered for you by SISSY Monzon  have not been done.    C. diff toxigenic PCR (OPT) (Order #[id number]) on 3/11/25       If you have, in fact, already completed the tests or you do not wish to have the tests done, please contact our office at THE NUMBER LISTED BELOW.  Otherwise, please proceed with the testing.  Enclosed is a duplicate order for your convenience.        Sincerely,    SISSY Monzon  [facility]  [address] [facility] [address]  [address] [facility] [address] [phone]

## (undated) NOTE — LETTER
April 24, 2019    Anne Marie Boyd MD  [address] [address]     Patient: Armida Ledezma   YOB: 1950   Date of Visit: 4/24/2019       Dear Dr. Kenya Cortez MD:    Thank you for referring Jenny Gracia to me for evaluation.  Her Drug use: No      Medications:    Current Outpatient Medications:  METFORMIN  MG Oral Tab TAKE 1 TABLET THREE TIMES DAILY Disp: 270 tablet Rfl: 1   ACCU-CHEK JOSE MANUEL PLUS In Vitro Strip CHECK BLOOD SUGAR THREE TIMES DAILY Disp: 300 strip Rfl: 1   Bl Extraocular Movement       Right Left     Full, Ortho Full, Ortho          Dilation     Both eyes:  Paremyd x 2  @ 1:12 PM            Slit Lamp and Fundus Exam     Slit Lamp Exam       Right Left    Lids/Lashes Dermatochalasis, Meibomian gland dysfunction No orders of the defined types were placed in this encounter.       Meds This Visit:  Requested Prescriptions      No prescriptions requested or ordered in this encounter        Follow up instructions:  Return in about 1 year (around 4/24/2020) for Diabetic

## (undated) NOTE — LETTER
October 20, 2021         Yosef Mendez MD  [address]  [address] [address]      Patient: Jonathan Walden   YOB: 1950   Date of Visit: 10/20/2021       Dear Dr. David Frost MD,    I saw your patient, Jonathan Walden, on 10/20/20

## (undated) NOTE — LETTER
WHERE IS YOUR PAIN NOW?  Lelo the areas on your body where you feel the described sensations.  Use the appropriate symbol.  Lelo the areas of radiation.  Include all affected areas.  Just to complete the picture, please draw in the face.     ACHE:  ^ ^ ^   NUMBNESS:  0000   PINS & NEEDLES:  = = = =                              ^ ^ ^                       0000              = = = =                                    ^ ^ ^                       0000            = = = =      BURNING:  XXXX   STABBING: ////                  XXXX                ////                         XXXX          ////     Please lelo the line below indicating your degree of pain right now  with 0 being no pain 10 being the worst pain possible.                                         0             1             2              3             4              5              6              7             8             9             10         Patient Signature:

## (undated) NOTE — LETTER
AUTHORIZATION FOR SURGICAL OPERATION OR OTHER PROCEDURE    1. I hereby authorize Dr. Jennyfer Ferguson and the [facility] staff assigned to my case to perform the following operation and/or procedure at the [facility] Office:    Right Knee injection w/ Orthovisc x3 under US guidance     2. My physician has explained the nature and purpose of the operation or other procedure, possible alternative methods of treatment, the risks involved, and the possibility of complication to me. I acknowledge that no guarantee has been made as to the result that may be obtained. 3.  I recognize that, during the course of this operation, or other procedure, unforseen conditions may necessitate additional or different procedure than those listed above. I, therefore, further authorize and request that the above named physician, his/her physician assistants or designees perform such procedures as are, in his/her professional opinion, necessary and desirable. 4.  Any tissue or organs removed in the operation or other procedure may be disposed of by and at the discretion of the [facility] staff and [facility]. 5.  I understand that in the event of a medical emergency, I will be transported by local paramedics to [facility] or other hospital emergency department. 6.  I certify that I have read and fully understand the above consent to operation and/or other procedure. 7.  I acknowledge that my physician has explained sedation/analgesia administration to me including the risks and benefits. I consent to the administration of sedation/analgesia as may be necessary or desirable in the judgement of my physician. Witness signature: ___________________________________________________ Date:  ______/______/_____                    Time:  ________ A. M.  P.M. Patient Name: Abbie Query.  6/5/1950.   [id number]         Patient signature: ___________________________________________________                     Statement of Physician  My signature below affirms that prior to the time of the procedure, I have explained to the patient and/or his/her guardian, the risks and benefits involved in the proposed treatment and any reasonable alternative to the proposed treatment. I have also explained the risks and benefits involved in the refusal of the proposed treatment and have answered the patient's questions.                         Date:  ______/______/_______  Provider                      Signature:  __________________________________________________________       Time:  ___________ ASHA CHUA

## (undated) NOTE — MR AVS SNAPSHOT
[address] [facility] [address]  [phone]               Thank you for choosing us for your health care visit with Anil Price MD.  We are glad to serve you and happy to provide you with this summary of your visit. [address] [facility] (MRI Only)  [address]    It is the patient's responsibility to check with and follow their insurance company's guidelines for prior authorization for this test.  You may be held responsible for payment in full if pr TAKE ONE-HALF TABLET BY MOUTH DAILY   Commonly known as: Toprol XL           * Metoprolol Succinate ER 25 MG Tb24   TAKE 1/2 TABLET BY MOUTH DAILY   Commonly known as:   Toprol XL           ONETOUCH CLINT COOPER 28X Misc   use as directed twice daily are inactive.      HOW TO GET STARTED: HOW TO STAY MOTIVATED:   Start activities slowly and build up over time Do what you like   Get your heart pumping – brisk walking, biking, swimming Even 10 minute increments are effective and add up over the week   2 ½

## (undated) NOTE — LETTER
January 8, 2022    Pradip Yarbrough, [address] [address]     Patient: Adry Root   YOB: 1950   Date of Visit: 1/8/2022       Dear Dr. Toni Joseph MD:    Thank you for referring Sergio Mckeon to me for evaluation.  H Medications   Medication Sig Dispense Refill   • Blood Glucose Monitoring Suppl (TRUE METRIX METER) w/Device Does not apply Kit USE AS DIRECTED 1 kit 0   • LISINOPRIL-HYDROCHLOROTHIAZIDE 20-25 MG Oral Tab TAKE 1 TABLET EVERY DAY 90 tablet 1   • METFORMIN 8 8: 40 AM            Slit Lamp and Fundus Exam     Slit Lamp Exam       Right Left    Lids/Lashes Dermatochalasis, Meibomian gland dysfunction Dermatochalasis, Meibomian gland dysfunction    Conjunctiva/Sclera Ocular Melanosis Ocular Melanosis    Cornea Radha prescriptions requested or ordered in this encounter        Follow up instructions:  Return in about 1 year (around 1/8/2023) for Diabetic eye exam.    1/8/2022  Scribed by: Faiza Funez MD        If you have questions, please do not hesitate to call m

## (undated) NOTE — LETTER
AUTHORIZATION FOR SURGICAL OPERATION OR OTHER PROCEDURE    1. I hereby authorize Dr. Krysten Carroll, and [place] [employer] [facility] staff assigned to my case to perform the following operation and/or procedure at the [facility] [facility] [employer] [facility]:    _____________________Aspiration & cortisone injection right knee____________________________      _______________________________________________________________________________________________    2. My physician has explained the nature and purpose of the operation or other procedure, possible alternative methods of treatment, the risks involved, and the possibility of complication to me. I acknowledge that no guarantee has been made as to the result that may be obtained. 3.  I recognize that, during the course of this operation, or other procedure, unforseen conditions may necessitate additional or different procedure than those listed above. I, therefore, further authorize and request that the above named physician, his/her physician assistants or designees perform such procedures as are, in his/her professional opinion, necessary and desirable. 4.  Any tissue or organs removed in the operation or other procedure may be disposed of by and at the discretion of the [facility] [employer] [facility] and [facility]. 5.  I understand that in the event of a medical emergency, I will be transported by local paramedics to [facility] or other hospital emergency department. 6.  I certify that I have read and fully understand the above consent to operation and/or other procedure. 7.  I acknowledge that my physician has explained sedation/analgesia administration to me including the risks and benefits. I consent to the administration of sedation/analgesia as may be necessary or desirable in the judgement of my physician. Witness signature: ___________________________________________________ Date:  ______/______/_____                    Time:  ________ A. M.  P.M.        Patient Name:  ______________________________________________________  (please print)      Patient signature:  ___________________________________________________             Relationship to Patient:           []  Parent    Responsible person                          []  Spouse  In case of minor or                    [] Other  _____________   Incompetent name:  __________________________________________________                               (please print)      _____________      Responsible person  In case of minor or  Incompetent signature:  _______________________________________________    Statement of Physician  My signature below affirms that prior to the time of the procedure, I have explained to the patient and/or his/her guardian, the risks and benefits involved in the proposed treatment and any reasonable alternative to the proposed treatment. I have also explained the risks and benefits involved in the refusal of the proposed treatment and have answered the patient's questions.                         Date:  ______/______/_______  Provider                      Signature:  __________________________________________________________       Time:  ___________ A.M    P.M.

## (undated) NOTE — LETTER
Date: May 17, 2024      Patient Name: Radha August      : 1950        Thank you for choosing [facility] as your health care provider. Your physician has deemed the following medical service(s) necessary. However, your insurance plan may not pay for all of your health care and costs and may deny payment for this service. The fact that your insurance plan does not pay for an item or service does not mean you should not receive it. The purpose of this form is to help you make an informed decision about whether or not you want to receive this service(s) that may not be paid for by your insurance plan.    CPT Code Description     Cost     _________ Right knee injection    _________ ______________________________ _____________      _________ ______________________________ _____________      I understand that the above mentioned service(s) or supply may not be covered by my insurance company. I agree to be financially responsible for the cost of this service or supply in the event of my insurance denies payment as a non-covered benefit.        ______________________________________________________________________  Signature of Patient or Patient's Representative  Relationship  Date    ______________________________________________________________________  Signature of Witness to signing of form   Printed Name

## (undated) NOTE — LETTER
September 12, 2017         Ray Muller MD  [address]      Patient: Saima Avila   YOB: 1950   Date of Visit: 9/12/2017       Dear Dr. Kendy Nguyen MD,    I saw your patient, Saima Avila, on 9/12/2017.  En

## (undated) NOTE — LETTER
AUTHORIZATION FOR SURGICAL OPERATION OR OTHER PROCEDURE    1. I hereby authorize Dr. Kevin Ortez and the [facility] Office staff assigned to my case to perform the following operation and/or procedure at the [facility] Office:    BILATERAL knee synvisc injections 2/3    2.  My physician has explained the nature and purpose of the operation or other procedure, possible alternative methods of treatment, the risks involved, and the possibility of complication to me.  I acknowledge that no guarantee has been made as to the result that may be obtained.  3.  I recognize that, during the course of this operation, or other procedure, unforseen conditions may necessitate additional or different procedure than those listed above.  I, therefore, further authorize and request that the above named physician, his/her physician assistants or designees perform such procedures as are, in his/her professional opinion, necessary and desirable.  4.  Any tissue or organs removed in the operation or other procedure may be disposed of by and at the discretion of the [facility] Office staff and [facility].  5.  I understand that in the event of a medical emergency, I will be transported by local paramedics to [facility] or other hospital emergency department.  6.  I certify that I have read and fully understand the above consent to operation and/or other procedure.    7.  I acknowledge that my physician has explained sedation/analgesia administration to me including the risks and benefits.  I consent to the administration of sedation/analgesia as may be necessary or desirable in the judgement of my physician.    Witness signature: ___________________________________________________ Date:  ______/______/_____                    Time:  ________ A.M.  P.M.       Patient Name:  Radha Sanchez August  6/5/1950  [id number]         Patient signature:  ___________________________________________________                 Statement of  Physician  My signature below affirms that prior to the time of the procedure, I have explained to the patient and/or his/her guardian, the risks and benefits involved in the proposed treatment and any reasonable alternative to the proposed treatment.  I have also explained the risks and benefits involved in the refusal of the proposed treatment and have answered the patient's questions.                        Date:  ______/______/_______  Provider                      Signature:  __________________________________________________________       Time:  ___________ A.M    P.M.

## (undated) NOTE — LETTER
Date: 2025      Patient Name: Radha August      : 1950        Thank you for choosing [facility] as your health care provider. Your physician has deemed the following medical service(s) necessary. However, your insurance plan may not pay for all of your health care and costs and may deny payment for this service. The fact that your insurance plan does not pay for an item or service does not mean you should not receive it. The purpose of this form is to help you make an informed decision about whether or not you want to receive this service(s) that may not be paid for by your insurance plan.    CPT Code Description     Cost     Carpal tunnel injection       I understand that the above mentioned service(s) or supply may not be covered by my insurance company. I agree to be financially responsible for the cost of this service or supply in the event of my insurance denies payment as a non-covered benefit.        ______________________________________________________________________  Signature of Patient or Patient's Representative  Relationship  Date    ______________________________________________________________________  Signature of Witness to signing of form   Printed Name

## (undated) NOTE — LETTER
VICKY. Notifier: [employer]. Patient Name: Radha August Identification Number: [id number]                          Advance Beneficiary Notice of Noncoverage (ABN)   NOTE:  If Medicare doesn’t pay for D. item/service(s) below, you may have to pay.  Medicare does not pay for everything, even some care that you or your health care provider have good reason to think you need. We expect Medicare may not pay for the D. item/service(s) below.  D. Items or Services  Left carpal tunnel E. Reason Medicare May Not Pay: F. Estimated Cost   __ EKG ($87.00)  __ Pap smear ($101) __Pelvic/Breast ($147.00)  __ Ear Irrigation ($138)  _x_ Injection(s)  ___ Tdap ($181)       ___ Meningitis ($290)   __Prevnar ($555)  ___ Td ($66)              ___ Prevnar 20 ($549)  ___ Hep A ($152)     ___ Prolia ($1827)         __ Xiaflex ($            )   ___ Hep B ($150)     ___ Pneumovax ($287)                                         ___ Vaccine Administration ($65)   __ Medicare does not cover this service      __ Medicare may not pay for this   item/service for your condition     __ Medicare may not pay for this item/service as often as this   $1200.00     WHAT YOU NEED TO DO NOW:  Read this notice, so you can make an informed decision about your care.  Ask us any questions that you may have after you finish reading.  Choose an option below about whether to receive the D. item/service(s) listed above.  Note: If you choose Option 1 or 2, we may help you to use any other insurance that you might have, but Medicare cannot require us to do this.  G. OPTIONS: Check only one box.  We cannot choose a box for you.   OPTION 1. I want the D. item/service(s) listed above. You may ask to be paid now, but I also want Medicare billed for an official decision on payment, which is sent to me on a Medicare Summary Notice (MSN). I understand that if Medicare doesn’t pay, I am responsible for payment, but I can appeal to Medicare by following  the directions on the MSN. If Medicare does pay, you will refund any payments I made to you, less co-pays or deductibles.  OPTION 2. I want the D. item/service(s) listed above, but do not bill Medicare. You may ask to be paid now as I am responsible for payment. I cannot appeal if Medicare is not billed.  OPTION 3. I don't want the D. item/service(s) listed above. I understand with this choice I am not responsible for payment, and I cannot appeal to see if Medicare would pay.    H. Additional Information:    This notice gives our opinion, not an official Medicare decision. If you have other questions on this notice or Medicare billing, call 1-800-MEDICARE (1-185.881.9844/TTY: 1-284.524.9613). Signing below means that you have received and understand this notice. You also receive a copy.  I. Signature: J. Date:       You have the right to get Medicare information in an accessible format, like large print, Braille, or audio. You also have the right to file a complaint if you feel you’ve been discriminated against. Visit Medicare.gov/about- us/qdoolltisdzpi-cvfqyzwtliqhekdby-ybnuns.  According to the Paperwork Reduction Act of 1995, no persons are required to respond to a collection of information unless it displays a valid OMB control number. The valid OMB control number for this information collection is 8087-3778. The time required to complete this information collection is estimated to average 7 minutes per response, including the time to review instructions, search existing data resources, gather the data needed, and complete and review the information collection. If you have comments concerning the accuracy of the time estimate or suggestions for improving this form, please write to: CMS, [address]     [place] Attn: BETTYE Reports Clearance Officer, [place], [place] [address].  Form CMS-R-131 (Exp. 1/31/2026) Form Approved OMB No. [id number]

## (undated) NOTE — Clinical Note
Dear Dr. Kelly Hinson,  Thank you for sending Vel [place] to see me for physiatry consultation. I appreciate your confidence in me to care for your patients. Please feel free call me with any questions at [phone] or contact me through [facility]   Sincerely, Farrah Calvilol MD Board Certified, Physical Medicine and Rehabilitation Specializing in [address] [facility], Spine Medicine and [address] [facility]

## (undated) NOTE — LETTER
Notifier: [employer]       Patient Name: Radha August       Identification Number: [id number]                          Advance Beneficiary Notice of Noncoverage (ABN)   NOTE:  If Medicare doesn’t pay for D. Items/service(s) below, you may have to pay.  Medicare does not pay for everything, even some care that you or your health care provider have good reason to think you need. We expect Medicare may not pay for the D. items/service(s) below.  Items or Services  BILATERAL knee synvisc injections 2/3 Reason Medicare May Not Pay: Estimated Cost   __EKG ($129.00)  __Pap smear ($48.23) __Pelvic/Breast ($65.00)  __ Ear Irrigation ($149)  _X_ Injection(s)  ___ Tdap ($70)       ___ Meningitis ($206)   __Prevnar ($285)  ___ Td ($51)            ___Shingrix ($215)        __Prevnar 20 ($309)  ___ Hep A ($156)   ___Prolia ($1827.00)     __ Xiaflex ($              )   ___ Hep B ($167)      __Pneumovax ($155)                                            ___ Vaccine Administration ($31)   __ Medicare does not cover this service      __ Medicare may not pay for this   item/service for your condition     __ Medicare may not pay for this item/service as often as this        WHAT YOU NEED TO DO NOW:  Read this notice, so you can make an informed decision about your care.  Ask us any questions that you may have after you finish reading.  Choose an option below about whether to receive the D. item/service(s)  listed above.  Note: If you choose Option 1 or 2, we may help you to use any other insurance that you might have, but Medicare cannot require us to do this.  OPTIONS: Check only one box.  We cannot choose a box for you.   OPTION 1. I want the D. item/service(s) listed above. You may ask to be paid now, but I also want Medicare billed for an official decision on payment, which is sent to me on a Medicare Summary Notice (MSN). I understand that if Medicare doesn’t pay, I am responsible for payment, but I can appeal to  Medicare by following the directions on the MSN. If Medicare does pay, you will refund any payments I made to you, less co-pays or deductibles.  OPTION 2. I want the D. item/service(s) listed above, but do not bill Medicare. You may ask to be paid now as I am responsible for payment. I cannot appeal if Medicare is not billed.  OPTION 3. I don't want the D. item/service(s) listed above. I understand with this choice I am not responsible for payment, and I cannot appeal to see if Medicare would pay.    H. Additional Information:    This notice gives our opinion, not an official Medicare decision. If you have other questions on this notice or Medicare billing, call 1-800-MEDICARE (1-544.295.6151/TTY: 1-501.215.2878). Signing below means that you have received and understand this notice. You also receive a copy.  Signature: Date:       You have the right to get Medicare information in an accessible format, like large print, Braille, or audio. You also have the right to file a complaint if you feel you’ve been discriminated against. Visit Medicare.gov/about- us/frnhsbmkmneot-xfiznurlgfyxvgrsl-gexgku.  According to the Paperwork Reduction Act of 1995, no persons are required to respond to a collection of information unless it displays a valid OMB control number. The valid OMB control number for this information collection is 6440-2604. The time required to complete this information collection is estimated to average 7 minutes per response, including the time to review instructions, search existing data resources, gather the data needed, and complete and review the information collection. If you have comments concerning the accuracy of the time estimate or suggestions for improving this form, please write to: CMS, [facility]     [place] Attn: BETTYE Reports Clearance Officer, [place], [place] [address].  Form CMS-R-131 (Exp. 1/31/2026) Form Approved OMB No. [id number]

## (undated) NOTE — LETTER
AUTHORIZATION FOR SURGICAL OPERATION OR OTHER PROCEDURE    1. I hereby authorize Dr. Kevin Ortez and the [facility] staff assigned to my case to perform the following operation and/or procedure at the [facility]:    _______________________________________________________________________________________________      Right knee injection  _______________________________________________________________________________________________    2.  My physician has explained the nature and purpose of the operation or other procedure, possible alternative methods of treatment, the risks involved, and the possibility of complication to me.  I acknowledge that no guarantee has been made as to the result that may be obtained.  3.  I recognize that, during the course of this operation, or other procedure, unforseen conditions may necessitate additional or different procedure than those listed above.  I, therefore, further authorize and request that the above named physician, his/her physician assistants or designees perform such procedures as are, in his/her professional opinion, necessary and desirable.  4.  Any tissue or organs removed in the operation or other procedure may be disposed of by and at the discretion of the [facility] staff and [facility].  5.  I understand that in the event of a medical emergency, I will be transported by local paramedics to [facility] or other hospital emergency department.  6.  I certify that I have read and fully understand the above consent to operation and/or other procedure.    7.  I acknowledge that my physician has explained sedation/analgesia administration to me including the risks and benefits.  I consent to the administration of sedation/analgesia as may be necessary or desirable in the judgement of my physician.    Witness signature: ___________________________________________________ Date:  ______/______/_____                    Time:  ________  ERNST Garcia Laura August  [id number]  6/5/1950    (please print)       Patient signature:  ___________________________________________________               Statement of Physician  My signature below affirms that prior to the time of the procedure, I have explained to the patient and/or his/her guardian, the risks and benefits involved in the proposed treatment and any reasonable alternative to the proposed treatment.  I have also explained the risks and benefits involved in the refusal of the proposed treatment and have answered the patient's questions.                        Date:  ______/______/_______  Provider                      Signature:  __________________________________________________________       Time:  ___________ GUALBERTO CHUA

## (undated) NOTE — LETTER
[facility] Notifier: [facility]. Patient Name: Radha August Identification Number: [id number]                          Advance Beneficiary Notice of Noncoverage (ABN)   NOTE:  If Medicare doesn’t pay for D. item/service(s) below, you may have to pay.  Medicare does not pay for everything, even some care that you or your health care provider have good reason to think you need. We expect Medicare may not pay for the D. item/service(s) below.  D. Items or Services  Right knee injection and aspiration  E. Reason Medicare May Not Pay: F. Estimated Cost   __ EKG ($87.00)  __ Pap smear ($101) __Pelvic/Breast ($147.00)  __ Ear Irrigation ($138)  _x_ Injection(s)  ___ Tdap ($181)       ___ Meningitis ($290)   __Prevnar ($555)  ___ Td ($66)              ___ Prevnar 20 ($549)  ___ Hep A ($152)     ___ Prolia ($1827)         __ Xiaflex ($            )   ___ Hep B ($150)     ___ Pneumovax ($287)                                         ___ Vaccine Administration ($65)   __ Medicare does not cover this service      __ Medicare may not pay for this   item/service for your condition     __ Medicare may not pay for this item/service as often as this        WHAT YOU NEED TO DO NOW:  Read this notice, so you can make an informed decision about your care.  Ask us any questions that you may have after you finish reading.  Choose an option below about whether to receive the D. item/service(s) listed above.  Note: If you choose Option 1 or 2, we may help you to use any other insurance that you might have, but Medicare cannot require us to do this.  G. OPTIONS: Check only one box.  We cannot choose a box for you.   OPTION 1. I want the D. item/service(s) listed above. You may ask to be paid now, but I also want Medicare billed for an official decision on payment, which is sent to me on a Medicare Summary Notice (MSN). I understand that if Medicare doesn’t pay, I am responsible for payment, but I can appeal to Medicare by  following the directions on the MSN. If Medicare does pay, you will refund any payments I made to you, less co-pays or deductibles.  OPTION 2. I want the D. item/service(s) listed above, but do not bill Medicare. You may ask to be paid now as I am responsible for payment. I cannot appeal if Medicare is not billed.  OPTION 3. I don't want the D. item/service(s) listed above. I understand with this choice I am not responsible for payment, and I cannot appeal to see if Medicare would pay.    H. Additional Information:    This notice gives our opinion, not an official Medicare decision. If you have other questions on this notice or Medicare billing, call 1-800-MEDICARE (1-321.167.4600/TTY: 1-830.704.8816). Signing below means that you have received and understand this notice. You also receive a copy.  I. Signature: J. Date:       You have the right to get Medicare information in an accessible format, like large print, Braille, or audio. You also have the right to file a complaint if you feel you’ve been discriminated against. Visit Medicare.gov/about- us/loszxbqptlctv-kdtdeybklqohqrzip-txwsgp.  According to the Paperwork Reduction Act of 1995, no persons are required to respond to a collection of information unless it displays a valid OMB control number. The valid OMB control number for this information collection is [id number]. The time required to complete this information collection is estimated to average 7 minutes per response, including the time to review instructions, search existing data resources, gather the data needed, and complete and review the information collection. If you have comments concerning the accuracy of the time estimate or suggestions for improving this form, please write to: CMS, [facility]     [place] Attn: BETTYE Reports Clearance Officer, [place], [place] [address].  Form CMS-R-131 (Exp. 1/31/2026) Form Approved OMB No. [id number]

## (undated) NOTE — LETTER
Notifier: [employer]       Patient Name: Radha August       Identification Number: [id number]                          Advance Beneficiary Notice of Noncoverage (ABN)   NOTE:  If Medicare doesn’t pay for D. Items/service(s) below, you may have to pay.  Medicare does not pay for everything, even some care that you or your health care provider have good reason to think you need. We expect Medicare may not pay for the D. items/service(s) below.  Items or Services  Bilateral knee Synvisc 3/3 Reason Medicare May Not Pay: Estimated Cost   __EKG ($129.00)  __Pap smear ($48.23) __Pelvic/Breast ($65.00)  __ Ear Irrigation ($149)  _x_ Injection(s)  ___ Tdap ($70)       ___ Meningitis ($206)   __Prevnar ($285)  ___ Td ($51)            ___Shingrix ($215)        __Prevnar 20 ($309)  ___ Hep A ($156)   ___Prolia ($1827.00)     __ Xiaflex ($              )   ___ Hep B ($167)      __Pneumovax ($155)                                            ___ Vaccine Administration ($31)   __ Medicare does not cover this service      __ Medicare may not pay for this   item/service for your condition     __ Medicare may not pay for this item/service as often as this   $3600.00     WHAT YOU NEED TO DO NOW:  Read this notice, so you can make an informed decision about your care.  Ask us any questions that you may have after you finish reading.  Choose an option below about whether to receive the D. item/service(s)  listed above.  Note: If you choose Option 1 or 2, we may help you to use any other insurance that you might have, but Medicare cannot require us to do this.  OPTIONS: Check only one box.  We cannot choose a box for you.   OPTION 1. I want the D. item/service(s) listed above. You may ask to be paid now, but I also want Medicare billed for an official decision on payment, which is sent to me on a Medicare Summary Notice (MSN). I understand that if Medicare doesn’t pay, I am responsible for payment, but I can appeal to Medicare  by following the directions on the MSN. If Medicare does pay, you will refund any payments I made to you, less co-pays or deductibles.  OPTION 2. I want the D. item/service(s) listed above, but do not bill Medicare. You may ask to be paid now as I am responsible for payment. I cannot appeal if Medicare is not billed.  OPTION 3. I don't want the D. item/service(s) listed above. I understand with this choice I am not responsible for payment, and I cannot appeal to see if Medicare would pay.    H. Additional Information:    This notice gives our opinion, not an official Medicare decision. If you have other questions on this notice or Medicare billing, call 1-800-MEDICARE (1-911.176.4292/TTY: [phone]). Signing below means that you have received and understand this notice. You also receive a copy.  Signature: Date:       You have the right to get Medicare information in an accessible format, like large print, Braille, or audio. You also have the right to file a complaint if you feel you’ve been discriminated against. Visit Medicare.gov/about- us/lygsxjbnjrqbk-kiwgdgozlfoezupyw-zcmhwh.  According to the Paperwork Reduction Act of 1995, no persons are required to respond to a collection of information unless it displays a valid OMB control number. The valid OMB control number for this information collection is [id number]. The time required to complete this information collection is estimated to average 7 minutes per response, including the time to review instructions, search existing data resources, gather the data needed, and complete and review the information collection. If you have comments concerning the accuracy of the time estimate or suggestions for improving this form, please write to: CMS, [facility] [employer]     [place] Attn: BETTYE Reports Clearance Officer, [place], [place] [address].  Form CMS-R-131 (Exp. 1/31/2026) Form Approved OMB No. [id number]

## (undated) NOTE — LETTER
Date: 2023      Patient Name: Balwinder Candelaria      : 1950        Thank you for choosing  [facility] as your health care provider. Your physician has deemed the following medical service(s) necessary. However, your insurance plan may not pay for all of your health care and costs and may deny payment for this service. The fact that your insurance plan does not pay for an item or service does not mean you should not receive it. The purpose of this form is to help you make an informed decision about whether or not you want to receive this service(s) that may not be paid for by your insurance plan. CPT Code Description     Cost     _________ Right Knee injection w/ Orthovisc x3 under US guidance             _____________          I understand that the above mentioned service(s) or supply may not be covered by my insurance company.  I agree to be financially responsible for the cost of this service or supply in the event of my insurance denies payment as a non-covered benefit.        ______________________________________________________________________  Signature of Patient or Patient's Representative  Relationship  Date    ______________________________________________________________________  Signature of Witness to signing of form   Printed Name

## (undated) NOTE — Clinical Note
Dear Dr. Mathisa,  I had the opportunity to see your patient Radha August recently. I appreciate your confidence in me to care for your patients. Please feel free call me with any questions at [phone] or contact me through Epic.  Sincerely, Leopoldo Ortez MD Board Certified, Physical Medicine and Rehabilitation Specializing in Sports Medicine, Spine Medicine and Electrodiagnostic Medicine [facility]

## (undated) NOTE — LETTER
AUTHORIZATION FOR SURGICAL OPERATION OR OTHER PROCEDURE    1. I hereby authorize Dr. Kevin Ortez and the [facility] staff assigned to my case to perform the following operation and/or procedure at the [facility]:    Bilateral knee Synvisc 3/3     2.  My physician has explained the nature and purpose of the operation or other procedure, possible alternative methods of treatment, the risks involved, and the possibility of complication to me.  I acknowledge that no guarantee has been made as to the result that may be obtained.  3.  I recognize that, during the course of this operation, or other procedure, unforseen conditions may necessitate additional or different procedure than those listed above.  I, therefore, further authorize and request that the above named physician, his/her physician assistants or designees perform such procedures as are, in his/her professional opinion, necessary and desirable.  4.  Any tissue or organs removed in the operation or other procedure may be disposed of by and at the discretion of the [facility] staff and [facility].  5.  I understand that in the event of a medical emergency, I will be transported by local paramedics to [facility] or other hospital emergency department.  6.  I certify that I have read and fully understand the above consent to operation and/or other procedure.    7.  I acknowledge that my physician has explained sedation/analgesia administration to me including the risks and benefits.  I consent to the administration of sedation/analgesia as may be necessary or desirable in the judgement of my physician.    Witness signature: ___________________________________________________ Date:  ______/______/_____                    Time:  ________ A.M.  P.M.       Patient Name: Radha Sanchez August  6/5/1950  [id number]     Patient signature:  ___________________________________________________    Statement of Physician  My signature below  affirms that prior to the time of the procedure, I have explained to the patient and/or his/her guardian, the risks and benefits involved in the proposed treatment and any reasonable alternative to the proposed treatment.  I have also explained the risks and benefits involved in the refusal of the proposed treatment and have answered the patient's questions.                        Date:  ______/______/_______  Provider                      Signature:  __________________________________________________________       Time:  ___________ ARussellM    P.M.

## (undated) NOTE — LETTER
[facility] [address], [facility]   Date:   7/25/2023     Name:   Idalia Noland    YOB: 1950   MRN:   [id number]       WHERE IS YOUR PAIN NOW? Lelo the areas on your body where you feel the described sensations. Use the appropriate symbol. Neeraj James the areas of radiation. Include all affected areas. Just to complete the picture, please draw in the face. ACHE:  ^ ^ ^   NUMBNESS:  0000   PINS & NEEDLES:  = = = =                              ^ ^ ^                       0000              = = = =                                    ^ ^ ^                       0000            = = = =      BURNING:  XXXX   STABBING: ////                  XXXX                ////                         XXXX          ////     Please lelo the line below indicating your degree of pain right now  with 0 being no pain 10 being the worst pain possible.                                          0             1             2              3             4              5              6              7             8             9             10         Patient Signature:

## (undated) NOTE — ED AVS SNAPSHOT
Aide Pabon   MRN: [id number]    Department:  [facility] Emergency Department   Date of Visit:  9/30/2019           Disclosure     Insurance plans vary and the physician(s) referred by the ER may not be covered by your plan.  Please contac within the next three months to obtain basic health screening including reassessment of your blood pressure.     IF THERE IS ANY CHANGE OR WORSENING OF YOUR CONDITION, CALL YOUR PRIMARY CARE PHYSICIAN AT ONCE OR RETURN IMMEDIATELY TO THE EMERGENCY DEPARTMEN

## (undated) NOTE — LETTER
January 16, 2024      No Recipients     Patient: Radha August   YOB: 1950   Date of Visit: 1/16/2024       Dear Dr. Garcia Recipients:    Thank you for referring Radha August to me for evaluation. Here is my assessment and plan of care:    Radha August is a 73 year old female.    HPI:     HPI    Patient is here for a diabetic exam.  She complains of blurry vision at distance and near for a couple months.        Pt has been a diabetic for 11 years       Pt's diabetes is currently controlled by pills  Pt checks BS 2x a day  Pt's last blood sugar was 112 this morning  Last HA1C was 7.5 on 12/09/23  Endocrinologist: none  Last edited by Dora Altman OT on 1/16/2024  4:31 PM.        Patient History:  Past Medical History:   Diagnosis Date    Ametropia     Hyperopia    Diabetes (HCC)     Lipid screening 5/29/2014    Numbness and tingling of leg     Other and unspecified hyperlipidemia     Stroke (HCC) 1/2014    Type II or unspecified type diabetes mellitus without mention of complication, not stated as uncontrolled     Unspecified essential hypertension        Surgical History: Radha August has a past surgical history that includes hysterectomy; kenn localization wire 1 site right (cpt=19281) (pt states 20 yrs+ she had a benign cyst removed from her rt breast. ); cyst aspiration right (Right); colonoscopy (5 years ago date unknown); and oophorectomy.    Family History   Problem Relation Age of Onset    Heart Disorder Mother         congestive heart failure    Diabetes Other         Daughter has DM    Breast Cancer Other         maternal cousin    Macular degeneration Neg     Glaucoma Neg        Social History:   Social History     Socioeconomic History    Marital status: Single   Tobacco Use    Smoking status: Never    Smokeless tobacco: Never   Vaping Use    Vaping Use: Never used   Substance and Sexual Activity    Alcohol use: No    Drug use: No   Other Topics Concern    Caffeine  Concern No       Medications:  Current Outpatient Medications   Medication Sig Dispense Refill    atorvastatin 20 MG Oral Tab Take 1 tablet (20 mg total) by mouth nightly. 30 tablet 0    metoprolol succinate ER 25 MG Oral Tablet 24 Hr Take 1 tablet (25 mg total) by mouth daily. 30 tablet 0    metFORMIN 1000 MG Oral Tab Take 1 tablet (1,000 mg total) by mouth 2 (two) times daily with meals. 180 tablet 1    Alcohol Swabs (DROPSAFE ALCOHOL PREP) 70 % Does not apply Pads Apply 1 Application topically As Directed. 100 each 3    clopidogrel 75 MG Oral Tab Take 1 tablet (75 mg total) by mouth daily. 90 tablet 3    lisinopril-hydroCHLOROthiazide 20-25 MG Oral Tab Take 1 tablet by mouth daily. 90 tablet 3    gabapentin 100 MG Oral Cap Take 1 capsule (100 mg total) by mouth nightly. 30 capsule 2    Glucose Blood (TRUE METRIX BLOOD GLUCOSE TEST) In Vitro Strip TEST BLOOD SUGAR EVERY  strip 3    Acetaminophen-Codeine (TYLENOL WITH CODEINE #3) 300-30 MG Oral Tab Take 1 tablet by mouth every 12 (twelve) hours as needed for Pain. 30 tablet 0    LIDOCAINE 5 % External Patch APPLY 1 PATCH TOPICALLY TO THE SKIN EVERY 24 HOURS AS NEEDED 90 patch 0    naproxen 500 MG Oral Tab Take 1 tablet (500 mg total) by mouth 2 (two) times daily as needed. 15 tablet 0    amLODIPine 2.5 MG Oral Tab Take 1 tablet (2.5 mg total) by mouth daily. 30 tablet 2    Diclofenac Sodium 1 % External Gel Apply 2 g topically 4 (four) times daily. 1 each 0    SHINGRIX 50 MCG/0.5ML Intramuscular Recon Susp       Pyridoxine HCl (VITAMIN B-6 OR) Take by mouth.      CALCIUM OR Take by mouth.      Cholecalciferol (VITAMIN D-3 OR) Take by mouth.      aspirin 81 MG Oral Tab EC TAKE 1 TABLET BY MOUTH EVERY DAY 30 tablet 2    melatonin 3 MG Oral Tab Take one tab at night 30 min before going to bed 30 tablet 0    methylPREDNISolone 4 MG Oral Tablet Therapy Pack Take as directed on package. (Patient not taking: Reported on 7/3/2023) 1 each 0    Blood Glucose  Monitoring Suppl (TRUE METRIX AIR GLUCOSE METER) w/Device Does not apply Kit USE AS DIRECTED 1 kit 0    methylPREDNISolone 4 MG Oral Tablet Therapy Pack Take as directed on package. 1 each 0       Allergies:  No Known Allergies    ROS:       PHYSICAL EXAM:     Base Eye Exam       Visual Acuity (Snellen - Linear)         Right Left    Dist cc 20/40 -2 20/50 +1    Dist ph cc  20/40 -1    Near cc 20/70 20/40      Correction: Glasses              Tonometry (Icare, 4:44 PM)         Right Left    Pressure 15 17              Pupils         Pupils    Right PERRL    Left PERRL              Visual Fields         Left Right     Full Full              Extraocular Movement         Right Left     Full, Ortho Full, Ortho              Neuro/Psych       Oriented x3: Yes              Dilation       Both eyes: 1.0% Mydriacyl and 2.5% Prieto Synephrine @ 4:45 PM              Dilation #2       Both eyes: 1.0% Mydriacyl and 2.5% Prieto Synephrine @ 4:45 PM                  Slit Lamp and Fundus Exam       Slit Lamp Exam         Right Left    Lids/Lashes Dermatochalasis, Meibomian gland dysfunction Dermatochalasis, Meibomian gland dysfunction    Conjunctiva/Sclera Ocular Melanosis Ocular Melanosis    Cornea Clear, 1+ arcus Clear, 1+ arcus    Anterior Chamber Deep and quiet Deep and quiet    Iris Normal Normal    Lens 3+ Nuclear sclerosis, 1+ Cortical cataract 3+ Nuclear sclerosis, 1+ Cortical cataract    Vitreous Vitreous floaters Vitreous floaters              Fundus Exam         Right Left    Disc Good rim Good rim, Temporal crescent    C/D Ratio 0.5 0.5    Macula Normal- no BDR Normal- no BDR    Vessels Normal Normal    Periphery Normal Normal                  Refraction       Wearing Rx         Sphere Cylinder Axis Add    Right +2.25 +1.75 180 +2.75    Left +2.75 +1.25 180 +2.75      Type: Progressive bifocal              Manifest Refraction         Sphere Cylinder Eugene Dist VA Add Near VA    Right +1.75 +1.75 180 20/40 +3.00 20/30    Left  +2.00 +1.25 180 20/30- +3.00 20/30              Final Rx         Sphere Cylinder Boody Dist VA Add Near VA    Right +1.75 +1.75 180 20/40 +3.00 20/30    Left +2.00 +1.25 180 20/30- +3.00 20/30      Type: Progressive bifocal                     ASSESSMENT/PLAN:     Diagnoses and Plan:     Diabetes mellitus type 2 without retinopathy (HCC)  Diabetes type II: no background of retinopathy, no signs of neovascularization noted.  Discussed ocular and systemic benefits of blood sugar control.  Diagnosis and treatment discussed in detail with patient.    Will see patient in 1 year for a diabetic exam    Age-related nuclear cataract of both eyes   Discussed diagnosis of cataracts in detail with patient.  Cataract surgery could be considered at this time due to decreased vision, but it would be patient's choice.   Patient is not interested in surgery at this time.    Will continue to monitor yearly.  Patient will call sooner than 1 year recall if they notice a change in vision.  New glasses RX written.       Floater, vitreous, bilateral   There is no evidence of retinal pathology.  All signs and symptoms of retinal detachment/tears explained in detail.    Patient instructed to call the office if they experience increase in floaters, increase in flashes of light, loss of vision or curtain or veil effect.       No orders of the defined types were placed in this encounter.      Meds This Visit:  Requested Prescriptions      No prescriptions requested or ordered in this encounter        Follow up instructions:  Return in about 1 year (around 1/16/2025) for Diabetic eye exam.    1/16/2024  Scribed by: Bret Solis MD        If you have questions, please do not hesitate to call me. I look forward to following Radha along with you.    Sincerely,        Bret Solis MD        CC:   No Recipients    Document electronically generated by: Bret Solis MD

## (undated) NOTE — LETTER
AUTHORIZATION FOR SURGICAL OPERATION OR OTHER PROCEDURE    1. I hereby authorize Dr. Giovany Odonnell and the [facility] staff assigned to my case to perform the following operation and/or procedure at the [facility]:     Right Knee injection w/ Orthovisc x3     2. My physician has explained the nature and purpose of the operation or other procedure, possible alternative methods of treatment, the risks involved, and the possibility of complication to me. I acknowledge that no guarantee has been made as to the result that may be obtained. 3.  I recognize that, during the course of this operation, or other procedure, unforseen conditions may necessitate additional or different procedure than those listed above. I, therefore, further authorize and request that the above named physician, his/her physician assistants or designees perform such procedures as are, in his/her professional opinion, necessary and desirable. 4.  Any tissue or organs removed in the operation or other procedure may be disposed of by and at the discretion of the [facility] staff and [facility]. 5.  I understand that in the event of a medical emergency, I will be transported by local paramedics to [facility] or other hospital emergency department. 6.  I certify that I have read and fully understand the above consent to operation and/or other procedure. 7.  I acknowledge that my physician has explained sedation/analgesia administration to me including the risks and benefits. I consent to the administration of sedation/analgesia as may be necessary or desirable in the judgement of my physician. Witness signature: ___________________________________________________ Date:  ______/______/_____                    Time:  ________ A. M.  P.M.        [facility]  6/5/1950  [id number]         Patient signature:  ___________________________________________________      Statement of Physician  My signature below affirms that prior to the time of the procedure, I have explained to the patient and/or his/her guardian, the risks and benefits involved in the proposed treatment and any reasonable alternative to the proposed treatment. I have also explained the risks and benefits involved in the refusal of the proposed treatment and have answered the patient's questions.                         Date:  ______/______/_______  Provider                      Signature:  __________________________________________________________       Time:  ___________ A.M    P.M.

## (undated) NOTE — LETTER
AUTHORIZATION FOR SURGICAL OPERATION OR OTHER PROCEDURE    1. I hereby authorize Dr. Kevin Ortez and the [facility] staff assigned to my case to perform the following operation and/or procedure at the [facility]:    Carpal tunnel injection     2.  My physician has explained the nature and purpose of the operation or other procedure, possible alternative methods of treatment, the risks involved, and the possibility of complication to me.  I acknowledge that no guarantee has been made as to the result that may be obtained.  3.  I recognize that, during the course of this operation, or other procedure, unforseen conditions may necessitate additional or different procedure than those listed above.  I, therefore, further authorize and request that the above named physician, his/her physician assistants or designees perform such procedures as are, in his/her professional opinion, necessary and desirable.  4.  Any tissue or organs removed in the operation or other procedure may be disposed of by and at the discretion of the [facility] staff and [facility].  5.  I understand that in the event of a medical emergency, I will be transported by local paramedics to [facility] or other hospital emergency department.  6.  I certify that I have read and fully understand the above consent to operation and/or other procedure.    7.  I acknowledge that my physician has explained sedation/analgesia administration to me including the risks and benefits.  I consent to the administration of sedation/analgesia as may be necessary or desirable in the judgement of my physician.    Witness signature: ___________________________________________________ Date:  ______/______/_____                    Time:  ________ A.M.  P.M.       Patient Name: Radha Sanchez August  6/5/1950  [id number]     Patient signature:  ___________________________________________________               Statement of Physician  My signature  below affirms that prior to the time of the procedure, I have explained to the patient and/or his/her guardian, the risks and benefits involved in the proposed treatment and any reasonable alternative to the proposed treatment.  I have also explained the risks and benefits involved in the refusal of the proposed treatment and have answered the patient's questions.                        Date:  ______/______/_______  Provider                      Signature:  __________________________________________________________       Time:  ___________ A.M    P.M.

## (undated) NOTE — LETTER
December 19, 2017    [place] [facility] [id number]     Patient: Dominic Frost   YOB: 1950   Date of Visit: 12/19/2017       Dear Dr. Colby Wakefield MD:    Thank you for referring Patricio Keysha to me for evaluation.  H Rfl: 0   Blood Glucose Monitoring Suppl (ACCU-CHEK JOSE MANUEL PLUS) w/Device Does not apply Kit USE UTD Disp:  Rfl: 3   Pyridoxine HCl (VITAMIN B-6 OR) Take by mouth. Disp:  Rfl:    CALCIUM OR Take by mouth.  Disp:  Rfl:    Cholecalciferol (VITAMIN D-3 OR) Take Slit Lamp Exam       Right Left    Lids/Lashes Dermatochalasis, Meibomian gland dysfunction Dermatochalasis, Meibomian gland dysfunction    Conjunctiva/Sclera Normal Normal    Cornea Clear Clear    Anterior Chamber Deep and quiet Deep and quiet    Iris No

## (undated) NOTE — LETTER
[facility], Ramone Oliver, [facility]   Date:   9/20/2023     Name:   Hebert Tan    YOB: 1950   MRN:   [id number]       WHERE IS YOUR PAIN NOW? Lelo the areas on your body where you feel the described sensations. Use the appropriate symbol. [facility] the areas of radiation. Include all affected areas. Just to complete the picture, please draw in the face. ACHE:  ^ ^ ^   NUMBNESS:  0000   PINS & NEEDLES:  = = = =                              ^ ^ ^                       0000              = = = =                                    ^ ^ ^                       0000            = = = =      BURNING:  XXXX   STABBING: ////                  XXXX                ////                         XXXX          ////     Please lelo the line below indicating your degree of pain right now  with 0 being no pain 10 being the worst pain possible.                                          0             1             2              3             4              5              6              7             8             9             10         Patient Signature: